# Patient Record
Sex: FEMALE | Race: WHITE | NOT HISPANIC OR LATINO
[De-identification: names, ages, dates, MRNs, and addresses within clinical notes are randomized per-mention and may not be internally consistent; named-entity substitution may affect disease eponyms.]

---

## 2017-01-17 ENCOUNTER — APPOINTMENT (OUTPATIENT)
Dept: RADIOLOGY | Facility: HOSPITAL | Age: 72
End: 2017-01-17

## 2018-05-29 ENCOUNTER — INPATIENT (INPATIENT)
Facility: HOSPITAL | Age: 73
LOS: 1 days | Discharge: HOME | DRG: 641 | End: 2018-05-31
Attending: INTERNAL MEDICINE | Admitting: INTERNAL MEDICINE
Payer: MEDICARE

## 2018-05-29 DIAGNOSIS — R55 SYNCOPE AND COLLAPSE: ICD-10-CM

## 2018-05-29 PROCEDURE — 71045 X-RAY EXAM CHEST 1 VIEW: CPT | Mod: 26

## 2018-05-29 PROCEDURE — 70450 CT HEAD/BRAIN W/O DYE: CPT | Mod: 26

## 2018-05-29 PROCEDURE — 99223 1ST HOSP IP/OBS HIGH 75: CPT

## 2018-05-29 PROCEDURE — 93010 ELECTROCARDIOGRAM REPORT: CPT

## 2018-05-30 PROCEDURE — 71275 CT ANGIOGRAPHY CHEST: CPT | Mod: 26

## 2018-05-30 PROCEDURE — 93970 EXTREMITY STUDY: CPT | Mod: 26

## 2018-05-30 PROCEDURE — 93306 TTE W/DOPPLER COMPLETE: CPT | Mod: 26

## 2018-05-30 PROCEDURE — 99233 SBSQ HOSP IP/OBS HIGH 50: CPT

## 2018-05-31 PROCEDURE — 76705 ECHO EXAM OF ABDOMEN: CPT | Mod: 26,RT

## 2018-05-31 PROCEDURE — 80048 BASIC METABOLIC PNL TOTAL CA: CPT

## 2018-05-31 PROCEDURE — 93306 TTE W/DOPPLER COMPLETE: CPT

## 2018-05-31 PROCEDURE — 84484 ASSAY OF TROPONIN QUANT: CPT

## 2018-05-31 PROCEDURE — 82248 BILIRUBIN DIRECT: CPT

## 2018-05-31 PROCEDURE — 83735 ASSAY OF MAGNESIUM: CPT

## 2018-05-31 PROCEDURE — 83036 HEMOGLOBIN GLYCOSYLATED A1C: CPT

## 2018-05-31 PROCEDURE — 82553 CREATINE MB FRACTION: CPT

## 2018-05-31 PROCEDURE — 85610 PROTHROMBIN TIME: CPT

## 2018-05-31 PROCEDURE — 99232 SBSQ HOSP IP/OBS MODERATE 35: CPT

## 2018-05-31 PROCEDURE — 99285 EMERGENCY DEPT VISIT HI MDM: CPT | Mod: 25

## 2018-05-31 PROCEDURE — 85027 COMPLETE CBC AUTOMATED: CPT

## 2018-05-31 PROCEDURE — 85730 THROMBOPLASTIN TIME PARTIAL: CPT

## 2018-05-31 PROCEDURE — 71275 CT ANGIOGRAPHY CHEST: CPT

## 2018-05-31 PROCEDURE — 70450 CT HEAD/BRAIN W/O DYE: CPT

## 2018-05-31 PROCEDURE — 83880 ASSAY OF NATRIURETIC PEPTIDE: CPT

## 2018-05-31 PROCEDURE — 97162 PT EVAL MOD COMPLEX 30 MIN: CPT

## 2018-05-31 PROCEDURE — 80053 COMPREHEN METABOLIC PANEL: CPT

## 2018-05-31 PROCEDURE — 83921 ORGANIC ACID SINGLE QUANT: CPT

## 2018-05-31 PROCEDURE — 99239 HOSP IP/OBS DSCHRG MGMT >30: CPT

## 2018-05-31 PROCEDURE — 82550 ASSAY OF CK (CPK): CPT

## 2018-05-31 PROCEDURE — 80076 HEPATIC FUNCTION PANEL: CPT

## 2018-05-31 PROCEDURE — 93970 EXTREMITY STUDY: CPT

## 2018-05-31 PROCEDURE — 36415 COLL VENOUS BLD VENIPUNCTURE: CPT

## 2018-05-31 PROCEDURE — 71045 X-RAY EXAM CHEST 1 VIEW: CPT

## 2018-05-31 PROCEDURE — 82607 VITAMIN B-12: CPT

## 2018-05-31 PROCEDURE — 94640 AIRWAY INHALATION TREATMENT: CPT

## 2018-05-31 PROCEDURE — 93005 ELECTROCARDIOGRAM TRACING: CPT

## 2018-05-31 PROCEDURE — 76705 ECHO EXAM OF ABDOMEN: CPT

## 2018-07-25 ENCOUNTER — EMERGENCY (EMERGENCY)
Facility: HOSPITAL | Age: 73
LOS: 1 days | Discharge: HOME | End: 2018-07-25
Attending: EMERGENCY MEDICINE | Admitting: EMERGENCY MEDICINE
Payer: MEDICARE

## 2018-07-25 VITALS
DIASTOLIC BLOOD PRESSURE: 72 MMHG | RESPIRATION RATE: 18 BRPM | HEART RATE: 90 BPM | HEIGHT: 64 IN | WEIGHT: 149.91 LBS | OXYGEN SATURATION: 95 % | SYSTOLIC BLOOD PRESSURE: 127 MMHG | TEMPERATURE: 98 F

## 2018-07-25 DIAGNOSIS — R07.81 PLEURODYNIA: ICD-10-CM

## 2018-07-25 PROCEDURE — 99284 EMERGENCY DEPT VISIT MOD MDM: CPT

## 2018-07-26 VITALS
DIASTOLIC BLOOD PRESSURE: 93 MMHG | RESPIRATION RATE: 16 BRPM | HEART RATE: 95 BPM | TEMPERATURE: 99 F | OXYGEN SATURATION: 95 % | SYSTOLIC BLOOD PRESSURE: 181 MMHG

## 2018-07-26 PROCEDURE — 71250 CT THORAX DX C-: CPT | Mod: 26

## 2018-07-26 PROCEDURE — 99284 EMERGENCY DEPT VISIT MOD MDM: CPT

## 2018-07-26 PROCEDURE — 70450 CT HEAD/BRAIN W/O DYE: CPT

## 2018-07-26 PROCEDURE — 71250 CT THORAX DX C-: CPT

## 2018-07-26 PROCEDURE — 70450 CT HEAD/BRAIN W/O DYE: CPT | Mod: 26

## 2018-07-26 RX ORDER — IBUPROFEN 200 MG
1 TABLET ORAL
Qty: 20 | Refills: 0 | OUTPATIENT
Start: 2018-07-26 | End: 2018-07-30

## 2018-07-26 RX ORDER — IBUPROFEN 200 MG
1 TABLET ORAL
Qty: 8 | Refills: 0 | OUTPATIENT
Start: 2018-07-26 | End: 2018-07-27

## 2018-07-26 NOTE — ED PROVIDER NOTE - OBJECTIVE STATEMENT
Pertinent PMH/PSH/FHx/SHx and Review of Systems contained within:  74 y/o F with H/o asthma, DM and HTN , and alcohol abuse BIB ems c/o left upper rib pain s/p fall after drinking. C/o mod dull constant pain fro past few hours.

## 2018-07-26 NOTE — ED ADULT NURSE REASSESSMENT NOTE - NS ED NURSE REASSESS COMMENT FT1
Pt is sleeping comfortably. No resp. distress noted at this time. Pending sobriety
 Mr Ramírez Rodríguez called [phone], Mr Rodríguez will have the door open for the patient and the patient will be transferred home via Taxi.
Breakfast tray provided. Paper scrubs provided. VSS, RR=unlab. Patient awaiting Taxi transportation home.
Pt is A&Ox3 and is ambulating with a steady gait. No resp. distress noted at this time. Pt states that she is feeling better. Pt is waiting for  to wake up so he will be able to open the door for her. Several attempts made to contact  at , no answer. Message was left.

## 2018-07-27 ENCOUNTER — APPOINTMENT (OUTPATIENT)
Dept: CT IMAGING | Facility: HOSPITAL | Age: 73
End: 2018-07-27
Payer: MEDICARE

## 2018-07-27 ENCOUNTER — OUTPATIENT (OUTPATIENT)
Dept: OUTPATIENT SERVICES | Facility: HOSPITAL | Age: 73
LOS: 1 days | End: 2018-07-27
Payer: MEDICARE

## 2018-07-27 DIAGNOSIS — Z00.8 ENCOUNTER FOR OTHER GENERAL EXAMINATION: ICD-10-CM

## 2018-07-27 PROBLEM — I10 ESSENTIAL (PRIMARY) HYPERTENSION: Chronic | Status: ACTIVE | Noted: 2018-07-25

## 2018-07-27 PROBLEM — E11.9 TYPE 2 DIABETES MELLITUS WITHOUT COMPLICATIONS: Chronic | Status: ACTIVE | Noted: 2018-07-25

## 2018-07-27 PROBLEM — J45.909 UNSPECIFIED ASTHMA, UNCOMPLICATED: Chronic | Status: ACTIVE | Noted: 2018-07-25

## 2018-07-27 PROCEDURE — 71250 CT THORAX DX C-: CPT | Mod: 26

## 2018-07-27 PROCEDURE — 71250 CT THORAX DX C-: CPT

## 2018-09-29 ENCOUNTER — INPATIENT (INPATIENT)
Facility: HOSPITAL | Age: 73
LOS: 8 days | Discharge: NURSING, REHAB OR LONG-TERM CARE | DRG: 483 | End: 2018-10-08
Attending: STUDENT IN AN ORGANIZED HEALTH CARE EDUCATION/TRAINING PROGRAM | Admitting: HOSPITALIST
Payer: MEDICARE

## 2018-09-29 VITALS
DIASTOLIC BLOOD PRESSURE: 89 MMHG | WEIGHT: 149.91 LBS | HEIGHT: 64 IN | SYSTOLIC BLOOD PRESSURE: 170 MMHG | HEART RATE: 95 BPM | OXYGEN SATURATION: 95 % | RESPIRATION RATE: 18 BRPM

## 2018-09-29 DIAGNOSIS — E11.9 TYPE 2 DIABETES MELLITUS WITHOUT COMPLICATIONS: ICD-10-CM

## 2018-09-29 DIAGNOSIS — S42.301A UNSPECIFIED FRACTURE OF SHAFT OF HUMERUS, RIGHT ARM, INITIAL ENCOUNTER FOR CLOSED FRACTURE: ICD-10-CM

## 2018-09-29 DIAGNOSIS — Z95.1 PRESENCE OF AORTOCORONARY BYPASS GRAFT: Chronic | ICD-10-CM

## 2018-09-29 DIAGNOSIS — I10 ESSENTIAL (PRIMARY) HYPERTENSION: ICD-10-CM

## 2018-09-29 DIAGNOSIS — R55 SYNCOPE AND COLLAPSE: ICD-10-CM

## 2018-09-29 DIAGNOSIS — F10.10 ALCOHOL ABUSE, UNCOMPLICATED: ICD-10-CM

## 2018-09-29 LAB
ALBUMIN SERPL ELPH-MCNC: 3.2 G/DL — LOW (ref 3.3–5)
ALP SERPL-CCNC: 67 U/L — SIGNIFICANT CHANGE UP (ref 40–120)
ALT FLD-CCNC: 24 U/L DA — SIGNIFICANT CHANGE UP (ref 10–45)
ANION GAP SERPL CALC-SCNC: 13 MMOL/L — SIGNIFICANT CHANGE UP (ref 5–17)
APTT BLD: 33.5 SEC — SIGNIFICANT CHANGE UP (ref 27.5–37.4)
AST SERPL-CCNC: 21 U/L — SIGNIFICANT CHANGE UP (ref 10–40)
BASOPHILS # BLD AUTO: 0.1 K/UL — SIGNIFICANT CHANGE UP (ref 0–0.2)
BASOPHILS NFR BLD AUTO: 0.9 % — SIGNIFICANT CHANGE UP (ref 0–2)
BILIRUB SERPL-MCNC: 0.2 MG/DL — SIGNIFICANT CHANGE UP (ref 0.2–1.2)
BUN SERPL-MCNC: 14 MG/DL — SIGNIFICANT CHANGE UP (ref 7–23)
CALCIUM SERPL-MCNC: 8.7 MG/DL — SIGNIFICANT CHANGE UP (ref 8.4–10.5)
CHLORIDE SERPL-SCNC: 101 MMOL/L — SIGNIFICANT CHANGE UP (ref 96–108)
CK SERPL-CCNC: 125 U/L — SIGNIFICANT CHANGE UP (ref 25–170)
CO2 SERPL-SCNC: 24 MMOL/L — SIGNIFICANT CHANGE UP (ref 22–31)
CREAT SERPL-MCNC: 0.78 MG/DL — SIGNIFICANT CHANGE UP (ref 0.5–1.3)
EOSINOPHIL # BLD AUTO: 0.2 K/UL — SIGNIFICANT CHANGE UP (ref 0–0.5)
EOSINOPHIL NFR BLD AUTO: 1.6 % — SIGNIFICANT CHANGE UP (ref 0–6)
ETHANOL SERPL-MCNC: 186 MG/DL — HIGH (ref 0–3)
GLUCOSE SERPL-MCNC: 131 MG/DL — HIGH (ref 70–99)
HCT VFR BLD CALC: 42.5 % — SIGNIFICANT CHANGE UP (ref 34.5–45)
HGB BLD-MCNC: 14.3 G/DL — SIGNIFICANT CHANGE UP (ref 11.5–15.5)
INR BLD: 0.98 RATIO — SIGNIFICANT CHANGE UP (ref 0.88–1.16)
LYMPHOCYTES # BLD AUTO: 1.5 K/UL — SIGNIFICANT CHANGE UP (ref 1–3.3)
LYMPHOCYTES # BLD AUTO: 15 % — SIGNIFICANT CHANGE UP (ref 13–44)
MCHC RBC-ENTMCNC: 33.6 GM/DL — SIGNIFICANT CHANGE UP (ref 32–36)
MCHC RBC-ENTMCNC: 34.4 PG — HIGH (ref 27–34)
MCV RBC AUTO: 102.5 FL — HIGH (ref 80–100)
MONOCYTES # BLD AUTO: 0.5 K/UL — SIGNIFICANT CHANGE UP (ref 0–0.9)
MONOCYTES NFR BLD AUTO: 5.4 % — SIGNIFICANT CHANGE UP (ref 2–14)
NEUTROPHILS # BLD AUTO: 7.9 K/UL — HIGH (ref 1.8–7.4)
NEUTROPHILS NFR BLD AUTO: 77.2 % — HIGH (ref 43–77)
PLATELET # BLD AUTO: 175 K/UL — SIGNIFICANT CHANGE UP (ref 150–400)
POTASSIUM SERPL-MCNC: 4.4 MMOL/L — SIGNIFICANT CHANGE UP (ref 3.5–5.3)
POTASSIUM SERPL-SCNC: 4.4 MMOL/L — SIGNIFICANT CHANGE UP (ref 3.5–5.3)
PROT SERPL-MCNC: 7 G/DL — SIGNIFICANT CHANGE UP (ref 6–8.3)
PROTHROM AB SERPL-ACNC: 10.9 SEC — SIGNIFICANT CHANGE UP (ref 9.8–12.7)
RBC # BLD: 4.14 M/UL — SIGNIFICANT CHANGE UP (ref 3.8–5.2)
RBC # FLD: 12.6 % — SIGNIFICANT CHANGE UP (ref 10.3–14.5)
SODIUM SERPL-SCNC: 138 MMOL/L — SIGNIFICANT CHANGE UP (ref 135–145)
TROPONIN I SERPL-MCNC: <.017 NG/ML — LOW (ref 0.02–0.06)
WBC # BLD: 10.2 K/UL — SIGNIFICANT CHANGE UP (ref 3.8–10.5)
WBC # FLD AUTO: 10.2 K/UL — SIGNIFICANT CHANGE UP (ref 3.8–10.5)

## 2018-09-29 PROCEDURE — 71045 X-RAY EXAM CHEST 1 VIEW: CPT | Mod: 26

## 2018-09-29 PROCEDURE — 99223 1ST HOSP IP/OBS HIGH 75: CPT | Mod: 57

## 2018-09-29 PROCEDURE — 70450 CT HEAD/BRAIN W/O DYE: CPT | Mod: 26

## 2018-09-29 PROCEDURE — 93010 ELECTROCARDIOGRAM REPORT: CPT

## 2018-09-29 PROCEDURE — 73030 X-RAY EXAM OF SHOULDER: CPT | Mod: 26,RT

## 2018-09-29 PROCEDURE — 36620 INSERTION CATHETER ARTERY: CPT

## 2018-09-29 PROCEDURE — 76376 3D RENDER W/INTRP POSTPROCES: CPT | Mod: 26

## 2018-09-29 PROCEDURE — 12041 INTMD RPR N-HF/GENIT 2.5CM/<: CPT | Mod: 59

## 2018-09-29 PROCEDURE — 73060 X-RAY EXAM OF HUMERUS: CPT | Mod: 26,RT

## 2018-09-29 PROCEDURE — 99223 1ST HOSP IP/OBS HIGH 75: CPT | Mod: AI

## 2018-09-29 PROCEDURE — 73120 X-RAY EXAM OF HAND: CPT | Mod: 26,LT

## 2018-09-29 PROCEDURE — 99285 EMERGENCY DEPT VISIT HI MDM: CPT | Mod: 25

## 2018-09-29 PROCEDURE — 73200 CT UPPER EXTREMITY W/O DYE: CPT | Mod: 26,RT

## 2018-09-29 RX ORDER — SODIUM CHLORIDE 9 MG/ML
1000 INJECTION, SOLUTION INTRAVENOUS
Qty: 0 | Refills: 0 | Status: DISCONTINUED | OUTPATIENT
Start: 2018-09-29 | End: 2018-10-01

## 2018-09-29 RX ORDER — TETANUS TOXOID, REDUCED DIPHTHERIA TOXOID AND ACELLULAR PERTUSSIS VACCINE, ADSORBED 5; 2.5; 8; 8; 2.5 [IU]/.5ML; [IU]/.5ML; UG/.5ML; UG/.5ML; UG/.5ML
0.5 SUSPENSION INTRAMUSCULAR ONCE
Qty: 0 | Refills: 0 | Status: COMPLETED | OUTPATIENT
Start: 2018-09-29 | End: 2018-09-29

## 2018-09-29 RX ORDER — SODIUM CHLORIDE 9 MG/ML
1000 INJECTION INTRAMUSCULAR; INTRAVENOUS; SUBCUTANEOUS ONCE
Qty: 0 | Refills: 0 | Status: COMPLETED | OUTPATIENT
Start: 2018-09-29 | End: 2018-09-29

## 2018-09-29 RX ORDER — INSULIN LISPRO 100/ML
VIAL (ML) SUBCUTANEOUS
Qty: 0 | Refills: 0 | Status: DISCONTINUED | OUTPATIENT
Start: 2018-09-29 | End: 2018-10-01

## 2018-09-29 RX ORDER — OXYCODONE AND ACETAMINOPHEN 5; 325 MG/1; MG/1
1 TABLET ORAL EVERY 4 HOURS
Qty: 0 | Refills: 0 | Status: DISCONTINUED | OUTPATIENT
Start: 2018-09-29 | End: 2018-10-01

## 2018-09-29 RX ORDER — DEXTROSE 50 % IN WATER 50 %
25 SYRINGE (ML) INTRAVENOUS ONCE
Qty: 0 | Refills: 0 | Status: DISCONTINUED | OUTPATIENT
Start: 2018-09-29 | End: 2018-10-01

## 2018-09-29 RX ORDER — MORPHINE SULFATE 50 MG/1
4 CAPSULE, EXTENDED RELEASE ORAL ONCE
Qty: 0 | Refills: 0 | Status: DISCONTINUED | OUTPATIENT
Start: 2018-09-29 | End: 2018-09-29

## 2018-09-29 RX ORDER — HEPARIN SODIUM 5000 [USP'U]/ML
5000 INJECTION INTRAVENOUS; SUBCUTANEOUS EVERY 12 HOURS
Qty: 0 | Refills: 0 | Status: DISCONTINUED | OUTPATIENT
Start: 2018-09-29 | End: 2018-10-01

## 2018-09-29 RX ORDER — DEXTROSE 50 % IN WATER 50 %
12.5 SYRINGE (ML) INTRAVENOUS ONCE
Qty: 0 | Refills: 0 | Status: DISCONTINUED | OUTPATIENT
Start: 2018-09-29 | End: 2018-10-01

## 2018-09-29 RX ORDER — DIPHENHYDRAMINE HCL 50 MG
25 CAPSULE ORAL ONCE
Qty: 0 | Refills: 0 | Status: COMPLETED | OUTPATIENT
Start: 2018-09-29 | End: 2018-09-29

## 2018-09-29 RX ORDER — THIAMINE MONONITRATE (VIT B1) 100 MG
100 TABLET ORAL DAILY
Qty: 0 | Refills: 0 | Status: DISCONTINUED | OUTPATIENT
Start: 2018-09-29 | End: 2018-10-01

## 2018-09-29 RX ORDER — GLUCAGON INJECTION, SOLUTION 0.5 MG/.1ML
1 INJECTION, SOLUTION SUBCUTANEOUS ONCE
Qty: 0 | Refills: 0 | Status: DISCONTINUED | OUTPATIENT
Start: 2018-09-29 | End: 2018-10-01

## 2018-09-29 RX ORDER — FOLIC ACID 0.8 MG
1 TABLET ORAL DAILY
Qty: 0 | Refills: 0 | Status: DISCONTINUED | OUTPATIENT
Start: 2018-09-29 | End: 2018-10-01

## 2018-09-29 RX ORDER — DEXTROSE 50 % IN WATER 50 %
15 SYRINGE (ML) INTRAVENOUS ONCE
Qty: 0 | Refills: 0 | Status: DISCONTINUED | OUTPATIENT
Start: 2018-09-29 | End: 2018-10-01

## 2018-09-29 RX ADMIN — Medication 100 MILLIGRAM(S): at 23:01

## 2018-09-29 RX ADMIN — OXYCODONE AND ACETAMINOPHEN 1 TABLET(S): 5; 325 TABLET ORAL at 23:20

## 2018-09-29 RX ADMIN — Medication 25 MILLIGRAM(S): at 16:29

## 2018-09-29 RX ADMIN — MORPHINE SULFATE 4 MILLIGRAM(S): 50 CAPSULE, EXTENDED RELEASE ORAL at 15:59

## 2018-09-29 RX ADMIN — SODIUM CHLORIDE 1000 MILLILITER(S): 9 INJECTION INTRAMUSCULAR; INTRAVENOUS; SUBCUTANEOUS at 16:26

## 2018-09-29 RX ADMIN — OXYCODONE AND ACETAMINOPHEN 1 TABLET(S): 5; 325 TABLET ORAL at 22:23

## 2018-09-29 RX ADMIN — TETANUS TOXOID, REDUCED DIPHTHERIA TOXOID AND ACELLULAR PERTUSSIS VACCINE, ADSORBED 0.5 MILLILITER(S): 5; 2.5; 8; 8; 2.5 SUSPENSION INTRAMUSCULAR at 16:00

## 2018-09-29 NOTE — ED PROVIDER NOTE - CONSULTING PHYSICIAN
SW spoke with case management Myles regarding  who lives at home in poor hygienic conditions Ortho Dr. Dicpingatis
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
## 2018-09-29 NOTE — H&P ADULT - NSHPREVIEWOFSYSTEMS_GEN_ALL_CORE
REVIEW OF SYSTEMS:  CONSTITUTIONAL: No fever, weight loss, or fatigue  EYES: No eye pain, visual disturbances, or discharge  ENMT:  No difficulty hearing, tinnitus, vertigo; No sinus or throat pain  NECK: No pain or stiffness  BREASTS: No pain, masses, or nipple discharge  RESPIRATORY: No cough, wheezing, chills or hemoptysis; No shortness of breath  CARDIOVASCULAR: No chest pain, palpitations, dizziness, or leg swelling  GASTROINTESTINAL: No abdominal or epigastric pain. No nausea, vomiting, or hematemesis; No diarrhea or constipation. No melena or hematochezia.  GENITOURINARY: No dysuria, frequency, hematuria, or incontinence  NEUROLOGICAL: No headaches, memory loss, loss of strength, numbness, or tremors  SKIN: No itching, burning, rashes, or lesions   LYMPH NODES: No enlarged glands  ENDOCRINE: No heat or cold intolerance; No hair loss  MUSCULOSKELETAL: No joint pain or swelling; No muscle, back, or extremity pain  PSYCHIATRIC: No depression, anxiety, mood swings, or difficulty sleeping  HEME/LYMPH: No easy bruising, or bleeding gums  ALLERY AND IMMUNOLOGIC: No hives or eczema    ALL ROS REVIEWED AND NORMAL EXCEPT AS STATED ABOVE

## 2018-09-29 NOTE — ED PROVIDER NOTE - CARE PLAN
Principal Discharge DX:	Syncope, unspecified syncope type  Secondary Diagnosis:	Closed fracture of shaft of right humerus, unspecified fracture morphology, initial encounter Principal Discharge DX:	Syncope, unspecified syncope type  Secondary Diagnosis:	Closed fracture of shaft of right humerus, unspecified fracture morphology, initial encounter  Secondary Diagnosis:	Injury of head, initial encounter

## 2018-09-29 NOTE — ED ADULT NURSE NOTE - OBJECTIVE STATEMENT
73 year old female presents with left shoulder pain s/p fall in the bathroom. Pt stated that she was getting up from the toilet when she fell. Admitted to drinking wine today. Skin tear noted to right forearm. 73 year old female presents with left shoulder pain s/p fall in the bathroom. Pt stated that she was getting up from the toilet when she fell. Admitted to drinking wine today. Pt drinks everyday, no history of withdrawal. Skin tear noted to right forearm, laceration to the back of the head and abrasion to the right 4th finger. 73 year old female presents with left shoulder pain s/p fall in the bathroom. Pt stated that she was getting up from the toilet when she fell. Admitted to drinking wine today. Pt drinks everyday, no history of withdrawal. Skin tear noted to right forearm, laceration to the right 4th finger.

## 2018-09-29 NOTE — ED PROVIDER NOTE - MUSCULOSKELETAL MINIMAL EXAM
no C spine ttp, +right shoulder diffuse ttp, +right mid shaft humerus ttp, no right snuffbox ttp, no right wrist or elbow ttp. L wrist no ttp, no L elbow or shoulder ttp

## 2018-09-29 NOTE — ED PROCEDURE NOTE - NS ED PROCEDURE ASSISTED BY
The procedure was performed independently
The procedure was performed independently
Assistance was available

## 2018-09-29 NOTE — H&P ADULT - NSHPPHYSICALEXAM_GEN_ALL_CORE
T(C): 37.1 (09-29-18 @ 16:45), Max: 37.1 (09-29-18 @ 16:45)  HR: 95 (09-29-18 @ 14:57) (95 - 95)  BP: 170/89 (09-29-18 @ 14:57) (170/89 - 170/89)  RR: 18 (09-29-18 @ 14:57) (18 - 18)  SpO2: 95% (09-29-18 @ 14:57) (95% - 95%)  Wt(kg): --Vital Signs Last 24 Hrs  T(C): 37.1 (29 Sep 2018 16:45), Max: 37.1 (29 Sep 2018 16:45)  T(F): 98.7 (29 Sep 2018 16:45), Max: 98.7 (29 Sep 2018 16:45)  HR: 95 (29 Sep 2018 14:57) (95 - 95)  BP: 170/89 (29 Sep 2018 14:57) (170/89 - 170/89)  BP(mean): --  RR: 18 (29 Sep 2018 14:57) (18 - 18)  SpO2: 95% (29 Sep 2018 14:57) (95% - 95%)    PHYSICAL EXAM:  GENERAL: NAD, well-groomed, well-developed  HEAD:  Atraumatic, Normocephalic  EYES: EOMI, PERRLA, conjunctiva and sclera clear  ENMT: No tonsillar erythema, exudates, or enlargement; Moist mucous membranes, Good dentition, No lesions  NECK: Supple, No JVD, Normal thyroid  NERVOUS SYSTEM:  Alert & Oriented X3, Good concentration; Motor Strength 5/5 B/L upper and lower extremities; DTRs 2+ intact and symmetric  CHEST/LUNG: Clear to percussion bilaterally; No rales, rhonchi, wheezing, or rubs  HEART: Regular rate and rhythm; No murmurs, rubs, or gallops  ABDOMEN: Soft, Nontender, Nondistended; Bowel sounds present  EXTREMITIES:  2+ Peripheral Pulses, right arm in sling, tenderness to palpation, left ring finger laceration with sutures  LYMPH: No lymphadenopathy noted  SKIN: No rashes or lesions

## 2018-09-29 NOTE — ED PROVIDER NOTE - ATTENDING CONTRIBUTION TO CARE
Dr. Vigil: I performed a face to face bedside interview with patient regarding history of present illness, review of symptoms and past medical history. I completed an independent physical exam.  I have discussed patient's plan of care with PA.   I agree with note as stated above, having amended the EMR as needed to reflect my findings.   This includes HISTORY OF PRESENT ILLNESS, HIV, PAST MEDICAL/SURGICAL/FAMILY/SOCIAL HISTORY, ALLERGIES AND HOME MEDICATIONS, REVIEW OF SYSTEMS, PHYSICAL EXAM, and any PROGRESS NOTES during the time I functioned as the attending physician for this patient.    Dr. Vigil: This H&P has been written by myself in its entirety

## 2018-09-29 NOTE — ED PROVIDER NOTE - OBJECTIVE STATEMENT
72y/o f p/w left hand pain and right shoulder pain s/p fall at home in bathroom, unknown mechanism. Pt unable to recall details of fall, EMS reported top of toilet broken.  Hx of alcohol use, pt poor historian. Dr. Vigil: 73M h/o etoh abuse, drinks daily, no h/o WD but has never stopped drinking, h/o DM, HTN, asthma, had 2 glasses of wine today and fell in the bathroom. Unsure what made her fall, does not remember tripping. Hit the back of her head on the toilet and it cracked. +LOC. Unknown down time. +pain to right humerus and bleeding from left 4th finger. BIBEMS.  Per EMS pt and  live together, house was in disarray, they have a tub at home in which they have been defecating.

## 2018-09-29 NOTE — ED PROVIDER NOTE - SECONDARY DIAGNOSIS.
Closed fracture of shaft of right humerus, unspecified fracture morphology, initial encounter Injury of head, initial encounter

## 2018-09-29 NOTE — H&P ADULT - NSHPLABSRESULTS_GEN_ALL_CORE
LABS:                        14.3   10.2  )-----------( 175      ( 29 Sep 2018 16:32 )             42.5     09-29    138  |  101  |  14  ----------------------------<  131<H>  4.4   |  24  |  0.78    Ca    8.7      29 Sep 2018 16:32    TPro  7.0  /  Alb  3.2<L>  /  TBili  0.2  /  DBili  x   /  AST  21  /  ALT  24  /  AlkPhos  67  09-29    PT/INR - ( 29 Sep 2018 16:32 )   PT: 10.9 sec;   INR: 0.98 ratio       PTT - ( 29 Sep 2018 16:32 )  PTT:33.5 sec     CAPILLARY BLOOD GLUCOSE    RADIOLOGY & ADDITIONAL TESTS:    Consultant(s) Notes Reviewed:  [x ] YES  [ ] NO  Care Discussed with Consultants/Other Providers [ x] YES  [ ] NO  Imaging Personally Reviewed:  [ ] YES  [ ] NO

## 2018-09-29 NOTE — ED PROVIDER NOTE - MEDICAL DECISION MAKING DETAILS
pain ctrl, tdap, xray right shoulder/right humerus and left hand. CT head. EKG, labs for unwitnessed fall and syncope.

## 2018-09-29 NOTE — ED PROCEDURE NOTE - CPROC ED POST PROC CARE GUIDE1
Verbal/written post procedure instructions were given to patient/caregiver.
Instructed patient/caregiver to follow-up with primary care physician./Instructed patient/caregiver regarding signs and symptoms of infection./Verbal/written post procedure instructions were given to patient/caregiver.
Instructed patient/caregiver regarding signs and symptoms of infection./Verbal/written post procedure instructions were given to patient/caregiver./Instructed patient/caregiver to follow-up with primary care physician.

## 2018-09-29 NOTE — ED PROCEDURE NOTE - CPROC ED ARTER LINE DETAIL1
Positive blood return was obtained via the catheter./Using sterile technique, the correct location was identified, and a needle was inserted into the artery (specify in FT).

## 2018-09-29 NOTE — ED ADULT NURSE NOTE - NSIMPLEMENTINTERV_GEN_ALL_ED
Implemented All Fall Risk Interventions:  [place] to call system. Call bell, personal items and telephone within reach. Instruct patient to call for assistance. Room bathroom lighting operational. Non-slip footwear when patient is off stretcher. Physically safe environment: no spills, clutter or unnecessary equipment. Stretcher in lowest position, wheels locked, appropriate side rails in place. Provide visual cue, wrist band, yellow gown, etc. Monitor gait and stability. Monitor for mental status changes and reorient to person, place, and time. Review medications for side effects contributing to fall risk. Reinforce activity limits and safety measures with patient and family.

## 2018-09-29 NOTE — H&P ADULT - ASSESSMENT
73W PMH alcoholism?, HTN, HLD, CAd s/p CABG presents for fall off toilet, now with right humeral neck fracture.

## 2018-09-30 ENCOUNTER — TRANSCRIPTION ENCOUNTER (OUTPATIENT)
Age: 73
End: 2018-09-30

## 2018-09-30 DIAGNOSIS — Z29.9 ENCOUNTER FOR PROPHYLACTIC MEASURES, UNSPECIFIED: ICD-10-CM

## 2018-09-30 DIAGNOSIS — D53.9 NUTRITIONAL ANEMIA, UNSPECIFIED: ICD-10-CM

## 2018-09-30 LAB
ANION GAP SERPL CALC-SCNC: 7 MMOL/L — SIGNIFICANT CHANGE UP (ref 5–17)
BASOPHILS # BLD AUTO: 0.1 K/UL — SIGNIFICANT CHANGE UP (ref 0–0.2)
BASOPHILS NFR BLD AUTO: 1.1 % — SIGNIFICANT CHANGE UP (ref 0–2)
BUN SERPL-MCNC: 16 MG/DL — SIGNIFICANT CHANGE UP (ref 7–23)
CALCIUM SERPL-MCNC: 8.3 MG/DL — LOW (ref 8.4–10.5)
CHLORIDE SERPL-SCNC: 103 MMOL/L — SIGNIFICANT CHANGE UP (ref 96–108)
CO2 SERPL-SCNC: 26 MMOL/L — SIGNIFICANT CHANGE UP (ref 22–31)
CREAT SERPL-MCNC: 0.78 MG/DL — SIGNIFICANT CHANGE UP (ref 0.5–1.3)
EOSINOPHIL # BLD AUTO: 0.1 K/UL — SIGNIFICANT CHANGE UP (ref 0–0.5)
EOSINOPHIL NFR BLD AUTO: 1.1 % — SIGNIFICANT CHANGE UP (ref 0–6)
GLUCOSE SERPL-MCNC: 99 MG/DL — SIGNIFICANT CHANGE UP (ref 70–99)
HBA1C BLD-MCNC: 5.7 % — HIGH (ref 4–5.6)
HCT VFR BLD CALC: 36.2 % — SIGNIFICANT CHANGE UP (ref 34.5–45)
HGB BLD-MCNC: 11.9 G/DL — SIGNIFICANT CHANGE UP (ref 11.5–15.5)
LYMPHOCYTES # BLD AUTO: 2.1 K/UL — SIGNIFICANT CHANGE UP (ref 1–3.3)
LYMPHOCYTES # BLD AUTO: 20.9 % — SIGNIFICANT CHANGE UP (ref 13–44)
MAGNESIUM SERPL-MCNC: 1.2 MG/DL — LOW (ref 1.6–2.6)
MCHC RBC-ENTMCNC: 32.7 GM/DL — SIGNIFICANT CHANGE UP (ref 32–36)
MCHC RBC-ENTMCNC: 33.9 PG — SIGNIFICANT CHANGE UP (ref 27–34)
MCV RBC AUTO: 103.5 FL — HIGH (ref 80–100)
MONOCYTES # BLD AUTO: 1 K/UL — HIGH (ref 0–0.9)
MONOCYTES NFR BLD AUTO: 9.8 % — SIGNIFICANT CHANGE UP (ref 2–14)
NEUTROPHILS # BLD AUTO: 6.7 K/UL — SIGNIFICANT CHANGE UP (ref 1.8–7.4)
NEUTROPHILS NFR BLD AUTO: 67.1 % — SIGNIFICANT CHANGE UP (ref 43–77)
PLATELET # BLD AUTO: 153 K/UL — SIGNIFICANT CHANGE UP (ref 150–400)
POTASSIUM SERPL-MCNC: 5 MMOL/L — SIGNIFICANT CHANGE UP (ref 3.5–5.3)
POTASSIUM SERPL-SCNC: 5 MMOL/L — SIGNIFICANT CHANGE UP (ref 3.5–5.3)
RBC # BLD: 3.5 M/UL — LOW (ref 3.8–5.2)
RBC # FLD: 12.9 % — SIGNIFICANT CHANGE UP (ref 10.3–14.5)
SODIUM SERPL-SCNC: 136 MMOL/L — SIGNIFICANT CHANGE UP (ref 135–145)
VIT B12 SERPL-MCNC: 178 PG/ML — LOW (ref 232–1245)
WBC # BLD: 10 K/UL — SIGNIFICANT CHANGE UP (ref 3.8–10.5)
WBC # FLD AUTO: 10 K/UL — SIGNIFICANT CHANGE UP (ref 3.8–10.5)

## 2018-09-30 PROCEDURE — 99233 SBSQ HOSP IP/OBS HIGH 50: CPT

## 2018-09-30 PROCEDURE — 93010 ELECTROCARDIOGRAM REPORT: CPT

## 2018-09-30 PROCEDURE — 93306 TTE W/DOPPLER COMPLETE: CPT | Mod: 26

## 2018-09-30 RX ORDER — MAGNESIUM SULFATE 500 MG/ML
2 VIAL (ML) INJECTION ONCE
Qty: 0 | Refills: 0 | Status: DISCONTINUED | OUTPATIENT
Start: 2018-09-30 | End: 2018-09-30

## 2018-09-30 RX ORDER — LOSARTAN POTASSIUM 100 MG/1
50 TABLET, FILM COATED ORAL DAILY
Qty: 0 | Refills: 0 | Status: DISCONTINUED | OUTPATIENT
Start: 2018-09-30 | End: 2018-10-01

## 2018-09-30 RX ORDER — MAGNESIUM SULFATE 500 MG/ML
1 VIAL (ML) INJECTION
Qty: 0 | Refills: 0 | Status: COMPLETED | OUTPATIENT
Start: 2018-09-30 | End: 2018-09-30

## 2018-09-30 RX ORDER — METOPROLOL TARTRATE 50 MG
100 TABLET ORAL DAILY
Qty: 0 | Refills: 0 | Status: DISCONTINUED | OUTPATIENT
Start: 2018-09-30 | End: 2018-10-01

## 2018-09-30 RX ADMIN — Medication 100 GRAM(S): at 11:00

## 2018-09-30 RX ADMIN — OXYCODONE AND ACETAMINOPHEN 1 TABLET(S): 5; 325 TABLET ORAL at 09:59

## 2018-09-30 RX ADMIN — OXYCODONE AND ACETAMINOPHEN 1 TABLET(S): 5; 325 TABLET ORAL at 05:43

## 2018-09-30 RX ADMIN — OXYCODONE AND ACETAMINOPHEN 1 TABLET(S): 5; 325 TABLET ORAL at 14:15

## 2018-09-30 RX ADMIN — Medication 100 GRAM(S): at 09:49

## 2018-09-30 RX ADMIN — OXYCODONE AND ACETAMINOPHEN 1 TABLET(S): 5; 325 TABLET ORAL at 18:30

## 2018-09-30 RX ADMIN — Medication 50 MILLIGRAM(S): at 14:15

## 2018-09-30 RX ADMIN — Medication 100 MILLIGRAM(S): at 05:44

## 2018-09-30 RX ADMIN — OXYCODONE AND ACETAMINOPHEN 1 TABLET(S): 5; 325 TABLET ORAL at 21:39

## 2018-09-30 RX ADMIN — OXYCODONE AND ACETAMINOPHEN 1 TABLET(S): 5; 325 TABLET ORAL at 18:00

## 2018-09-30 RX ADMIN — OXYCODONE AND ACETAMINOPHEN 1 TABLET(S): 5; 325 TABLET ORAL at 14:45

## 2018-09-30 RX ADMIN — LOSARTAN POTASSIUM 50 MILLIGRAM(S): 100 TABLET, FILM COATED ORAL at 05:43

## 2018-09-30 RX ADMIN — OXYCODONE AND ACETAMINOPHEN 1 TABLET(S): 5; 325 TABLET ORAL at 23:01

## 2018-09-30 RX ADMIN — Medication 1 TABLET(S): at 11:45

## 2018-09-30 RX ADMIN — Medication 50 MILLIGRAM(S): at 05:43

## 2018-09-30 RX ADMIN — Medication 100 MILLIGRAM(S): at 11:46

## 2018-09-30 RX ADMIN — Medication 1 MILLIGRAM(S): at 11:02
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
## 2018-09-30 NOTE — PROGRESS NOTE ADULT - SUBJECTIVE AND OBJECTIVE BOX
Patient is a 73y old  Female who presents with a chief complaint of fall off toilet (30 Sep 2018 05:13)      Patient seen and examined at bedside.    ALLERGIES:  dust (Unknown)  latex (Unknown)  Originally Entered as [Unknown] reaction to [CATHAIR] (Unknown)  Originally Entered as [Unknown] reaction to [MILDEW] (Unknown)  Zosyn (Unknown)    MEDICATIONS:  chlordiazePOXIDE 50 milliGRAM(s) Oral three times a day  dextrose 40% Gel 15 Gram(s) Oral once PRN  dextrose 5%. 1000 milliLiter(s) IV Continuous <Continuous>  dextrose 50% Injectable 12.5 Gram(s) IV Push once  dextrose 50% Injectable 25 Gram(s) IV Push once  dextrose 50% Injectable 25 Gram(s) IV Push once  folic acid 1 milliGRAM(s) Oral daily  glucagon  Injectable 1 milliGRAM(s) IntraMuscular once PRN  heparin  Injectable 5000 Unit(s) SubCutaneous every 12 hours  insulin lispro (HumaLOG) corrective regimen sliding scale   SubCutaneous three times a day before meals  LORazepam   Injectable 1 milliGRAM(s) IntraMuscular every 1 hour PRN  losartan 50 milliGRAM(s) Oral daily  magnesium sulfate  IVPB 1 Gram(s) IV Intermittent every 1 hour  metoprolol succinate  milliGRAM(s) Oral daily  multivitamin 1 Tablet(s) Oral daily  oxyCODONE    5 mG/acetaminophen 325 mG 1 Tablet(s) Oral every 4 hours PRN  thiamine 100 milliGRAM(s) Oral daily    Vital Signs Last 24 Hrs  T(F): 98 (30 Sep 2018 09:21), Max: 98.8 (29 Sep 2018 22:12)  HR: 63 (30 Sep 2018 09:21) (63 - 95)  BP: 121/65 (30 Sep 2018 09:21) (121/65 - 170/89)  RR: 15 (30 Sep 2018 09:21) (14 - 18)  SpO2: 96% (30 Sep 2018 09:21) (95% - 99%)  I&O's Summary      PHYSICAL EXAM:  General: NAD, A/O x 3  ENT: MMM  Neck: Supple, No JVD  Lungs: Clear to auscultation bilaterally  Cardio: RRR, S1/S2, No murmurs  Abdomen: Soft, Nontender, Nondistended; Bowel sounds present  Extremities: No cyanosis, No edema right arm in sling     LABS:                        11.9   10.0  )-----------( 153      ( 30 Sep 2018 06:00 )             36.2     09-30    136  |  103  |  16  ----------------------------<  99  5.0   |  26  |  0.78    Ca    8.3      30 Sep 2018 06:00  Mg     1.2     09-30    TPro  7.0  /  Alb  3.2  /  TBili  0.2  /  DBili  x   /  AST  21  /  ALT  24  /  AlkPhos  67  09-29    eGFR if Non African American: 75 mL/min/1.73M2 (09-30-18 @ 06:00)  eGFR if : 87 mL/min/1.73M2 (09-30-18 @ 06:00)    PT/INR - ( 29 Sep 2018 16:32 )   PT: 10.9 sec;   INR: 0.98 ratio         PTT - ( 29 Sep 2018 16:32 )  PTT:33.5 sec    CARDIAC MARKERS ( 29 Sep 2018 16:32 )  <.017 ng/mL / x     / 125 U/L / x     / x                  CAPILLARY BLOOD GLUCOSE      POCT Blood Glucose.: 113 mg/dL (30 Sep 2018 07:33)  POCT Blood Glucose.: 184 mg/dL (29 Sep 2018 22:01)            RADIOLOGY & ADDITIONAL TESTS:    Care Discussed with Consultants/Other Providers:

## 2018-09-30 NOTE — PROGRESS NOTE ADULT - PROBLEM SELECTOR PLAN 1
patient will require surgical repair of humeral fracture she will also require cardio clearance for surgery. Raissa MOODY and Dr Coronel patient will require surgical repair of humeral fracture she will also require cardio clearance for surgery. Raissa MOODY and Dr Coronel aware  RCI patient is moderate risk 6.6% patient is medically optimized pending cardio clearance

## 2018-09-30 NOTE — PROGRESS NOTE ADULT - SUBJECTIVE AND OBJECTIVE BOX
Patient evaluated, chart reviewed, labs checked.  Pt with known alcoholism, CAD, DM, CABG, presents with a right are fracture after a fall at home.  Librium protocol started.   Would postpone this case until Tomorrow to monitor alcohol withdrawl, Greater regional anesthesia access and greater ICU coverage access.  Chago Dougherty

## 2018-09-30 NOTE — PROGRESS NOTE ADULT - ASSESSMENT
72 yo f pmh cad s/p cabg, essential htn (off meds), dyslipidemia, etoh abuse pw fall with right humeral fracture

## 2018-09-30 NOTE — PROGRESS NOTE ADULT - PROBLEM SELECTOR PLAN 2
alcohol level high  will start librium taper tomorrow  ativan PRN  patient requires surgical repair of humeral fracture she should be monitored in ICU s/p procedure may require precedex or intubation for airway protection s/p  surgery. Monitor in ICU sp surgery Dr Sagastume aware alcohol level high on admission current CIWA 2  on librium protocol   ativan PRN  patient requires surgical repair of humeral fracture she should be monitored in ICU s/p procedure may require precedex or intubation for airway protection s/p  surgery. Monitor in ICU sp surgery Dr Sagastume aware alcohol level high on admission current CIWA 2  on librium protocol   ativan PRN  patient requires surgical repair of humeral fracture she should be monitored in ICU s/p procedure may require precedex or intubation for airway protection s/p  surgery. Monitor in ICU sp surgery Dr Sagastume aware  cw thiamine folic acid and mtv
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
## 2018-09-30 NOTE — PROGRESS NOTE ADULT - PROBLEM SELECTOR PLAN 4
hold antihypertensive as pharmacist states she hasn't picked up meds in a long time  bp stable cw monitoring

## 2018-09-30 NOTE — PROGRESS NOTE ADULT - SUBJECTIVE AND OBJECTIVE BOX
Patient is a 73y old  Female who presents with a chief complaint of fall off toilet (29 Sep 2018 18:05)      BRIEF HOSPITAL COURSE:     73F admitted after "fall" attempting to get off toilet. Later discovered she was intoxicated with a high EtOH level. Admitted for fall and EtOH withdrawl    Events last 24 hours:   -called by patient's RN as patient is on anti-hypertensives at home and they were not ordered by admitting team. Her Bp elevated this am.    PAST MEDICAL & SURGICAL HISTORY:  Alcohol abuse  HTN (hypertension)  Asthma  DM (diabetes mellitus)  S/P CABG x 4      Review of Systems:  CONSTITUTIONAL: No fever, chills, or fatigue  EYES: No eye pain, visual disturbances, or discharge  ENMT:  No difficulty hearing, tinnitus, vertigo; No sinus or throat pain  NECK: No pain or stiffness  RESPIRATORY: No cough, wheezing, chills or hemoptysis; No shortness of breath  CARDIOVASCULAR: No chest pain, palpitations, dizziness, or leg swelling  GASTROINTESTINAL: No abdominal or epigastric pain. No nausea, vomiting, or hematemesis; No diarrhea or constipation. No melena or hematochezia.  GENITOURINARY: No dysuria, frequency, hematuria, or incontinence  NEUROLOGICAL: No headaches, memory loss, loss of strength, numbness, or tremors  SKIN: No itching, burning, rashes, or lesions   MUSCULOSKELETAL: No joint pain or swelling; No muscle, back, or extremity pain  PSYCHIATRIC: No depression, anxiety, mood swings, or difficulty sleeping      Medications:    losartan 50 milliGRAM(s) Oral daily      chlordiazePOXIDE 50 milliGRAM(s) Oral three times a day  LORazepam   Injectable 1 milliGRAM(s) IntraMuscular every 1 hour PRN  oxyCODONE    5 mG/acetaminophen 325 mG 1 Tablet(s) Oral every 4 hours PRN      heparin  Injectable 5000 Unit(s) SubCutaneous every 12 hours        dextrose 40% Gel 15 Gram(s) Oral once PRN  dextrose 50% Injectable 12.5 Gram(s) IV Push once  dextrose 50% Injectable 25 Gram(s) IV Push once  dextrose 50% Injectable 25 Gram(s) IV Push once  glucagon  Injectable 1 milliGRAM(s) IntraMuscular once PRN  insulin lispro (HumaLOG) corrective regimen sliding scale   SubCutaneous three times a day before meals    dextrose 5%. 1000 milliLiter(s) IV Continuous <Continuous>  folic acid 1 milliGRAM(s) Oral daily  multivitamin 1 Tablet(s) Oral daily  thiamine 100 milliGRAM(s) Oral daily                ICU Vital Signs Last 24 Hrs  T(C): 37.1 (30 Sep 2018 05:04), Max: 37.1 (29 Sep 2018 16:45)  T(F): 98.8 (30 Sep 2018 05:04), Max: 98.8 (29 Sep 2018 22:12)  HR: 78 (30 Sep 2018 05:04) (72 - 95)  BP: 169/70 (30 Sep 2018 05:04) (133/75 - 170/89)  BP(mean): --  ABP: --  ABP(mean): --  RR: 15 (30 Sep 2018 05:04) (14 - 18)  SpO2: 99% (30 Sep 2018 05:04) (95% - 99%)          I&O's Detail        LABS:                        14.3   10.2  )-----------( 175      ( 29 Sep 2018 16:32 )             42.5     09-29    138  |  101  |  14  ----------------------------<  131<H>  4.4   |  24  |  0.78    Ca    8.7      29 Sep 2018 16:32    TPro  7.0  /  Alb  3.2<L>  /  TBili  0.2  /  DBili  x   /  AST  21  /  ALT  24  /  AlkPhos  67  09-29      CARDIAC MARKERS ( 29 Sep 2018 16:32 )  <.017 ng/mL / x     / 125 U/L / x     / x          CAPILLARY BLOOD GLUCOSE      POCT Blood Glucose.: 184 mg/dL (29 Sep 2018 22:01)    PT/INR - ( 29 Sep 2018 16:32 )   PT: 10.9 sec;   INR: 0.98 ratio         PTT - ( 29 Sep 2018 16:32 )  PTT:33.5 sec    CULTURES:      Physical Examination:    General: No acute distress.  Alert, oriented, interactive, nonfocal    HEENT: Pupils equal, reactive to light.  Symmetric.    PULM: Clear to auscultation bilaterally, no significant sputum production    CVS: Regular rate and rhythm, no murmurs, rubs, or gallops    ABD: Soft, nondistended, nontender, normoactive bowel sounds, no masses    EXT: No edema, nontender    SKIN: Warm and well perfused, no rashes noted.

## 2018-09-30 NOTE — PROGRESS NOTE ADULT - ASSESSMENT
73D w/ HTN, admitted with EtoH abuse/withdrawl, hypertensive this AM, no meds ordered by primary team

## 2018-09-30 NOTE — CONSULT NOTE ADULT - ATTENDING COMMENTS
preop cardiovascular risk assessment  would weight risk vs benefit of surgery in the setting of alcohol intoxication. clearly acute alcohol intoxication carries cardiopulmonary risk in the perioperative period . will require monitoring for signs and symptoms of alcohol withdrawal and airway management. that not withstanding, there are no strict contraindication to surgery such as recent infarction overt heart failure or unstable angina, and therefore this patient may undergo planned urgent orthopedic surgery at an ASA class III risk for general anesthesia with an increased risk of perioperative mortality and morbidity.

## 2018-10-01 ENCOUNTER — RESULT REVIEW (OUTPATIENT)
Age: 73
End: 2018-10-01

## 2018-10-01 LAB
ANION GAP SERPL CALC-SCNC: 7 MMOL/L — SIGNIFICANT CHANGE UP (ref 5–17)
BLD GP AB SCN SERPL QL: SIGNIFICANT CHANGE UP
BUN SERPL-MCNC: 23 MG/DL — SIGNIFICANT CHANGE UP (ref 7–23)
CALCIUM SERPL-MCNC: 8.6 MG/DL — SIGNIFICANT CHANGE UP (ref 8.4–10.5)
CHLORIDE SERPL-SCNC: 100 MMOL/L — SIGNIFICANT CHANGE UP (ref 96–108)
CO2 SERPL-SCNC: 28 MMOL/L — SIGNIFICANT CHANGE UP (ref 22–31)
CREAT SERPL-MCNC: 0.97 MG/DL — SIGNIFICANT CHANGE UP (ref 0.5–1.3)
GLUCOSE SERPL-MCNC: 151 MG/DL — HIGH (ref 70–99)
HCT VFR BLD CALC: 35 % — SIGNIFICANT CHANGE UP (ref 34.5–45)
HGB BLD-MCNC: 11.3 G/DL — LOW (ref 11.5–15.5)
MCHC RBC-ENTMCNC: 32.2 GM/DL — SIGNIFICANT CHANGE UP (ref 32–36)
MCHC RBC-ENTMCNC: 33.6 PG — SIGNIFICANT CHANGE UP (ref 27–34)
MCV RBC AUTO: 104.1 FL — HIGH (ref 80–100)
PLATELET # BLD AUTO: 147 K/UL — LOW (ref 150–400)
POTASSIUM SERPL-MCNC: 4.2 MMOL/L — SIGNIFICANT CHANGE UP (ref 3.5–5.3)
POTASSIUM SERPL-SCNC: 4.2 MMOL/L — SIGNIFICANT CHANGE UP (ref 3.5–5.3)
RBC # BLD: 3.37 M/UL — LOW (ref 3.8–5.2)
RBC # FLD: 13.1 % — SIGNIFICANT CHANGE UP (ref 10.3–14.5)
SODIUM SERPL-SCNC: 135 MMOL/L — SIGNIFICANT CHANGE UP (ref 135–145)
WBC # BLD: 11.1 K/UL — HIGH (ref 3.8–10.5)
WBC # FLD AUTO: 11.1 K/UL — HIGH (ref 3.8–10.5)

## 2018-10-01 PROCEDURE — 99232 SBSQ HOSP IP/OBS MODERATE 35: CPT

## 2018-10-01 PROCEDURE — 23616 OPTX PRX HMRL FX FIX RPR RPL: CPT | Mod: AS,RT

## 2018-10-01 PROCEDURE — 23616 OPTX PRX HMRL FX FIX RPR RPL: CPT | Mod: RT

## 2018-10-01 PROCEDURE — 88305 TISSUE EXAM BY PATHOLOGIST: CPT | Mod: 26

## 2018-10-01 PROCEDURE — 88311 DECALCIFY TISSUE: CPT | Mod: 26

## 2018-10-01 PROCEDURE — 99233 SBSQ HOSP IP/OBS HIGH 50: CPT

## 2018-10-01 PROCEDURE — 73030 X-RAY EXAM OF SHOULDER: CPT | Mod: 26,RT

## 2018-10-01 RX ORDER — ONDANSETRON 8 MG/1
4 TABLET, FILM COATED ORAL EVERY 6 HOURS
Qty: 0 | Refills: 0 | Status: DISCONTINUED | OUTPATIENT
Start: 2018-10-01 | End: 2018-10-08

## 2018-10-01 RX ORDER — ATORVASTATIN CALCIUM 80 MG/1
20 TABLET, FILM COATED ORAL AT BEDTIME
Qty: 0 | Refills: 0 | Status: DISCONTINUED | OUTPATIENT
Start: 2018-10-01 | End: 2018-10-08

## 2018-10-01 RX ORDER — DEXTROSE 50 % IN WATER 50 %
25 SYRINGE (ML) INTRAVENOUS ONCE
Qty: 0 | Refills: 0 | Status: DISCONTINUED | OUTPATIENT
Start: 2018-10-01 | End: 2018-10-02

## 2018-10-01 RX ORDER — DOCUSATE SODIUM 100 MG
100 CAPSULE ORAL THREE TIMES A DAY
Qty: 0 | Refills: 0 | Status: DISCONTINUED | OUTPATIENT
Start: 2018-10-01 | End: 2018-10-06

## 2018-10-01 RX ORDER — METFORMIN HYDROCHLORIDE 850 MG/1
500 TABLET ORAL
Qty: 0 | Refills: 0 | Status: DISCONTINUED | OUTPATIENT
Start: 2018-10-02 | End: 2018-10-02

## 2018-10-01 RX ORDER — ACETAMINOPHEN 500 MG
1000 TABLET ORAL ONCE
Qty: 0 | Refills: 0 | Status: COMPLETED | OUTPATIENT
Start: 2018-10-02 | End: 2018-10-02

## 2018-10-01 RX ORDER — ENOXAPARIN SODIUM 100 MG/ML
40 INJECTION SUBCUTANEOUS EVERY 24 HOURS
Qty: 0 | Refills: 0 | Status: DISCONTINUED | OUTPATIENT
Start: 2018-10-02 | End: 2018-10-08

## 2018-10-01 RX ORDER — SODIUM CHLORIDE 9 MG/ML
1000 INJECTION, SOLUTION INTRAVENOUS
Qty: 0 | Refills: 0 | Status: DISCONTINUED | OUTPATIENT
Start: 2018-10-01 | End: 2018-10-02

## 2018-10-01 RX ORDER — METOPROLOL TARTRATE 50 MG
100 TABLET ORAL DAILY
Qty: 0 | Refills: 0 | Status: DISCONTINUED | OUTPATIENT
Start: 2018-10-01 | End: 2018-10-08

## 2018-10-01 RX ORDER — HYDROMORPHONE HYDROCHLORIDE 2 MG/ML
0.5 INJECTION INTRAMUSCULAR; INTRAVENOUS; SUBCUTANEOUS
Qty: 0 | Refills: 0 | Status: DISCONTINUED | OUTPATIENT
Start: 2018-10-01 | End: 2018-10-01

## 2018-10-01 RX ORDER — SODIUM CHLORIDE 9 MG/ML
1000 INJECTION, SOLUTION INTRAVENOUS
Qty: 0 | Refills: 0 | Status: DISCONTINUED | OUTPATIENT
Start: 2018-10-01 | End: 2018-10-01

## 2018-10-01 RX ORDER — PANTOPRAZOLE SODIUM 20 MG/1
40 TABLET, DELAYED RELEASE ORAL
Qty: 0 | Refills: 0 | Status: DISCONTINUED | OUTPATIENT
Start: 2018-10-01 | End: 2018-10-06

## 2018-10-01 RX ORDER — ACETAMINOPHEN 500 MG
975 TABLET ORAL EVERY 8 HOURS
Qty: 0 | Refills: 0 | Status: DISCONTINUED | OUTPATIENT
Start: 2018-10-02 | End: 2018-10-06

## 2018-10-01 RX ORDER — LOSARTAN POTASSIUM 100 MG/1
50 TABLET, FILM COATED ORAL DAILY
Qty: 0 | Refills: 0 | Status: DISCONTINUED | OUTPATIENT
Start: 2018-10-03 | End: 2018-10-08

## 2018-10-01 RX ORDER — CHLORHEXIDINE GLUCONATE 213 G/1000ML
1 SOLUTION TOPICAL ONCE
Qty: 0 | Refills: 0 | Status: COMPLETED | OUTPATIENT
Start: 2018-10-01 | End: 2018-10-01

## 2018-10-01 RX ORDER — CEFAZOLIN SODIUM 1 G
2000 VIAL (EA) INJECTION EVERY 8 HOURS
Qty: 0 | Refills: 0 | Status: COMPLETED | OUTPATIENT
Start: 2018-10-01 | End: 2018-10-02

## 2018-10-01 RX ORDER — ONDANSETRON 8 MG/1
4 TABLET, FILM COATED ORAL ONCE
Qty: 0 | Refills: 0 | Status: DISCONTINUED | OUTPATIENT
Start: 2018-10-01 | End: 2018-10-01

## 2018-10-01 RX ORDER — ACETAMINOPHEN 500 MG
1000 TABLET ORAL ONCE
Qty: 0 | Refills: 0 | Status: COMPLETED | OUTPATIENT
Start: 2018-10-01 | End: 2018-10-01

## 2018-10-01 RX ORDER — SODIUM CHLORIDE 9 MG/ML
1000 INJECTION, SOLUTION INTRAVENOUS
Qty: 0 | Refills: 0 | Status: DISCONTINUED | OUTPATIENT
Start: 2018-10-01 | End: 2018-10-03

## 2018-10-01 RX ORDER — DEXTROSE 50 % IN WATER 50 %
12.5 SYRINGE (ML) INTRAVENOUS ONCE
Qty: 0 | Refills: 0 | Status: DISCONTINUED | OUTPATIENT
Start: 2018-10-01 | End: 2018-10-02

## 2018-10-01 RX ORDER — INSULIN LISPRO 100/ML
VIAL (ML) SUBCUTANEOUS
Qty: 0 | Refills: 0 | Status: DISCONTINUED | OUTPATIENT
Start: 2018-10-01 | End: 2018-10-02

## 2018-10-01 RX ORDER — SIMVASTATIN 20 MG/1
40 TABLET, FILM COATED ORAL AT BEDTIME
Qty: 0 | Refills: 0 | Status: DISCONTINUED | OUTPATIENT
Start: 2018-10-01 | End: 2018-10-01

## 2018-10-01 RX ORDER — MAGNESIUM HYDROXIDE 400 MG/1
30 TABLET, CHEWABLE ORAL DAILY
Qty: 0 | Refills: 0 | Status: DISCONTINUED | OUTPATIENT
Start: 2018-10-01 | End: 2018-10-06

## 2018-10-01 RX ORDER — CELECOXIB 200 MG/1
200 CAPSULE ORAL
Qty: 0 | Refills: 0 | Status: DISCONTINUED | OUTPATIENT
Start: 2018-10-02 | End: 2018-10-04

## 2018-10-01 RX ORDER — HYDROMORPHONE HYDROCHLORIDE 2 MG/ML
0.5 INJECTION INTRAMUSCULAR; INTRAVENOUS; SUBCUTANEOUS
Qty: 0 | Refills: 0 | Status: DISCONTINUED | OUTPATIENT
Start: 2018-10-01 | End: 2018-10-08

## 2018-10-01 RX ORDER — OXYCODONE HYDROCHLORIDE 5 MG/1
10 TABLET ORAL EVERY 4 HOURS
Qty: 0 | Refills: 0 | Status: DISCONTINUED | OUTPATIENT
Start: 2018-10-01 | End: 2018-10-06

## 2018-10-01 RX ORDER — DEXTROSE 50 % IN WATER 50 %
15 SYRINGE (ML) INTRAVENOUS ONCE
Qty: 0 | Refills: 0 | Status: DISCONTINUED | OUTPATIENT
Start: 2018-10-01 | End: 2018-10-02

## 2018-10-01 RX ORDER — GLUCAGON INJECTION, SOLUTION 0.5 MG/.1ML
1 INJECTION, SOLUTION SUBCUTANEOUS ONCE
Qty: 0 | Refills: 0 | Status: DISCONTINUED | OUTPATIENT
Start: 2018-10-01 | End: 2018-10-08

## 2018-10-01 RX ORDER — OXYCODONE HYDROCHLORIDE 5 MG/1
5 TABLET ORAL EVERY 4 HOURS
Qty: 0 | Refills: 0 | Status: DISCONTINUED | OUTPATIENT
Start: 2018-10-01 | End: 2018-10-07

## 2018-10-01 RX ADMIN — OXYCODONE AND ACETAMINOPHEN 1 TABLET(S): 5; 325 TABLET ORAL at 04:56

## 2018-10-01 RX ADMIN — Medication 1000 MILLIGRAM(S): at 22:45

## 2018-10-01 RX ADMIN — Medication 100 MILLIGRAM(S): at 05:42

## 2018-10-01 RX ADMIN — LOSARTAN POTASSIUM 50 MILLIGRAM(S): 100 TABLET, FILM COATED ORAL at 05:42

## 2018-10-01 RX ADMIN — Medication 100 MILLIGRAM(S): at 22:33

## 2018-10-01 RX ADMIN — OXYCODONE AND ACETAMINOPHEN 1 TABLET(S): 5; 325 TABLET ORAL at 03:58

## 2018-10-01 RX ADMIN — ATORVASTATIN CALCIUM 20 MILLIGRAM(S): 80 TABLET, FILM COATED ORAL at 22:32

## 2018-10-01 RX ADMIN — OXYCODONE AND ACETAMINOPHEN 1 TABLET(S): 5; 325 TABLET ORAL at 09:13

## 2018-10-01 RX ADMIN — CHLORHEXIDINE GLUCONATE 1 APPLICATION(S): 213 SOLUTION TOPICAL at 06:02

## 2018-10-01 RX ADMIN — Medication 100 MILLIGRAM(S): at 22:30

## 2018-10-01 RX ADMIN — Medication 400 MILLIGRAM(S): at 22:30

## 2018-10-01 NOTE — PROGRESS NOTE ADULT - SUBJECTIVE AND OBJECTIVE BOX
Patient is a 73y old  Female who presents with a chief complaint of fall off toilet (01 Oct 2018 11:57)      Patient seen and examined at bedside.    ALLERGIES:  dust (Unknown)  latex (Unknown)  Originally Entered as [Unknown] reaction to [CATHAIR] (Unknown)  Originally Entered as [Unknown] reaction to [MILDEW] (Unknown)  Zosyn (Unknown)    MEDICATIONS  (STANDING):    MEDICATIONS  (PRN):    Vital Signs Last 24 Hrs  T(F): 97.6 (01 Oct 2018 13:29), Max: 98.2 (30 Sep 2018 20:27)  HR: 73 (01 Oct 2018 13:29) (67 - 88)  BP: 114/61 (01 Oct 2018 13:29) (102/52 - 147/61)  RR: 14 (01 Oct 2018 13:29) (14 - 15)  SpO2: 94% (01 Oct 2018 13:29) (93% - 94%)  I&O's Summary    30 Sep 2018 07:01  -  01 Oct 2018 07:00  --------------------------------------------------------  IN: 0 mL / OUT: 0 mL / NET: 0 mL      BMI (kg/m2): 21.2 (10-01-18 @ 13:59)  PHYSICAL EXAM:  General: NAD, A/O x 3  ENT: MMM  Neck: Supple, No JVD  Lungs: Clear to auscultation bilaterally  Cardio: RRR, S1/S2, No murmurs  Abdomen: Soft, Nontender, Nondistended; Bowel sounds present  Extremities: No calf tenderness, No pitting edema    LABS:                        11.3   11.1  )-----------( 147      ( 01 Oct 2018 05:55 )             35.0       10-01    135  |  100  |  23  ----------------------------<  151  4.2   |  28  |  0.97    Ca    8.6      01 Oct 2018 05:55  Mg     1.2     09-30    TPro  7.0  /  Alb  3.2  /  TBili  0.2  /  DBili  x   /  AST  21  /  ALT  24  /  AlkPhos  67  09-29     eGFR if Non African American: 58 mL/min/1.73M2 (10-01-18 @ 05:55)  eGFR if African American: 67 mL/min/1.73M2 (10-01-18 @ 05:55)    PT/INR - ( 29 Sep 2018 16:32 )   PT: 10.9 sec;   INR: 0.98 ratio         PTT - ( 29 Sep 2018 16:32 )  PTT:33.5 sec     CARDIAC MARKERS ( 29 Sep 2018 16:32 )  <.017 ng/mL / x     / 125 U/L / x     / x        CAPILLARY BLOOD GLUCOSE    POCT Blood Glucose.: 161 mg/dL (30 Sep 2018 20:43)    09-30 [id number] 5.7    RADIOLOGY & ADDITIONAL TESTS:    Care Discussed with Consultants/Other Providers:

## 2018-10-01 NOTE — BRIEF OPERATIVE NOTE - PROCEDURE
<<-----Click on this checkbox to enter Procedure Arthroplasty of right shoulder using humeral head prosthesis  10/01/2018    Active  JUSTEN

## 2018-10-01 NOTE — BRIEF OPERATIVE NOTE - POST-OP DX
Other closed displaced fracture of proximal end of right humerus, initial encounter  10/01/2018    Active  Brandon Morales

## 2018-10-01 NOTE — PROGRESS NOTE ADULT - SUBJECTIVE AND OBJECTIVE BOX
Follow up for fall  SUBJ: patient offers no complaints save for pain in her right arm. No cp no sob  PMH  Alcohol abuse  HTN (hypertension)  Asthma  DM (diabetes mellitus)      MEDICATIONS  (STANDING):  chlordiazePOXIDE 25 milliGRAM(s) Oral three times a day  dextrose 5%. 1000 milliLiter(s) (50 mL/Hr) IV Continuous <Continuous>  dextrose 50% Injectable 12.5 Gram(s) IV Push once  dextrose 50% Injectable 25 Gram(s) IV Push once  dextrose 50% Injectable 25 Gram(s) IV Push once  folic acid 1 milliGRAM(s) Oral daily  heparin  Injectable 5000 Unit(s) SubCutaneous every 12 hours  losartan 50 milliGRAM(s) Oral daily  metoprolol succinate  milliGRAM(s) Oral daily  multivitamin 1 Tablet(s) Oral daily  thiamine 100 milliGRAM(s) Oral daily    MEDICATIONS  (PRN):  dextrose 40% Gel 15 Gram(s) Oral once PRN Blood Glucose LESS THAN 70 milliGRAM(s)/deciliter  glucagon  Injectable 1 milliGRAM(s) IntraMuscular once PRN Glucose LESS THAN 70 milligrams/deciliter  LORazepam   Injectable 1 milliGRAM(s) IntraMuscular every 1 hour PRN CIWA-Ar score 8 or greater  oxyCODONE    5 mG/acetaminophen 325 mG 1 Tablet(s) Oral every 4 hours PRN Moderate Pain (4 - 6)        PHYSICAL EXAM:  Vital Signs Last 24 Hrs  T(C): 36.7 (01 Oct 2018 08:24), Max: 36.8 (30 Sep 2018 20:27)  T(F): 98.1 (01 Oct 2018 08:24), Max: 98.2 (30 Sep 2018 20:27)  HR: 78 (01 Oct 2018 08:24) (67 - 88)  BP: 147/61 (01 Oct 2018 08:24) (102/52 - 147/61)  BP(mean): --  RR: 15 (01 Oct 2018 08:24) (14 - 15)  SpO2: 93% (01 Oct 2018 08:24) (93% - 99%)    GENERAL: NAD, well-groomed, well-developed  HEAD:  Atraumatic, Normocephalic  EYES: EOMI, PERRLA, conjunctiva and sclera clear  ENT: Moist mucous membranes,  NECK: Supple, No JVD, no bruits  CHEST/LUNG: Clear to percussion bilaterally; No rales, rhonchi, wheezing, or rubs  HEART: Regular rate and rhythm; No murmurs, rubs, or gallops PMI non displaced.  ABDOMEN: Soft, Nontender, Nondistended; Bowel sounds present  EXTREMITIES:  2+ Peripheral Pulses, No clubbing, cyanosis, or edema  SKIN: No rashes or lesions  NERVOUS SYSTEM:  unable to cooperate      TELEMETRY: rsr with apc's    ECG:    LABS:                        11.3   11.1  )-----------( 147      ( 01 Oct 2018 05:55 )             35.0     10-01    135  |  100  |  23  ----------------------------<  151<H>  4.2   |  28  |  0.97    Ca    8.6      01 Oct 2018 05:55  Mg     1.2     09-30    TPro  7.0  /  Alb  3.2<L>  /  TBili  0.2  /  DBili  x   /  AST  21  /  ALT  24  /  AlkPhos  67  09-29    CARDIAC MARKERS ( 29 Sep 2018 16:32 )  <.017 ng/mL / x     / 125 U/L / x     / x          PT/INR - ( 29 Sep 2018 16:32 )   PT: 10.9 sec;   INR: 0.98 ratio         PTT - ( 29 Sep 2018 16:32 )  PTT:33.5 sec    I&O's Summary    30 Sep 2018 07:01  -  01 Oct 2018 07:00  --------------------------------------------------------  IN: 0 mL / OUT: 0 mL / NET: 0 mL      BNP    RADIOLOGY & ADDITIONAL STUDIES:    ECHO:

## 2018-10-01 NOTE — PROGRESS NOTE ADULT - ASSESSMENT
Impression    Patient with hx of cad s/p fall from toilet.    hemodynamically stable    no evidence of etoh withdrawal at this time

## 2018-10-01 NOTE — PROGRESS NOTE ADULT - ASSESSMENT
72 yo f pmh cad s/p cabg, essential htn (off meds), dyslipidemia, etoh abuse pw fall with right humeral fracture.

## 2018-10-02 DIAGNOSIS — D50.0 IRON DEFICIENCY ANEMIA SECONDARY TO BLOOD LOSS (CHRONIC): ICD-10-CM

## 2018-10-02 LAB
ANION GAP SERPL CALC-SCNC: 5 MMOL/L — SIGNIFICANT CHANGE UP (ref 5–17)
BASOPHILS # BLD AUTO: 0 K/UL — SIGNIFICANT CHANGE UP (ref 0–0.2)
BASOPHILS NFR BLD AUTO: 0.2 % — SIGNIFICANT CHANGE UP (ref 0–2)
BUN SERPL-MCNC: 26 MG/DL — HIGH (ref 7–23)
CALCIUM SERPL-MCNC: 7.7 MG/DL — LOW (ref 8.4–10.5)
CHLORIDE SERPL-SCNC: 101 MMOL/L — SIGNIFICANT CHANGE UP (ref 96–108)
CO2 SERPL-SCNC: 29 MMOL/L — SIGNIFICANT CHANGE UP (ref 22–31)
CREAT SERPL-MCNC: 1.33 MG/DL — HIGH (ref 0.5–1.3)
EOSINOPHIL # BLD AUTO: 0 K/UL — SIGNIFICANT CHANGE UP (ref 0–0.5)
EOSINOPHIL NFR BLD AUTO: 0 % — SIGNIFICANT CHANGE UP (ref 0–6)
GLUCOSE SERPL-MCNC: 207 MG/DL — HIGH (ref 70–99)
HCT VFR BLD CALC: 27.1 % — LOW (ref 34.5–45)
HGB BLD-MCNC: 8.9 G/DL — LOW (ref 11.5–15.5)
LYMPHOCYTES # BLD AUTO: 0.9 K/UL — LOW (ref 1–3.3)
LYMPHOCYTES # BLD AUTO: 9.4 % — LOW (ref 13–44)
MCHC RBC-ENTMCNC: 32.8 GM/DL — SIGNIFICANT CHANGE UP (ref 32–36)
MCHC RBC-ENTMCNC: 34.4 PG — HIGH (ref 27–34)
MCV RBC AUTO: 104.9 FL — HIGH (ref 80–100)
MONOCYTES # BLD AUTO: 0.5 K/UL — SIGNIFICANT CHANGE UP (ref 0–0.9)
MONOCYTES NFR BLD AUTO: 5.2 % — SIGNIFICANT CHANGE UP (ref 2–14)
NEUTROPHILS # BLD AUTO: 8.2 K/UL — HIGH (ref 1.8–7.4)
NEUTROPHILS NFR BLD AUTO: 85.2 % — HIGH (ref 43–77)
PLATELET # BLD AUTO: 118 K/UL — LOW (ref 150–400)
POTASSIUM SERPL-MCNC: 4.9 MMOL/L — SIGNIFICANT CHANGE UP (ref 3.5–5.3)
POTASSIUM SERPL-SCNC: 4.9 MMOL/L — SIGNIFICANT CHANGE UP (ref 3.5–5.3)
RBC # BLD: 2.58 M/UL — LOW (ref 3.8–5.2)
RBC # FLD: 13 % — SIGNIFICANT CHANGE UP (ref 10.3–14.5)
SODIUM SERPL-SCNC: 135 MMOL/L — SIGNIFICANT CHANGE UP (ref 135–145)
WBC # BLD: 9.6 K/UL — SIGNIFICANT CHANGE UP (ref 3.8–10.5)
WBC # FLD AUTO: 9.6 K/UL — SIGNIFICANT CHANGE UP (ref 3.8–10.5)

## 2018-10-02 PROCEDURE — 99233 SBSQ HOSP IP/OBS HIGH 50: CPT

## 2018-10-02 PROCEDURE — 99232 SBSQ HOSP IP/OBS MODERATE 35: CPT

## 2018-10-02 RX ADMIN — SODIUM CHLORIDE 100 MILLILITER(S): 9 INJECTION, SOLUTION INTRAVENOUS at 05:12

## 2018-10-02 RX ADMIN — Medication 975 MILLIGRAM(S): at 21:04

## 2018-10-02 RX ADMIN — CELECOXIB 200 MILLIGRAM(S): 200 CAPSULE ORAL at 18:44

## 2018-10-02 RX ADMIN — Medication 100 MILLIGRAM(S): at 05:13

## 2018-10-02 RX ADMIN — ATORVASTATIN CALCIUM 20 MILLIGRAM(S): 80 TABLET, FILM COATED ORAL at 21:04

## 2018-10-02 RX ADMIN — Medication 400 MILLIGRAM(S): at 15:31

## 2018-10-02 RX ADMIN — Medication 400 MILLIGRAM(S): at 05:13

## 2018-10-02 RX ADMIN — ENOXAPARIN SODIUM 40 MILLIGRAM(S): 100 INJECTION SUBCUTANEOUS at 12:23

## 2018-10-02 RX ADMIN — Medication 100 MILLIGRAM(S): at 21:04

## 2018-10-02 RX ADMIN — PANTOPRAZOLE SODIUM 40 MILLIGRAM(S): 20 TABLET, DELAYED RELEASE ORAL at 05:13

## 2018-10-02 RX ADMIN — OXYCODONE HYDROCHLORIDE 10 MILLIGRAM(S): 5 TABLET ORAL at 04:20

## 2018-10-02 RX ADMIN — OXYCODONE HYDROCHLORIDE 10 MILLIGRAM(S): 5 TABLET ORAL at 03:18

## 2018-10-02 RX ADMIN — Medication 1000 MILLIGRAM(S): at 16:01

## 2018-10-02 RX ADMIN — Medication 100 MILLIGRAM(S): at 05:16

## 2018-10-02 RX ADMIN — OXYCODONE HYDROCHLORIDE 10 MILLIGRAM(S): 5 TABLET ORAL at 08:18

## 2018-10-02 RX ADMIN — Medication 1000 MILLIGRAM(S): at 05:30

## 2018-10-02 RX ADMIN — CELECOXIB 200 MILLIGRAM(S): 200 CAPSULE ORAL at 18:14

## 2018-10-02 RX ADMIN — OXYCODONE HYDROCHLORIDE 10 MILLIGRAM(S): 5 TABLET ORAL at 08:48

## 2018-10-02 RX ADMIN — METFORMIN HYDROCHLORIDE 500 MILLIGRAM(S): 850 TABLET ORAL at 05:13

## 2018-10-02 RX ADMIN — OXYCODONE HYDROCHLORIDE 10 MILLIGRAM(S): 5 TABLET ORAL at 12:55

## 2018-10-02 RX ADMIN — OXYCODONE HYDROCHLORIDE 10 MILLIGRAM(S): 5 TABLET ORAL at 20:45

## 2018-10-02 RX ADMIN — OXYCODONE HYDROCHLORIDE 10 MILLIGRAM(S): 5 TABLET ORAL at 12:25

## 2018-10-02 RX ADMIN — Medication 975 MILLIGRAM(S): at 22:00

## 2018-10-02 RX ADMIN — OXYCODONE HYDROCHLORIDE 10 MILLIGRAM(S): 5 TABLET ORAL at 19:54

## 2018-10-02 NOTE — PHYSICAL THERAPY INITIAL EVALUATION ADULT - RANGE OF MOTION EXAMINATION, REHAB EVAL
right UE not assessed/bilateral lower extremity ROM was WFL (within functional limits)/Left UE ROM was WFL (within functional limits)

## 2018-10-02 NOTE — PROGRESS NOTE ADULT - SUBJECTIVE AND OBJECTIVE BOX
Surgery PA Note:  POD#1  S/p Right shoulder hemiarthroplasy Surgery PA Note:  POD#1  S/p Right shoulder hemiarthroplasy     Patient lying in bed this AM appears comfortable except when she moves.  Surgical pain managed with prn pain medication.    Vital Signs Last 24 Hrs  T(C): 36.3 (02 Oct 2018 05:00), Max: 36.8 (01 Oct 2018 21:26)  T(F): 97.4 (02 Oct 2018 05:00), Max: 98.3 (01 Oct 2018 21:26)  HR: 65 (02 Oct 2018 05:00) (65 - 85)  BP: 98/53 (02 Oct 2018 05:00) (98/53 - 160/56)  BP(mean): --  RR: 17 (02 Oct 2018 05:00) (14 - 17)  SpO2: 98% (02 Oct 2018 05:00) (94% - 100%)    PE:    Patient is alert and oriented   Lungs:   CTA   Right Shoulder dressing intact (Aquaqcel dressing)   Small amount of sanguanous drainage on dressing   upper arm tense and ecchymotic   fingers warm good ROM   + radial pulse   Cor:  S1S2  Abd:  soft + bs [place] out no voiding yet   Ext:  calves soft without pain                          8.9    9.6   )-----------( 118      ( 02 Oct 2018 05:50 )             27.1   10-02    135  |  101  |  26<H>  ----------------------------<  207<H>  4.9   |  29  |  1.33<H>    Ca    7.7<L>      02 Oct 2018 05:50

## 2018-10-02 NOTE — PHYSICAL THERAPY INITIAL EVALUATION ADULT - GAIT DEVIATIONS NOTED, PT EVAL
decreased step length/decreased stride length/decreased weight-shifting ability/decreased velocity of limb motion

## 2018-10-02 NOTE — PROGRESS NOTE ADULT - SUBJECTIVE AND OBJECTIVE BOX
Follow up for  ashd  SUBJ:    comfortable sitting in chair post op    PMH  Alcohol abuse  HTN (hypertension)  Asthma  DM (diabetes mellitus)      MEDICATIONS  (STANDING):  acetaminophen   Tablet .. 975 milliGRAM(s) Oral every 8 hours  acetaminophen  IVPB .. 1000 milliGRAM(s) IV Intermittent once  atorvastatin 20 milliGRAM(s) Oral at bedtime  celecoxib 200 milliGRAM(s) Oral two times a day  docusate sodium 100 milliGRAM(s) Oral three times a day  enoxaparin Injectable 40 milliGRAM(s) SubCutaneous every 24 hours  lactated ringers. 1000 milliLiter(s) (100 mL/Hr) IV Continuous <Continuous>  metoprolol succinate  milliGRAM(s) Oral daily  pantoprazole    Tablet 40 milliGRAM(s) Oral before breakfast    MEDICATIONS  (PRN):  glucagon  Injectable 1 milliGRAM(s) IntraMuscular once PRN Glucose LESS THAN 70 milligrams/deciliter  HYDROmorphone  Injectable 0.5 milliGRAM(s) IV Push every 3 hours PRN Severe Pain (7 - 10)  magnesium hydroxide Suspension 30 milliLiter(s) Oral daily PRN Constipation  ondansetron Injectable 4 milliGRAM(s) IV Push every 6 hours PRN Nausea and/or Vomiting  oxyCODONE    IR 10 milliGRAM(s) Oral every 4 hours PRN Moderate Pain (4 - 6)  oxyCODONE    IR 5 milliGRAM(s) Oral every 4 hours PRN Mild Pain (1 - 3)        PHYSICAL EXAM:  Vital Signs Last 24 Hrs  T(C): 36.3 (02 Oct 2018 08:40), Max: 36.8 (01 Oct 2018 21:26)  T(F): 97.4 (02 Oct 2018 08:40), Max: 98.3 (01 Oct 2018 21:26)  HR: 98 (02 Oct 2018 08:40) (65 - 98)  BP: 103/63 (02 Oct 2018 10:38) (90/64 - 160/56)  BP(mean): --  RR: 15 (02 Oct 2018 08:40) (14 - 17)  SpO2: 96% (02 Oct 2018 10:38) (96% - 100%)    GENERAL: NAD, no current symptoms reported sitting in chair  HEAD:  Atraumatic, Normocephalic  EYES: EOMI, PERRLA, conjunctiva and sclera clear  ENT: Moist mucous membranes,  NECK: Supple, No JVD, no bruits  CHEST/LUNG: Clear to percussion bilaterally; No rales, rhonchi, wheezing, or rubs  HEART: Regular rate and rhythm; No murmurs, rubs, or gallops PMI non displaced.  ABDOMEN: Soft, Nontender, Nondistended; Bowel sounds present  EXTREMITIES:  2+ Peripheral Pulses, No clubbing, cyanosis, or edema  SKIN: No rashes or lesions  NERVOUS SYSTEM:  Cranial Nerves II-XII intact      TELEMETRY:  rsr    ECG:    < from: 12 Lead ECG (09.30.18 @ 13:06) >  Ventricular Rate 64 BPM    Atrial Rate 64 BPM    P-R Interval 204 ms    QRS Duration 136 ms    Q-T Interval 528 ms    QTC Calculation(Bezet) 544 ms    P Axis -17 degrees    R Axis -34 degrees    T Axis 72 degrees    Diagnosis Line Normal sinus rhythm  Left axis deviation  Right bundle branch block  Possible Lateral infarct (cited on or before 29-MAY-2018)  Inferior infarct (cited on or before 29-MAY-2018)    When compared with ECG of 29-SEP-2018 15:08,  premature atrial complexes are no longer present  Questionable change in initial forces of Lateral leads  T wave inversion no longer evident in Inferior leads  Confirmed by CAYETANO SCHNEIDER MD ([id number]) on 10/1/2018 9:12:25 AM    < end of copied text >    ECHO:    LABS:                        8.9    9.6   )-----------( 118      ( 02 Oct 2018 05:50 )             27.1     10-02    135  |  101  |  26<H>  ----------------------------<  207<H>  4.9   |  29  |  1.33<H>    Ca    7.7<L>      02 Oct 2018 05:50      I&O's Summary    01 Oct 2018 07:01  -  02 Oct 2018 07:00  --------------------------------------------------------  IN: 1425 mL / OUT: 350 mL / NET: 1075 mL      BNP    RADIOLOGY & ADDITIONAL STUDIES:    < from: Xray Chest 1 View- PORTABLE-Urgent (09.29.18 @ 15:47) >  Chest x-ray:  Sternotomy wires, calcified mediastinal lymph node and surgical clips   again noted. Sutures overlie the right upper lung zone. Probable   calcified granuloma right upper lobe. Nonspecific mild prominence right   hilum without significant change which may be related to pulmonary   vasculature.    No new consolidation or pleural effusion.    7 mm right middle lobe nodule described on prior CT not well seen   radiographically. Refer to CT report    IMPRESSION:  Findings as described above         CELINA MILLS M.D., ATTENDING RADIOLOGIST  This document has been electronically signed. Sep 29 2018  3:50PM        < end of copied text >

## 2018-10-02 NOTE — PROGRESS NOTE ADULT - ASSESSMENT
74 yo f pmh cad s/p cabg, essential htn (off meds), dyslipidemia, etoh abuse pw fall with right humeral fracture s/p repair with Dr. LOPEZ - POD #1

## 2018-10-02 NOTE — PHYSICAL THERAPY INITIAL EVALUATION ADULT - ADDITIONAL COMMENTS
pt lives with  in private home with 2 INOCENCIO with unilateral handrail (right side). pt reports that she owns a cane and rolling walker but does not use either.

## 2018-10-02 NOTE — PROGRESS NOTE ADULT - ASSESSMENT
IMP:  surgically stable           anemia of acute blood loss          ETOH abuse hx           CAD /DM /HTN/asthma    Plan: PT/OT NWB right arm No ROM right arm at this time           check Labs in am           Lovenox for dvt prophylaxis          begin D/c planning

## 2018-10-02 NOTE — PROGRESS NOTE ADULT - SUBJECTIVE AND OBJECTIVE BOX
Patient is a 73y old  Female who presents with a chief complaint of fall off toilet (01 Oct 2018 16:44)    Feels okay.  Pain in shoulder but otherwise no complaints.     Patient seen and examined at bedside.    ALLERGIES:  dust (Unknown)  latex (Unknown)  Originally Entered as [Unknown] reaction to [CATHAIR] (Unknown)  Originally Entered as [Unknown] reaction to [MILDEW] (Unknown)  Zosyn (Unknown)    MEDICATIONS  (STANDING):  acetaminophen   Tablet .. 1000 milliGRAM(s) Oral every 8 hours  acetaminophen  IVPB .. 1000 milliGRAM(s) IV Intermittent once  atorvastatin 20 milliGRAM(s) Oral at bedtime  celecoxib 200 milliGRAM(s) Oral two times a day  dextrose 5%. 1000 milliLiter(s) (50 mL/Hr) IV Continuous <Continuous>  dextrose 50% Injectable 12.5 Gram(s) IV Push once  dextrose 50% Injectable 25 Gram(s) IV Push once  dextrose 50% Injectable 25 Gram(s) IV Push once  docusate sodium 100 milliGRAM(s) Oral three times a day  enoxaparin Injectable 40 milliGRAM(s) SubCutaneous every 24 hours  insulin lispro (HumaLOG) corrective regimen sliding scale   SubCutaneous three times a day before meals  lactated ringers. 1000 milliLiter(s) (100 mL/Hr) IV Continuous <Continuous>  metFORMIN 500 milliGRAM(s) Oral two times a day  metoprolol succinate  milliGRAM(s) Oral daily  pantoprazole    Tablet 40 milliGRAM(s) Oral before breakfast    MEDICATIONS  (PRN):  dextrose 40% Gel 15 Gram(s) Oral once PRN Blood Glucose LESS THAN 70 milliGRAM(s)/deciliter  glucagon  Injectable 1 milliGRAM(s) IntraMuscular once PRN Glucose LESS THAN 70 milligrams/deciliter  HYDROmorphone  Injectable 0.5 milliGRAM(s) IV Push every 3 hours PRN Severe Pain (7 - 10)  magnesium hydroxide Suspension 30 milliLiter(s) Oral daily PRN Constipation  ondansetron Injectable 4 milliGRAM(s) IV Push every 6 hours PRN Nausea and/or Vomiting  oxyCODONE    IR 10 milliGRAM(s) Oral every 4 hours PRN Moderate Pain (4 - 6)  oxyCODONE    IR 5 milliGRAM(s) Oral every 4 hours PRN Mild Pain (1 - 3)    Vital Signs Last 24 Hrs  T(F): 97.4 (02 Oct 2018 05:00), Max: 98.3 (01 Oct 2018 21:26)  HR: 65 (02 Oct 2018 05:00) (65 - 85)  BP: 98/53 (02 Oct 2018 05:00) (98/53 - 160/56)  RR: 17 (02 Oct 2018 05:00) (14 - 17)  SpO2: 98% (02 Oct 2018 05:00) (93% - 100%)  I&O's Summary    01 Oct 2018 07:01  -  02 Oct 2018 07:00  --------------------------------------------------------  IN: 975 mL / OUT: 350 mL / NET: 625 mL    BMI (kg/m2): 31.5 (10-01-18 @ 21:26)  PHYSICAL EXAM:  General: NAD, A/O x 3  ENT: MMM  Neck: Supple, No JVD  Lungs: Clear to auscultation bilaterally  Cardio: RRR, S1/S2, No murmurs  Abdomen: Soft, Nontender, Nondistended; Bowel sounds present  Extremities: No calf tenderness, No pitting edema    LABS:                     8.9    9.6   )-----------( 118      ( 02 Oct 2018 05:50 )             27.1       10-01    135  |  100  |  23  ----------------------------<  151  4.2   |  28  |  0.97    Ca    8.6      01 Oct 2018 05:55  Mg     1.2     09-30    TPro  7.0  /  Alb  3.2  /  TBili  0.2  /  DBili  x   /  AST  21  /  ALT  24  /  AlkPhos  67  09-29     eGFR if Non African American: 58 mL/min/1.73M2 (10-01-18 @ 05:55)  eGFR if African American: 67 mL/min/1.73M2 (10-01-18 @ 05:55)    PT/INR - ( 29 Sep 2018 16:32 )   PT: 10.9 sec;   INR: 0.98 ratio       PTT - ( 29 Sep 2018 16:32 )  PTT:33.5 sec     CARDIAC MARKERS ( 29 Sep 2018 16:32 )  <.017 ng/mL / x     / 125 U/L / x     / x        CAPILLARY BLOOD GLUCOSE    POCT Blood Glucose.: 165 mg/dL (01 Oct 2018 22:53)  POCT Blood Glucose.: 183 mg/dL (01 Oct 2018 20:05)    09-30 [id number] 5.7          RADIOLOGY & ADDITIONAL TESTS:    Care Discussed with Consultants/Other Providers:

## 2018-10-03 PROCEDURE — 99233 SBSQ HOSP IP/OBS HIGH 50: CPT

## 2018-10-03 PROCEDURE — 93010 ELECTROCARDIOGRAM REPORT: CPT

## 2018-10-03 RX ADMIN — OXYCODONE HYDROCHLORIDE 10 MILLIGRAM(S): 5 TABLET ORAL at 17:03

## 2018-10-03 RX ADMIN — SODIUM CHLORIDE 100 MILLILITER(S): 9 INJECTION, SOLUTION INTRAVENOUS at 06:07

## 2018-10-03 RX ADMIN — Medication 975 MILLIGRAM(S): at 22:05

## 2018-10-03 RX ADMIN — CELECOXIB 200 MILLIGRAM(S): 200 CAPSULE ORAL at 06:42

## 2018-10-03 RX ADMIN — Medication 975 MILLIGRAM(S): at 13:11

## 2018-10-03 RX ADMIN — PANTOPRAZOLE SODIUM 40 MILLIGRAM(S): 20 TABLET, DELAYED RELEASE ORAL at 06:05

## 2018-10-03 RX ADMIN — OXYCODONE HYDROCHLORIDE 10 MILLIGRAM(S): 5 TABLET ORAL at 20:52

## 2018-10-03 RX ADMIN — CELECOXIB 200 MILLIGRAM(S): 200 CAPSULE ORAL at 06:05

## 2018-10-03 RX ADMIN — ENOXAPARIN SODIUM 40 MILLIGRAM(S): 100 INJECTION SUBCUTANEOUS at 12:12

## 2018-10-03 RX ADMIN — Medication 975 MILLIGRAM(S): at 21:07

## 2018-10-03 RX ADMIN — Medication 975 MILLIGRAM(S): at 13:41

## 2018-10-03 RX ADMIN — Medication 975 MILLIGRAM(S): at 06:42

## 2018-10-03 RX ADMIN — Medication 100 MILLIGRAM(S): at 06:05

## 2018-10-03 RX ADMIN — SODIUM CHLORIDE 100 MILLILITER(S): 9 INJECTION, SOLUTION INTRAVENOUS at 10:44

## 2018-10-03 RX ADMIN — OXYCODONE HYDROCHLORIDE 10 MILLIGRAM(S): 5 TABLET ORAL at 17:33

## 2018-10-03 RX ADMIN — ATORVASTATIN CALCIUM 20 MILLIGRAM(S): 80 TABLET, FILM COATED ORAL at 21:07

## 2018-10-03 RX ADMIN — Medication 100 MILLIGRAM(S): at 21:07

## 2018-10-03 RX ADMIN — OXYCODONE HYDROCHLORIDE 10 MILLIGRAM(S): 5 TABLET ORAL at 21:05

## 2018-10-03 RX ADMIN — CELECOXIB 200 MILLIGRAM(S): 200 CAPSULE ORAL at 18:47

## 2018-10-03 RX ADMIN — OXYCODONE HYDROCHLORIDE 10 MILLIGRAM(S): 5 TABLET ORAL at 08:48

## 2018-10-03 RX ADMIN — LOSARTAN POTASSIUM 50 MILLIGRAM(S): 100 TABLET, FILM COATED ORAL at 06:05

## 2018-10-03 RX ADMIN — Medication 100 MILLIGRAM(S): at 15:39

## 2018-10-03 RX ADMIN — CELECOXIB 200 MILLIGRAM(S): 200 CAPSULE ORAL at 18:17

## 2018-10-03 RX ADMIN — Medication 975 MILLIGRAM(S): at 06:05

## 2018-10-03 RX ADMIN — OXYCODONE HYDROCHLORIDE 10 MILLIGRAM(S): 5 TABLET ORAL at 09:18

## 2018-10-03 NOTE — PROGRESS NOTE ADULT - ASSESSMENT
74 yo f pmh cad s/p cabg, essential htn (off meds), dyslipidemia, etoh abuse pw fall with right humeral fracture sp right venu arthroplasty pod# 2  + vtach runs cw bb cw cardiac monitoring ischemia workup once medically stable

## 2018-10-03 NOTE — PROGRESS NOTE ADULT - ASSESSMENT
surgically stable   anemia of acute blood loss   run of VT   CAD /s/p CABG   hx of ETOH     PLan:  check labs             continue PT              Will begin upper extremity ROM when ok with Dr Jimenez             As per Medicine/Cardio

## 2018-10-03 NOTE — PROGRESS NOTE ADULT - SUBJECTIVE AND OBJECTIVE BOX
Patient is a 73y old  Female who presents with a chief complaint of fall off toilet (03 Oct 2018 09:41)      Patient seen and examined at bedside.    ALLERGIES:  dust (Unknown)  latex (Unknown)  Originally Entered as [Unknown] reaction to [CATHAIR] (Unknown)  Originally Entered as [Unknown] reaction to [MILDEW] (Unknown)  Zosyn (Unknown)    MEDICATIONS:  atorvastatin 20 milliGRAM(s) Oral at bedtime    Vital Signs Last 24 Hrs  T(F): 97.1 (03 Oct 2018 15:46), Max: 97.8 (02 Oct 2018 20:34)  HR: 60 (03 Oct 2018 15:46) (60 - 71)  BP: 110/49 (03 Oct 2018 15:46) (100/49 - 121/58)  RR: 16 (03 Oct 2018 15:46) (14 - 16)  SpO2: 93% (03 Oct 2018 15:46) (91% - 98%)  I&O's Summary    02 Oct 2018 07:01  -  03 Oct 2018 07:00  --------------------------------------------------------  IN: 2250 mL / OUT: 500 mL / NET: 1750 mL    03 Oct 2018 07:01  -  03 Oct 2018 16:17  --------------------------------------------------------  IN: 600 mL / OUT: 1 mL / NET: 599 mL        PHYSICAL EXAM:  General: NAD, A/O x 2  ENT: MMM  Neck: Supple, No JVD  Lungs: Clear to auscultation bilaterally  Cardio: RRR, S1/S2, No murmurs  Abdomen: Soft, Nontender, Nondistended; Bowel sounds present  Extremities: No cyanosis, No edema right arm in sling    LABS:                        8.9    9.6   )-----------( 118      ( 02 Oct 2018 05:50 )             27.1     10-03    135  |  102  |  28  ----------------------------<  160  4.6   |  28  |  1.20    Ca    8.0      03 Oct 2018 06:35      eGFR if Non African American: 45 mL/min/1.73M2 (10-03-18 @ 06:35)  eGFR if African American: 52 mL/min/1.73M2 (10-03-18 @ 06:35)                    CAPILLARY BLOOD GLUCOSE        09-30 [id number] 5.7          RADIOLOGY & ADDITIONAL TESTS:    Care Discussed with Consultants/Other Providers:

## 2018-10-03 NOTE — PROGRESS NOTE ADULT - SUBJECTIVE AND OBJECTIVE BOX
Surgery PA Note:  POD #2  S/P Right shoulder venu-arthroplasty    Patient is lying in bed this am s/p EKG.  Patient had 14 beats of VT this morning.   She denies chest pain or SOB.  Patient was   sitting up this morning eating breakfast during the event.   Patient still c/o 6/10 surgical pain relieved with narcotics.    Vital Signs Last 24 Hrs  T(C): 36.4 (03 Oct 2018 08:38), Max: 36.8 (02 Oct 2018 16:00)  T(F): 97.6 (03 Oct 2018 08:38), Max: 98.2 (02 Oct 2018 16:00)  HR: 66 (03 Oct 2018 08:38) (66 - 71)  BP: 105/49 (03 Oct 2018 08:38) (94/58 - 121/58)  BP(mean): --  RR: 14 (03 Oct 2018 08:38) (14 - 16)  SpO2: 94% (03 Oct 2018 08:38) (94% - 98%)    PE:  Patient is alert and oriented answers questions appropriately  Lung:  CTA   Cor:  S1S2   Abdomen:   soft non tender + BS -BM                      tolerating diet and voiding adequately   Right Shoulder Dressing removed Incision C/D/I nylon sutures in place   Upper arm tense and ecchymotic +2 radial pulse   fingers warm good ROM  + 5/5 strength  Ext:  calves soft non tender                 Labs pending

## 2018-10-03 NOTE — PROGRESS NOTE ADULT - SUBJECTIVE AND OBJECTIVE BOX
SUBJ:  Patient is a 73y old  Female who presents with a chief complaint of fall off toilet (03 Oct 2018 08:53)    complains of pain right arm     PAST MEDICAL & SURGICAL HISTORY:  Alcohol abuse  HTN (hypertension)  Asthma  DM (diabetes mellitus)  S/P CABG x 4      MEDICATIONS  (STANDING):  acetaminophen   Tablet .. 975 milliGRAM(s) Oral every 8 hours  atorvastatin 20 milliGRAM(s) Oral at bedtime  celecoxib 200 milliGRAM(s) Oral two times a day  docusate sodium 100 milliGRAM(s) Oral three times a day  enoxaparin Injectable 40 milliGRAM(s) SubCutaneous every 24 hours  lactated ringers. 1000 milliLiter(s) (100 mL/Hr) IV Continuous <Continuous>  losartan 50 milliGRAM(s) Oral daily  metoprolol succinate  milliGRAM(s) Oral daily  pantoprazole    Tablet 40 milliGRAM(s) Oral before breakfast    MEDICATIONS  (PRN):  bisacodyl Suppository 10 milliGRAM(s) Rectal daily PRN If no bowel movement by POD # 2  glucagon  Injectable 1 milliGRAM(s) IntraMuscular once PRN Glucose LESS THAN 70 milligrams/deciliter  HYDROmorphone  Injectable 0.5 milliGRAM(s) IV Push every 3 hours PRN Severe Pain (7 - 10)  magnesium hydroxide Suspension 30 milliLiter(s) Oral daily PRN Constipation  ondansetron Injectable 4 milliGRAM(s) IV Push every 6 hours PRN Nausea and/or Vomiting  oxyCODONE    IR 10 milliGRAM(s) Oral every 4 hours PRN Moderate Pain (4 - 6)  oxyCODONE    IR 5 milliGRAM(s) Oral every 4 hours PRN Mild Pain (1 - 3)          Vital Signs Last 24 Hrs  T(C): 36.4 (03 Oct 2018 08:38), Max: 36.8 (02 Oct 2018 16:00)  T(F): 97.6 (03 Oct 2018 08:38), Max: 98.2 (02 Oct 2018 16:00)  HR: 66 (03 Oct 2018 08:38) (66 - 71)  BP: 105/49 (03 Oct 2018 08:38) (94/58 - 121/58)  BP(mean): --  RR: 14 (03 Oct 2018 08:38) (14 - 16)  SpO2: 94% (03 Oct 2018 08:38) (94% - 98%)    REVIEW OF SYSTEMS:  CONSTITUTIONAL: feels weak  RESPIRATORY: No cough, wheezing, chills or hemoptysis; No shortness of breath  CARDIOVASCULAR: No chest pain or chest pressure.  No shortness of breath or dyspnea on exertion.  No palpitations, dizziness, light headedness, syncope or near syncope.  No edema, no orthopnea.         PHYSICAL EXAM  Constitutional:  WDWN. No acute distress  HEENT: normocephalic, atraumatic.  PERRLA. EOMI  Neck : No JVD. no carotid bruits  Lungs:  clear to auscultation bilaterally. no rhonchi. no wheezing  Heart:  S1 and S2. No S3, S4. II/VI systolic murmur.  Abdomen:  soft, non tender.  Extremities: No clubbing, cyanoisis or edema  Nuerologic:  A+O x 3. No focal deficits  Skin:  no rashes    TELEMETRY SR, one run of wide complex tachycardia 14 beats... appears to be consistent with non sustained VT    ECG < from: 12 Lead ECG (09.30.18 @ 13:06) >  Normal sinus rhythm  Left axis deviation  Right bundle branch block  Possible Lateral infarct (cited on or before 29-MAY-2018)  Inferior infarct (cited on or before 29-MAY-2018)    When compared with ECG of 29-SEP-2018 15:08,  premature atrial complexes are no longer present  Questionable change in initial forces of Lateral leads  T wave inversion no longer evident in Inferior leads    < end of copied text >      TTE < from: TTE Echo Complete w/Doppler (09.30.18 @ 09:13) >  1. Mildly to moderately decreased segmental left ventricular systolic   function.   2. Basal inferolateral segment, basal inferior segment, and mid   inferolateral segment are abnormal as described above.   3. Spectral Doppler shows impaired relaxation pattern of left   ventricular myocardial filling (Grade I diastolic dysfunction).   4. There is severe concentric left ventricular hypertrophy.   5. Mild mitralannular calcification.   6. Thickening of the anterior and posterior mitral valve leaflets.   7. Trace tricuspid regurgitation.   8. Sclerotic aortic valve with normal opening.   9. When compared with an echo 8/9/11 there is no significant interval   change.  10. Discussed with lionel weiner and gregorio guardado.  11. LA volume Index is 37.8 ml/m² ml/m2.  12. The aortic valve mean gradient is 2.2 mmHg consistent with normally   opening aortic valve.    < end of copied text >      LABS:                        8.9    9.6   )-----------( 118      ( 02 Oct 2018 05:50 )             27.1     10-02    135  |  101  |  26<H>  ----------------------------<  207<H>  4.9   |  29  |  1.33<H>    Ca    7.7<L>      02 Oct 2018 05:50              acetaminophen   Tablet .. 975 milliGRAM(s) Oral every 8 hours  atorvastatin 20 milliGRAM(s) Oral at bedtime  bisacodyl Suppository 10 milliGRAM(s) Rectal daily PRN  celecoxib 200 milliGRAM(s) Oral two times a day  docusate sodium 100 milliGRAM(s) Oral three times a day  enoxaparin Injectable 40 milliGRAM(s) SubCutaneous every 24 hours  glucagon  Injectable 1 milliGRAM(s) IntraMuscular once PRN  HYDROmorphone  Injectable 0.5 milliGRAM(s) IV Push every 3 hours PRN  lactated ringers. 1000 milliLiter(s) IV Continuous <Continuous>  losartan 50 milliGRAM(s) Oral daily  magnesium hydroxide Suspension 30 milliLiter(s) Oral daily PRN  metoprolol succinate  milliGRAM(s) Oral daily  ondansetron Injectable 4 milliGRAM(s) IV Push every 6 hours PRN  oxyCODONE    IR 10 milliGRAM(s) Oral every 4 hours PRN  oxyCODONE    IR 5 milliGRAM(s) Oral every 4 hours PRN  pantoprazole    Tablet 40 milliGRAM(s) Oral before breakfast    I&O's Summary    02 Oct 2018 07:01  -  03 Oct 2018 07:00  --------------------------------------------------------  IN: 2250 mL / OUT: 500 mL / NET: 1750 mL

## 2018-10-03 NOTE — PROGRESS NOTE ADULT - ASSESSMENT
73 year old woman s/p fall, alcohol intoxication. s/p shoulder venu arthroplasty.  History of CAD, s/p remote MI, s/p remote CABG. Mild-moderate LV dysfunction.  s/p wide complex tachycardia earlier today... consistent with non sustained VT.  She is hemodynamically stable.    Plan  1. Continue high dose beta blocker.  2. Continue ARB.  3. Diuresis as needed, no current evidence of fluid overload.  4. Follow electrolytes.  5. Watch for DTs and pain control   6. Continue telemetry monitoring  7. May need to consider ischemia evaluation post stabilization of medical and surgical issues.

## 2018-10-04 DIAGNOSIS — R06.02 SHORTNESS OF BREATH: ICD-10-CM

## 2018-10-04 DIAGNOSIS — I50.33 ACUTE ON CHRONIC DIASTOLIC (CONGESTIVE) HEART FAILURE: ICD-10-CM

## 2018-10-04 LAB
ANION GAP SERPL CALC-SCNC: 8 MMOL/L — SIGNIFICANT CHANGE UP (ref 5–17)
BASOPHILS # BLD AUTO: 0.1 K/UL — SIGNIFICANT CHANGE UP (ref 0–0.2)
BASOPHILS NFR BLD AUTO: 0.9 % — SIGNIFICANT CHANGE UP (ref 0–2)
BUN SERPL-MCNC: 30 MG/DL — HIGH (ref 7–23)
CALCIUM SERPL-MCNC: 9.3 MG/DL — SIGNIFICANT CHANGE UP (ref 8.4–10.5)
CHLORIDE SERPL-SCNC: 103 MMOL/L — SIGNIFICANT CHANGE UP (ref 96–108)
CO2 SERPL-SCNC: 28 MMOL/L — SIGNIFICANT CHANGE UP (ref 22–31)
CREAT SERPL-MCNC: 1.14 MG/DL — SIGNIFICANT CHANGE UP (ref 0.5–1.3)
DRUG SCREEN, SERUM: SIGNIFICANT CHANGE UP
EOSINOPHIL # BLD AUTO: 0.3 K/UL — SIGNIFICANT CHANGE UP (ref 0–0.5)
EOSINOPHIL NFR BLD AUTO: 2.5 % — SIGNIFICANT CHANGE UP (ref 0–6)
GLUCOSE SERPL-MCNC: 117 MG/DL — HIGH (ref 70–99)
HCT VFR BLD CALC: 28.3 % — LOW (ref 34.5–45)
HGB BLD-MCNC: 8.7 G/DL — LOW (ref 11.5–15.5)
LYMPHOCYTES # BLD AUTO: 1.7 K/UL — SIGNIFICANT CHANGE UP (ref 1–3.3)
LYMPHOCYTES # BLD AUTO: 13.9 % — SIGNIFICANT CHANGE UP (ref 13–44)
MCHC RBC-ENTMCNC: 30.9 GM/DL — LOW (ref 32–36)
MCHC RBC-ENTMCNC: 32.5 PG — SIGNIFICANT CHANGE UP (ref 27–34)
MCV RBC AUTO: 105.3 FL — HIGH (ref 80–100)
METHYLMALONATE SERPL-SCNC: 306 NMOL/L — SIGNIFICANT CHANGE UP (ref 0–378)
MONOCYTES # BLD AUTO: 0.7 K/UL — SIGNIFICANT CHANGE UP (ref 0–0.9)
MONOCYTES NFR BLD AUTO: 5.3 % — SIGNIFICANT CHANGE UP (ref 1–9)
NEUTROPHILS # BLD AUTO: 9.5 K/UL — HIGH (ref 1.8–7.4)
NEUTROPHILS NFR BLD AUTO: 77.4 % — HIGH (ref 43–77)
PLATELET # BLD AUTO: 185 K/UL — SIGNIFICANT CHANGE UP (ref 150–400)
POTASSIUM SERPL-MCNC: 4.6 MMOL/L — SIGNIFICANT CHANGE UP (ref 3.5–5.3)
POTASSIUM SERPL-SCNC: 4.6 MMOL/L — SIGNIFICANT CHANGE UP (ref 3.5–5.3)
RBC # BLD: 2.69 M/UL — LOW (ref 3.8–5.2)
RBC # FLD: 13.4 % — SIGNIFICANT CHANGE UP (ref 10.3–14.5)
SODIUM SERPL-SCNC: 139 MMOL/L — SIGNIFICANT CHANGE UP (ref 135–145)
WBC # BLD: 12.3 K/UL — HIGH (ref 3.8–10.5)
WBC # FLD AUTO: 12.3 K/UL — HIGH (ref 3.8–10.5)

## 2018-10-04 PROCEDURE — 71045 X-RAY EXAM CHEST 1 VIEW: CPT | Mod: 26

## 2018-10-04 PROCEDURE — 99232 SBSQ HOSP IP/OBS MODERATE 35: CPT

## 2018-10-04 RX ORDER — FUROSEMIDE 40 MG
20 TABLET ORAL DAILY
Qty: 0 | Refills: 0 | Status: DISCONTINUED | OUTPATIENT
Start: 2018-10-04 | End: 2018-10-04

## 2018-10-04 RX ORDER — IPRATROPIUM/ALBUTEROL SULFATE 18-103MCG
3 AEROSOL WITH ADAPTER (GRAM) INHALATION ONCE
Qty: 0 | Refills: 0 | Status: COMPLETED | OUTPATIENT
Start: 2018-10-04 | End: 2018-10-04

## 2018-10-04 RX ORDER — FUROSEMIDE 40 MG
40 TABLET ORAL ONCE
Qty: 0 | Refills: 0 | Status: COMPLETED | OUTPATIENT
Start: 2018-10-04 | End: 2018-10-04

## 2018-10-04 RX ADMIN — HYDROMORPHONE HYDROCHLORIDE 0.5 MILLIGRAM(S): 2 INJECTION INTRAMUSCULAR; INTRAVENOUS; SUBCUTANEOUS at 01:15

## 2018-10-04 RX ADMIN — Medication 975 MILLIGRAM(S): at 22:00

## 2018-10-04 RX ADMIN — OXYCODONE HYDROCHLORIDE 10 MILLIGRAM(S): 5 TABLET ORAL at 20:47

## 2018-10-04 RX ADMIN — Medication 100 MILLIGRAM(S): at 05:38

## 2018-10-04 RX ADMIN — Medication 975 MILLIGRAM(S): at 06:40

## 2018-10-04 RX ADMIN — OXYCODONE HYDROCHLORIDE 10 MILLIGRAM(S): 5 TABLET ORAL at 19:33

## 2018-10-04 RX ADMIN — Medication 40 MILLIGRAM(S): at 18:14

## 2018-10-04 RX ADMIN — CELECOXIB 200 MILLIGRAM(S): 200 CAPSULE ORAL at 05:38

## 2018-10-04 RX ADMIN — Medication 100 MILLIGRAM(S): at 21:07

## 2018-10-04 RX ADMIN — Medication 3 MILLILITER(S): at 09:10

## 2018-10-04 RX ADMIN — Medication 975 MILLIGRAM(S): at 21:05

## 2018-10-04 RX ADMIN — PANTOPRAZOLE SODIUM 40 MILLIGRAM(S): 20 TABLET, DELAYED RELEASE ORAL at 06:27

## 2018-10-04 RX ADMIN — HYDROMORPHONE HYDROCHLORIDE 0.5 MILLIGRAM(S): 2 INJECTION INTRAMUSCULAR; INTRAVENOUS; SUBCUTANEOUS at 00:58

## 2018-10-04 RX ADMIN — Medication 100 MILLIGRAM(S): at 13:18

## 2018-10-04 RX ADMIN — OXYCODONE HYDROCHLORIDE 10 MILLIGRAM(S): 5 TABLET ORAL at 08:52

## 2018-10-04 RX ADMIN — LOSARTAN POTASSIUM 50 MILLIGRAM(S): 100 TABLET, FILM COATED ORAL at 05:39

## 2018-10-04 RX ADMIN — Medication 975 MILLIGRAM(S): at 13:17

## 2018-10-04 RX ADMIN — Medication 975 MILLIGRAM(S): at 13:47

## 2018-10-04 RX ADMIN — OXYCODONE HYDROCHLORIDE 10 MILLIGRAM(S): 5 TABLET ORAL at 09:22

## 2018-10-04 RX ADMIN — Medication 975 MILLIGRAM(S): at 05:38

## 2018-10-04 RX ADMIN — CELECOXIB 200 MILLIGRAM(S): 200 CAPSULE ORAL at 06:40

## 2018-10-04 RX ADMIN — ENOXAPARIN SODIUM 40 MILLIGRAM(S): 100 INJECTION SUBCUTANEOUS at 11:16

## 2018-10-04 RX ADMIN — Medication 100 MILLIGRAM(S): at 05:39

## 2018-10-04 RX ADMIN — ATORVASTATIN CALCIUM 20 MILLIGRAM(S): 80 TABLET, FILM COATED ORAL at 21:07

## 2018-10-04 NOTE — PROGRESS NOTE ADULT - PROBLEM SELECTOR PROBLEM 2
Alcohol abuse Closed fracture of shaft of right humerus, unspecified fracture morphology, initial encounter Acute on chronic diastolic (congestive) heart failure

## 2018-10-04 NOTE — PROGRESS NOTE ADULT - PROBLEM SELECTOR PLAN 2
continue thiamine folic acid and mtv s/p right venu arthroplasty pod #2 ct pain management awaiting JOSE once medically stable IV lasix 40 mg x 1  cardiology following  will reassess in AM  may need repeat chest x-ray or BNP

## 2018-10-04 NOTE — PROGRESS NOTE ADULT - PROBLEM SELECTOR PROBLEM 7
Prophylactic measure Macrocytic anemia Type 2 diabetes mellitus without complication, without long-term current use of insulin

## 2018-10-04 NOTE — PROGRESS NOTE ADULT - SUBJECTIVE AND OBJECTIVE BOX
Patient is a 73y old  Female who presents with a chief complaint of fall off toilet (03 Oct 2018 16:17)      Patient seen and examined at bedside.  She reported feeling "foggy" and a little short of breath.  Denied any chest pain, denies having any cough.  Reports surgical site having pain with movement.    ALLERGIES:  dust (Unknown)  latex (Unknown)  Originally Entered as [Unknown] reaction to [CATHAIR] (Unknown)  Originally Entered as [Unknown] reaction to [MILDEW] (Unknown)  Zosyn (Unknown)    MEDICATIONS:  ALBUTerol/ipratropium for Nebulization. 3 milliLiter(s) Nebulizer once  atorvastatin 20 milliGRAM(s) Oral at bedtime  furosemide    Tablet 20 milliGRAM(s) Oral daily    Vital Signs Last 24 Hrs  T(F): 98.7 (04 Oct 2018 07:26), Max: 98.7 (04 Oct 2018 07:26)  HR: 70 (04 Oct 2018 10:39) (60 - 78)  BP: 122/49 (04 Oct 2018 10:39) (100/49 - 139/56)  RR: 14 (04 Oct 2018 07:26) (14 - 16)  SpO2: 97% (04 Oct 2018 10:39) (83% - 97%)  I&O's Summary    03 Oct 2018 07:01  -  04 Oct 2018 07:00  --------------------------------------------------------  IN: 1900 mL / OUT: 1 mL / NET: 1899 mL    04 Oct 2018 07:01  -  04 Oct 2018 10:42  --------------------------------------------------------  IN: 120 mL / OUT: 0 mL / NET: 120 mL        PHYSICAL EXAM:  General: NAD, A/O x 3, use of abd muscles with breathing  ENT: MMM  Neck: Supple, No JVD  Lungs: Bilateral lower crackles  Cardio: RRR, S1/S2, systolic murmur  Abdomen: Soft, Nontender, Nondistended; Bowel sounds present  Extremities: No cyanosis, No edema    LABS:                        8.7    12.3  )-----------( 185      ( 04 Oct 2018 06:12 )             28.3     10-04    139  |  103  |  30  ----------------------------<  117  4.6   |  28  |  1.14    Ca    9.3      04 Oct 2018 06:12      eGFR if Non African American: 48 mL/min/1.73M2 (10-04-18 @ 06:12)  eGFR if African American: 55 mL/min/1.73M2 (10-04-18 @ 06:12)    < from: Xray Chest 1 View-PORTABLE IMMEDIATE (10.04.18 @ 10:06) >  FINDINGS:     The patient isstatus post median sternotomy and CABG. Surgical sutures   are seen within the right upper lung. There is been interval placement of   a right shoulder hemiarthroplasty. There is increasing opacity in the   right lower lung with a small right pleuraleffusion. There is no   pneumothorax. The cardiac silhouette is enlarged as on prior exam.    IMPRESSION:     Small right pleural effusion and increasing opacity in the right lower   lung, likely atelectasis.    < end of copied text >    CAPILLARY BLOOD GLUCOSE        09-30 [id number] 5.7    RADIOLOGY & ADDITIONAL TESTS:    Care Discussed with Consultants/Other Providers: Patient is a 73y old  Female who presents with a chief complaint of fall off toilet (03 Oct 2018 16:17)      Patient seen and examined at bedside.  She reported feeling "foggy" and a little short of breath.  Denied any chest pain, denies having any cough.  Reports surgical site having pain with movement.    ALLERGIES:  dust (Unknown)  latex (Unknown)  Originally Entered as [Unknown] reaction to [CATHAIR] (Unknown)  Originally Entered as [Unknown] reaction to [MILDEW] (Unknown)  Zosyn (Unknown)    MEDICATIONS:  ALBUTerol/ipratropium for Nebulization. 3 milliLiter(s) Nebulizer once  atorvastatin 20 milliGRAM(s) Oral at bedtime  furosemide    Tablet 20 milliGRAM(s) Oral daily    Vital Signs Last 24 Hrs  T(F): 98.7 (04 Oct 2018 07:26), Max: 98.7 (04 Oct 2018 07:26)  HR: 70 (04 Oct 2018 10:39) (60 - 78)  BP: 122/49 (04 Oct 2018 10:39) (100/49 - 139/56)  RR: 14 (04 Oct 2018 07:26) (14 - 16)  SpO2: 97% (04 Oct 2018 10:39) (83% - 97%)  I&O's Summary    03 Oct 2018 07:01  -  04 Oct 2018 07:00  --------------------------------------------------------  IN: 1900 mL / OUT: 1 mL / NET: 1899 mL    04 Oct 2018 07:01  -  04 Oct 2018 10:42  --------------------------------------------------------  IN: 120 mL / OUT: 0 mL / NET: 120 mL        PHYSICAL EXAM:  General: NAD, A/O x 3, use of abd muscles with breathing  ENT: MMM  Neck: Supple, No JVD  Lungs: Bilateral lower crackles  Cardio: RRR, S1/S2, systolic murmur  Abdomen: Soft, Nontender, Nondistended; Bowel sounds present  Extremities: No cyanosis, No edema    LABS:                        8.7    12.3  )-----------( 185      ( 04 Oct 2018 06:12 )             28.3     10-04    139  |  103  |  30  ----------------------------<  117  4.6   |  28  |  1.14    Ca    9.3      04 Oct 2018 06:12      eGFR if Non African American: 48 mL/min/1.73M2 (10-04-18 @ 06:12)  eGFR if African American: 55 mL/min/1.73M2 (10-04-18 @ 06:12)  CAPILLARY BLOOD GLUCOSE        09-30 [id number] 5.7    RADIOLOGY & ADDITIONAL TESTS:  < from: Xray Chest 1 View-PORTABLE IMMEDIATE (10.04.18 @ 10:06) >  FINDINGS:     The patient isstatus post median sternotomy and CABG. Surgical sutures   are seen within the right upper lung. There is been interval placement of   a right shoulder hemiarthroplasty. There is increasing opacity in the   right lower lung with a small right pleuraleffusion. There is no   pneumothorax. The cardiac silhouette is enlarged as on prior exam.    IMPRESSION:     Small right pleural effusion and increasing opacity in the right lower   lung, likely atelectasis.    < end of copied text >    Care Discussed with Consultants/Other Providers: Ms. Elizabeth (PA) - Surgery Patient is a 73y old  Female who presents with a chief complaint of fall off toilet (03 Oct 2018 16:17)      Patient seen and examined at bedside.  She reported feeling "foggy" and a little short of breath.  Denied any chest pain, denies having any cough.  Reports surgical site having pain with movement.      ALLERGIES:  dust (Unknown)  latex (Unknown)  Originally Entered as [Unknown] reaction to [CATHAIR] (Unknown)  Originally Entered as [Unknown] reaction to [MILDEW] (Unknown)  Zosyn (Unknown)    MEDICATIONS:  ALBUTerol/ipratropium for Nebulization. 3 milliLiter(s) Nebulizer once  atorvastatin 20 milliGRAM(s) Oral at bedtime  furosemide    Tablet 20 milliGRAM(s) Oral daily    Vital Signs Last 24 Hrs  T(F): 98.7 (04 Oct 2018 07:26), Max: 98.7 (04 Oct 2018 07:26)  HR: 70 (04 Oct 2018 10:39) (60 - 78)  BP: 122/49 (04 Oct 2018 10:39) (100/49 - 139/56)  RR: 14 (04 Oct 2018 07:26) (14 - 16)  SpO2: 97% (04 Oct 2018 10:39) (83% - 97%)  At the bedside patient was saturating 88% and went up to 95 on 2 L  I&O's Summary    03 Oct 2018 07:01  -  04 Oct 2018 07:00  --------------------------------------------------------  IN: 1900 mL / OUT: 1 mL / NET: 1899 mL    04 Oct 2018 07:01  -  04 Oct 2018 10:42  --------------------------------------------------------  IN: 120 mL / OUT: 0 mL / NET: 120 mL        PHYSICAL EXAM:  General: NAD, A/O x 3, use of abd muscles with breathing  ENT: MMM  Neck: Supple, No JVD  Lungs: Bilateral lower crackles  Cardio: RRR, S1/S2, systolic murmur  Abdomen: Soft, Nontender, Nondistended; Bowel sounds present  Extremities: No cyanosis, No edema    LABS:                        8.7    12.3  )-----------( 185      ( 04 Oct 2018 06:12 )             28.3     10-04    139  |  103  |  30  ----------------------------<  117  4.6   |  28  |  1.14    Ca    9.3      04 Oct 2018 06:12      eGFR if Non African American: 48 mL/min/1.73M2 (10-04-18 @ 06:12)  eGFR if African American: 55 mL/min/1.73M2 (10-04-18 @ 06:12)  CAPILLARY BLOOD GLUCOSE        09-30 [id number] 5.7    RADIOLOGY & ADDITIONAL TESTS:  < from: Xray Chest 1 View-PORTABLE IMMEDIATE (10.04.18 @ 10:06) >  FINDINGS:     The patient isstatus post median sternotomy and CABG. Surgical sutures   are seen within the right upper lung. There is been interval placement of   a right shoulder hemiarthroplasty. There is increasing opacity in the   right lower lung with a small right pleuraleffusion. There is no   pneumothorax. The cardiac silhouette is enlarged as on prior exam.    IMPRESSION:     Small right pleural effusion and increasing opacity in the right lower   lung, likely atelectasis.    Care Discussed with Consultants/Other Providers: Ms. Elizabeth (PA) - Surgery

## 2018-10-04 NOTE — PROGRESS NOTE ADULT - PROBLEM SELECTOR PROBLEM 1
Closed fracture of shaft of right humerus, unspecified fracture morphology, initial encounter Shortness of breath at rest

## 2018-10-04 NOTE — PROGRESS NOTE ADULT - PROBLEM SELECTOR PLAN 4
hold antihypertensive as pharmacist states she hasn't picked up meds in a long time  bp stable cw monitoring Pt with no arrythmia over the last 24 hours,  found to have diastolic and systolic failure on echo 9/30, Cardiology consulted 10/3 continue thiamine folic acid and mtv

## 2018-10-04 NOTE — PROGRESS NOTE ADULT - PROBLEM SELECTOR PROBLEM 5
Type 2 diabetes mellitus without complication, without long-term current use of insulin Hypertension, unspecified type Syncope, unspecified syncope type

## 2018-10-04 NOTE — PROGRESS NOTE ADULT - PROBLEM SELECTOR PROBLEM 6
Macrocytic anemia Type 2 diabetes mellitus without complication, without long-term current use of insulin Hypertension, unspecified type

## 2018-10-04 NOTE — PROGRESS NOTE ADULT - PROBLEM SELECTOR PLAN 5
hba1c in process fs stable cw fs monitoring continue losartan Pt with no arrythmia over the last 24 hours,  found to have diastolic and systolic failure on echo 9/30, Cardiology consulted 10/3

## 2018-10-04 NOTE — PROGRESS NOTE ADULT - PROBLEM SELECTOR PLAN 3
Pt with no arrythmia over the last 24 hours,  found to have diastolic and systolic failure continue thiamine folic acid and mtv likely secondary to above (acute on chronic diastolic heart failure)

## 2018-10-04 NOTE — PROGRESS NOTE ADULT - SUBJECTIVE AND OBJECTIVE BOX
Note: Postop Rehab Protocol instructions  0-4 weeks postop:  External rotation (passive) to 45 degrees  passive forward flexion to 135 degrees  passive abduction to 90 degrees  passive abduction and external rotation to 45 degrees  passive abduction and internal rotation to 30 degrees  no active internal rotation for first 6 weeks postop  no active extension for first 6 weeks postop    first 2 weeks postop:  wear shoulder immobilizer day and night  off for gentle exercises only    postop weeks 2-4:  wear shoulder immobilizer during daytime only    Exercises:  postop weeks 0-2:   strength, pendulums  elbow/wrist/hand ROM    postop weeks 2-4:  begin cuff, deltoid, isometrics  limit external rotation to 45 degrees (passive)  supine, upright passive ROM  modalities

## 2018-10-04 NOTE — PROGRESS NOTE ADULT - PROBLEM SELECTOR PLAN 1
s/p right venu arthroplasty pod #2 ct pain management awaiting JOSE once medically stable O2 therapy was started, incentive spirometer reinforced, diurectic started, will monitor for infection

## 2018-10-04 NOTE — PROGRESS NOTE ADULT - ASSESSMENT
Ortho stable   Pleural effusions/ decreased BS /SOB   Hx of CAD/cabg / etoh hx /DM      Plan:  OOB            check CBC /renal in am            No ROM right arm until ok with Dr. JOHN Terry for DVT prophylaxis            Medical Management as per hospitalist

## 2018-10-04 NOTE — PROGRESS NOTE ADULT - ASSESSMENT
73 yr old s/p fall, alcoholism, s/p shoulder venu arthroplasty.  Has a history of essential htn, dyslipidemia,CAD, s/p remote MI, s/p CABG.  Mid-moderate LV dysfunction.  Pt has developed shortness of breath and has new pleural effusion with an increased white count. 73 yr old s/p fall, alcoholism, s/p shoulder venu arthroplasty.  Has a history of essential htn, dyslipidemia,CAD, s/p remote MI, s/p CABG.  Mid-moderate LV dysfunction.  Pt has developed shortness of breath and has new atelectasis/pleural effusion with an increased white count

## 2018-10-04 NOTE — PROGRESS NOTE ADULT - SUBJECTIVE AND OBJECTIVE BOX
Surgery P.A Note:  POD#3    S/P Right Shoulder Hemiarthroplasty     Patient sitting up in bed c/o that she is not feeling well today.  She is having trouble breathing and feels very tired.  Right Shoulder incisional /surgical discomfort improving.    Vital Signs Last 24 Hrs  T(C): 37.1 (04 Oct 2018 07:26), Max: 37.1 (04 Oct 2018 07:26)  T(F): 98.7 (04 Oct 2018 07:26), Max: 98.7 (04 Oct 2018 07:26)  HR: 70 (04 Oct 2018 10:39) (60 - 78)  BP: 122/49 (04 Oct 2018 10:39) (100/49 - 139/56)  BP(mean): --  RR: 14 (04 Oct 2018 07:26) (14 - 16)  SpO2: 97% (04 Oct 2018 10:39) (83% - 97%)    PE:  Patient is alert and oriented but sounds congested.  Lungs:  decreased BS at bases Crackles/wheezes throughout  Cor S1S2   Abd:  soft, non tender +bs -bm            voiding adequately  Right shoulder dressing C/D/I changed yesterday nylon sutures in place   right hand warm fingers still swollen good rom + strength   + radial pulse right arm in immobilizer  Ext:  denies calf pain without pedal edema     10-04    139  |  103  |  30<H>  ----------------------------<  117<H>  4.6   |  28  |  1.14    Ca    9.3      04 Oct 2018 06:12                          8.7    12.3  )-----------( 185      ( 04 Oct 2018 06:12 )             28.3

## 2018-10-04 NOTE — PROGRESS NOTE ADULT - SUBJECTIVE AND OBJECTIVE BOX
Follow up for cad and an abnormal ekg    SUBJ:  comfortable no chest pains but just doesn't feel quite right.     PMH  Alcohol abuse  HTN (hypertension)  Asthma  DM (diabetes mellitus)      MEDICATIONS  (STANDING):  acetaminophen   Tablet .. 975 milliGRAM(s) Oral every 8 hours  ALBUTerol/ipratropium for Nebulization. 3 milliLiter(s) Nebulizer once  atorvastatin 20 milliGRAM(s) Oral at bedtime  celecoxib 200 milliGRAM(s) Oral two times a day  docusate sodium 100 milliGRAM(s) Oral three times a day  enoxaparin Injectable 40 milliGRAM(s) SubCutaneous every 24 hours  furosemide    Tablet 20 milliGRAM(s) Oral daily  losartan 50 milliGRAM(s) Oral daily  metoprolol succinate  milliGRAM(s) Oral daily  pantoprazole    Tablet 40 milliGRAM(s) Oral before breakfast    MEDICATIONS  (PRN):  bisacodyl Suppository 10 milliGRAM(s) Rectal daily PRN If no bowel movement by POD # 2  glucagon  Injectable 1 milliGRAM(s) IntraMuscular once PRN Glucose LESS THAN 70 milligrams/deciliter  HYDROmorphone  Injectable 0.5 milliGRAM(s) IV Push every 3 hours PRN Severe Pain (7 - 10)  magnesium hydroxide Suspension 30 milliLiter(s) Oral daily PRN Constipation  ondansetron Injectable 4 milliGRAM(s) IV Push every 6 hours PRN Nausea and/or Vomiting  oxyCODONE    IR 10 milliGRAM(s) Oral every 4 hours PRN Moderate Pain (4 - 6)  oxyCODONE    IR 5 milliGRAM(s) Oral every 4 hours PRN Mild Pain (1 - 3)        PHYSICAL EXAM:  Vital Signs Last 24 Hrs  T(C): 37.1 (04 Oct 2018 07:26), Max: 37.1 (04 Oct 2018 07:26)  T(F): 98.7 (04 Oct 2018 07:26), Max: 98.7 (04 Oct 2018 07:26)  HR: 70 (04 Oct 2018 10:39) (60 - 78)  BP: 122/49 (04 Oct 2018 10:39) (100/49 - 139/56)  BP(mean): --  RR: 14 (04 Oct 2018 07:26) (14 - 16)  SpO2: 97% (04 Oct 2018 10:39) (83% - 97%)    GENERAL: NAD, well-groomed, well-developed appears stated age  HEAD:  Atraumatic, Normocephalic  EYES: EOMI, PERRLA, conjunctiva and sclera clear  ENT: Moist mucous membranes,  NECK: Supple, No JVD, no bruits  CHEST/LUNG: Clear to percussion bilaterally; No rales, rhonchi, wheezing, or rubs  HEART: Regular rate and rhythm; No murmurs, rubs, or gallops PMI non displaced.  ABDOMEN: Soft, Nontender, Nondistended; Bowel sounds present  EXTREMITIES:  2+ Peripheral Pulses, No clubbing, cyanosis, or edema  SKIN: No rashes or lesions  NERVOUS SYSTEM:  Cranial Nerves II-XII intact      TELEMETRY:    rsr    ECG:    < from: 12 Lead ECG (10.03.18 @ 08:29) >  entricular Rate 63 BPM    Atrial Rate 63 BPM    P-R Interval 176 ms    QRS Duration 142 ms    Q-T Interval 502 ms    QTC Calculation(Bezet) 513 ms    P Axis -78 degrees    R Axis -29 degrees    T Axis 29 degrees    Diagnosis Line Unusual P axis, possible ectopic atrial rhythm  Right bundle branch block  Counterclockwise rotation  Low voltage QRS  Inferior infarct (cited on or before 29-MAY-2018)  Abnormal ECG  When compared with ECG of 30-SEP-2018 13:06,  Ectopic atrial rhythm has replaced Sinus rhythm  Nonspecific T wave abnormality now evident in Inferior leads    Confirmed by RIKKI BENITEZ MD ([id number]) on 10/4/2018 8:29:43 AM    < end of copied text >    ECHO:    < from: TTE Echo Complete w/Doppler (09.30.18 @ 09:13) >  Summary:   1. Mildly to moderately decreased segmental left ventricular systolic   function.   2. Basal inferolateral segment, basal inferior segment, and mid   inferolateral segment are abnormal as described above.   3. Spectral Doppler shows impaired relaxation pattern of left   ventricular myocardial filling (Grade I diastolic dysfunction).   4. There is severe concentric left ventricular hypertrophy.   5. Mild mitralannular calcification.   6. Thickening of the anterior and posterior mitral valve leaflets.   7. Trace tricuspid regurgitation.   8. Sclerotic aortic valve with normal opening.   9. When compared with an echo 8/9/11 there is no significant interval   change.  10. Discussed with lionel weiner and gregorio guardado.  11. LA volume Index is 37.8 ml/m² ml/m2.  12. The aortic valve mean gradient is 2.2 mmHg consistent with normally   opening aortic valve.    [id number] Rikki Benitez MD, [facility] , Electronically signed on 9/30/2018 at   12:27:48 PM       *** Final ***      RIKKI BENITEZ   This document has been electronically signed. Sep 30 2018  9:13AM        < end of copied text >      LABS:                        8.7    12.3  )-----------( 185      ( 04 Oct 2018 06:12 )             28.3     10-04    139  |  103  |  30<H>  ----------------------------<  117<H>  4.6   |  28  |  1.14    Ca    9.3      04 Oct 2018 06:12        I&O's Summary    03 Oct 2018 07:01  -  04 Oct 2018 07:00  --------------------------------------------------------  IN: 1900 mL / OUT: 1 mL / NET: 1899 mL    04 Oct 2018 07:01  -  04 Oct 2018 11:52  --------------------------------------------------------  IN: 120 mL / OUT: 0 mL / NET: 120 mL      BNPSerum Pro-Brain Natriuretic Peptide: 47575 pg/mL (10-04 @ 06:12)      RADIOLOGY & ADDITIONAL STUDIES:    ECHO:

## 2018-10-05 LAB
HCT VFR BLD CALC: 29.1 % — LOW (ref 34.5–45)
HGB BLD-MCNC: 9.1 G/DL — LOW (ref 11.5–15.5)
MCHC RBC-ENTMCNC: 31.2 GM/DL — LOW (ref 32–36)
MCHC RBC-ENTMCNC: 33.3 PG — SIGNIFICANT CHANGE UP (ref 27–34)
MCV RBC AUTO: 106.7 FL — HIGH (ref 80–100)
PLATELET # BLD AUTO: 189 K/UL — SIGNIFICANT CHANGE UP (ref 150–400)
RBC # BLD: 2.73 M/UL — LOW (ref 3.8–5.2)
RBC # FLD: 13.5 % — SIGNIFICANT CHANGE UP (ref 10.3–14.5)
WBC # BLD: 16.6 K/UL — HIGH (ref 3.8–10.5)
WBC # FLD AUTO: 16.6 K/UL — HIGH (ref 3.8–10.5)

## 2018-10-05 PROCEDURE — 99232 SBSQ HOSP IP/OBS MODERATE 35: CPT

## 2018-10-05 RX ORDER — BUDESONIDE AND FORMOTEROL FUMARATE DIHYDRATE 160; 4.5 UG/1; UG/1
2 AEROSOL RESPIRATORY (INHALATION)
Qty: 0 | Refills: 0 | Status: DISCONTINUED | OUTPATIENT
Start: 2018-10-05 | End: 2018-10-08

## 2018-10-05 RX ORDER — FUROSEMIDE 40 MG
60 TABLET ORAL
Qty: 0 | Refills: 0 | Status: DISCONTINUED | OUTPATIENT
Start: 2018-10-05 | End: 2018-10-08

## 2018-10-05 RX ADMIN — HYDROMORPHONE HYDROCHLORIDE 0.5 MILLIGRAM(S): 2 INJECTION INTRAMUSCULAR; INTRAVENOUS; SUBCUTANEOUS at 14:02

## 2018-10-05 RX ADMIN — Medication 975 MILLIGRAM(S): at 14:02

## 2018-10-05 RX ADMIN — Medication 100 MILLIGRAM(S): at 05:16

## 2018-10-05 RX ADMIN — OXYCODONE HYDROCHLORIDE 10 MILLIGRAM(S): 5 TABLET ORAL at 03:33

## 2018-10-05 RX ADMIN — Medication 60 MILLIGRAM(S): at 17:07

## 2018-10-05 RX ADMIN — Medication 975 MILLIGRAM(S): at 22:44

## 2018-10-05 RX ADMIN — PANTOPRAZOLE SODIUM 40 MILLIGRAM(S): 20 TABLET, DELAYED RELEASE ORAL at 06:14

## 2018-10-05 RX ADMIN — Medication 975 MILLIGRAM(S): at 13:32

## 2018-10-05 RX ADMIN — Medication 975 MILLIGRAM(S): at 06:15

## 2018-10-05 RX ADMIN — Medication 100 MILLIGRAM(S): at 13:32

## 2018-10-05 RX ADMIN — LOSARTAN POTASSIUM 50 MILLIGRAM(S): 100 TABLET, FILM COATED ORAL at 05:16

## 2018-10-05 RX ADMIN — Medication 975 MILLIGRAM(S): at 05:17

## 2018-10-05 RX ADMIN — Medication 100 MILLIGRAM(S): at 22:44

## 2018-10-05 RX ADMIN — ENOXAPARIN SODIUM 40 MILLIGRAM(S): 100 INJECTION SUBCUTANEOUS at 11:17

## 2018-10-05 RX ADMIN — Medication 975 MILLIGRAM(S): at 23:44

## 2018-10-05 RX ADMIN — HYDROMORPHONE HYDROCHLORIDE 0.5 MILLIGRAM(S): 2 INJECTION INTRAMUSCULAR; INTRAVENOUS; SUBCUTANEOUS at 13:32

## 2018-10-05 RX ADMIN — ATORVASTATIN CALCIUM 20 MILLIGRAM(S): 80 TABLET, FILM COATED ORAL at 22:43

## 2018-10-05 RX ADMIN — OXYCODONE HYDROCHLORIDE 10 MILLIGRAM(S): 5 TABLET ORAL at 04:30

## 2018-10-05 NOTE — PROGRESS NOTE ADULT - ASSESSMENT
sugically stable   CHF   hx of CABG, Diabetes    PLan:  OOB             Lovenox for DVT prophylaxis             continue sling ( begin gentle exercises tomorrow )            See Protocol as per Dr. LOPEZ on Oct 4             as per medicine/Cardio

## 2018-10-05 NOTE — PROGRESS NOTE ADULT - ASSESSMENT
Patient with CAD, diastolic CHF, some worsening. Arrhythmia as noted.  Suggest diuretic and b blocker.  Continue telemetry.

## 2018-10-05 NOTE — PROGRESS NOTE ADULT - SUBJECTIVE AND OBJECTIVE BOX
Patient is a 73y old  Female who presents with a chief complaint of fall off toilet (05 Oct 2018 09:59)      Patient seen and examined at bedside.  Patient reports having continued shortness of breath, denies any chest pain.  Denies pain at the surgical site.  Reports having increased urination and reports understanding that the urination is secondary to Lasix     ALLERGIES:  dust (Unknown)  latex (Unknown)  Originally Entered as [Unknown] reaction to [CATHAIR] (Unknown)  Originally Entered as [Unknown] reaction to [MILDEW] (Unknown)  Zosyn (Unknown)    MEDICATIONS:  atorvastatin 20 milliGRAM(s) Oral at bedtime  furosemide   Injectable 60 milliGRAM(s) IV Push two times a day    Vital Signs Last 24 Hrs  T(F): 97.5 (05 Oct 2018 07:58), Max: 97.7 (05 Oct 2018 06:01)  HR: 68 (05 Oct 2018 07:58) (61 - 68)  BP: 105/72 (05 Oct 2018 07:58) (105/72 - 148/46)  RR: 14 (05 Oct 2018 07:58) (14 - 16)  SpO2: 95% (05 Oct 2018 07:58) (92% - 97%)  Gabbi AGRAWAL reports that patient was 93-95% when O2 was removed for 2 minutes  I&O's Summary    04 Oct 2018 07:01  -  05 Oct 2018 07:00  --------------------------------------------------------  IN: 660 mL / OUT: 370 mL / NET: 290 mL    05 Oct 2018 07:01  -  05 Oct 2018 10:40  --------------------------------------------------------  IN: 250 mL / OUT: 0 mL / NET: 250 mL    Patient weight is not reliably recorded.    PHYSICAL EXAM:  General: NAD, A/O x 3  ENT: MMM  Lungs: Bilateral crackles R>L  Cardio: RRR, S1/S2  Abdomen: Soft, Nontender, Nondistended; Bowel sounds present  Extremities: No cyanosis, No edema    LABS:                        9.1    16.6  )-----------( 189      ( 05 Oct 2018 06:35 )             29.1     10-04    139  |  103  |  30  ----------------------------<  117  4.6   |  28  |  1.14    Ca    9.3      04 Oct 2018 06:12      eGFR if Non African American: 48 mL/min/1.73M2 (10-04-18 @ 06:12)  eGFR if African American: 55 mL/min/1.73M2 (10-04-18 @ 06:12)  CAPILLARY BLOOD GLUCOSE        09-30 [id number] 5.7          CHF Type:  [   ]  Acute    [  ]Chronic    [  ]Acute on Chronic                       [   ] Systolic  [ x ] Diastolic  [  ] Combined    ANGIOTENSIN CONVERTING ENZYME INHIBITORS (ACE) OR ANGIOTENSIN II RECEPTOR BLOCKER (ARB) OR ANGIOTENSIN RECEPTOR - NEPRILYSIN INHIBITOR (ARNI) FOR EF < 40 % and symptoms  [   ] ACE or ARB  ordered  [   ] ACE or ARB contraindicated due to :   [   ] ARNI ordered   [   ] ARNI contraindicated or not ordered due to [  ] ACE use prior 36 hours  [   ] hyperkalemia  [   ] hypotension                     [   ] renal dysfunction (creatinine greater than 2.5 mg/ dl in men or greater than 2.0 in women) [   ] other  [   ] ACE  ARB or ARNI discretionary due to primary diastolic CHF    LONG ACTING BETA BLOCKER  for EF less than 40 % and symptoms  [   ] Beta blocker ordered  [   ] Beta blocker contraindicated or not ordered due to :  [   ] Beta blocker discretionary due to primary diastolic CHF    ALDOSTERONE ANTAGONIST for EF less than or equal to 35% and New York Heart Association Class II-IV or post MI EF less than 40%  [   ] Aldosterone Antagonist ordered  [   ] Aldosterone Antagonist not ordered or contraindicated due to [   ] renal dysfunction (creatinine greater than 2.5 mg / dL in men or greater than 2.0 mg/dL i women ) or creatinine clearance less than 30 ml / min or potassium greater than 5 mmol/L    [   ] discretionary    EF:  ECHO:         Myocardial perfusion Image (MPI):         Mutigated Acquistition Image (MUGA):         Cardiac catheterization:    DIETARY/ NUTRITION CONSULT   [   ] 2 gram low sodium diet    DISCHARGE APPOINTMENT DATE AND TIME:  [   ] Primary Care physician :   [   ]  Primary cardiologist:  (cardiology appointment recommended for newly diagnosed CHF or re-admitted patients or systolic CHF) RADIOLOGY & ADDITIONAL TESTS:    Care Discussed with Consultants/Other Providers: Dr. Asher

## 2018-10-05 NOTE — PROGRESS NOTE ADULT - SUBJECTIVE AND OBJECTIVE BOX
Follow up for   CHF, CAD.    SUBJ:  More sob today, no chest pain, cough, orthopnea.    PMH  Alcohol abuse  HTN (hypertension)  Asthma  DM (diabetes mellitus)      MEDICATIONS  (STANDING):  acetaminophen   Tablet .. 975 milliGRAM(s) Oral every 8 hours  atorvastatin 20 milliGRAM(s) Oral at bedtime  docusate sodium 100 milliGRAM(s) Oral three times a day  enoxaparin Injectable 40 milliGRAM(s) SubCutaneous every 24 hours  losartan 50 milliGRAM(s) Oral daily  metoprolol succinate  milliGRAM(s) Oral daily  pantoprazole    Tablet 40 milliGRAM(s) Oral before breakfast    MEDICATIONS  (PRN):  bisacodyl Suppository 10 milliGRAM(s) Rectal daily PRN If no bowel movement by POD # 2  glucagon  Injectable 1 milliGRAM(s) IntraMuscular once PRN Glucose LESS THAN 70 milligrams/deciliter  HYDROmorphone  Injectable 0.5 milliGRAM(s) IV Push every 3 hours PRN Severe Pain (7 - 10)  magnesium hydroxide Suspension 30 milliLiter(s) Oral daily PRN Constipation  ondansetron Injectable 4 milliGRAM(s) IV Push every 6 hours PRN Nausea and/or Vomiting  oxyCODONE    IR 10 milliGRAM(s) Oral every 4 hours PRN Moderate Pain (4 - 6)  oxyCODONE    IR 5 milliGRAM(s) Oral every 4 hours PRN Mild Pain (1 - 3)        PHYSICAL EXAM:  Vital Signs Last 24 Hrs  T(C): 36.4 (05 Oct 2018 07:58), Max: 36.5 (05 Oct 2018 06:01)  T(F): 97.5 (05 Oct 2018 07:58), Max: 97.7 (05 Oct 2018 06:01)  HR: 68 (05 Oct 2018 07:58) (61 - 70)  BP: 105/72 (05 Oct 2018 07:58) (105/72 - 148/46)  BP(mean): --  RR: 14 (05 Oct 2018 07:58) (14 - 16)  SpO2: 95% (05 Oct 2018 07:58) (92% - 97%)    GENERAL: NAD, well-groomed, well-developed  HEAD:  Atraumatic, Normocephalic  EYES: EOMI, PERRLA, conjunctiva and sclera clear  ENT: Moist mucous membranes,  NECK: Supple, No JVD, no bruits  CHEST/LUNG: Diminished BS bases. No rales, rhonchi, wheezing, or rubs  HEART: Regular rate and rhythm; No murmurs, rubs, or gallops PMI non displaced.  ABDOMEN: Soft, Nontender, Nondistended; Bowel sounds present  EXTREMITIES: , No clubbing, cyanosis, or edema  SKIN: No rashes or lesions  NERVOUS SYSTEM:  Alert & Oriented X3      TELEMETRY: sinus nsvt    LABS:                        9.1    16.6  )-----------( 189      ( 05 Oct 2018 06:35 )             29.1     10-04    139  |  103  |  30<H>  ----------------------------<  117<H>  4.6   |  28  |  1.14    Ca    9.3      04 Oct 2018 06:12          I&O's Summary    04 Oct 2018 07:01  -  05 Oct 2018 07:00  --------------------------------------------------------  IN: 660 mL / OUT: 370 mL / NET: 290 mL    05 Oct 2018 07:01  -  05 Oct 2018 10:00  --------------------------------------------------------  IN: 250 mL / OUT: 0 mL / NET: 250 mL      BNP    RADIOLOGY & ADDITIONAL STUDIES:    ECHO:

## 2018-10-05 NOTE — PROGRESS NOTE ADULT - PROBLEM SELECTOR PLAN 3
likely secondary to above (acute on chronic diastolic heart failure)  CBC ordered for the morning  will continue to evaluate

## 2018-10-05 NOTE — PROGRESS NOTE ADULT - ASSESSMENT
73 yr old s/p fall, alcoholism, s/p shoulder venu arthroplasty.  Has a history of essential htn, dyslipidemia,CAD, s/p remote MI, s/p CABG.  Mid-moderate LV dysfunction.  White count continues to increase but patient is responding to diuresis.

## 2018-10-05 NOTE — PROGRESS NOTE ADULT - PROBLEM SELECTOR PLAN 2
IV lasix 60 mg x 2, continued on beta blocker per card recs  cardiology following  encourage nursing to reweigh patient when possible

## 2018-10-05 NOTE — PROGRESS NOTE ADULT - SUBJECTIVE AND OBJECTIVE BOX
Surgery PA Note:  POD#4  S/P Right Shoulder Hemiarthroplasty    Patient sitting in chair today looks tired and fatigued today but states that she is feeling better than   yesterday.      Vital Signs Last 24 Hrs  T(C): 36.7 (05 Oct 2018 11:45), Max: 36.7 (05 Oct 2018 11:45)  T(F): 98 (05 Oct 2018 11:45), Max: 98 (05 Oct 2018 11:45)  HR: 71 (05 Oct 2018 11:45) (61 - 71)  BP: 106/50 (05 Oct 2018 11:45) (105/72 - 148/46)  BP(mean): --  RR: 14 (05 Oct 2018 11:45) (14 - 16)  SpO2: 91% (05 Oct 2018 11:45) (91% - 97%)    PE:  Patient is alert but lethargic today states better than yesterday   Lungs:  BS decreased at bases   Cor:  S1S2  Abd: Surgery PA Note:  POD#4  S/P Right Shoulder Hemiarthroplasty    Patient sitting in chair today looks tired and fatigued today but states that she is feeling better than   yesterday.      Vital Signs Last 24 Hrs  T(C): 36.7 (05 Oct 2018 11:45), Max: 36.7 (05 Oct 2018 11:45)  T(F): 98 (05 Oct 2018 11:45), Max: 98 (05 Oct 2018 11:45)  HR: 71 (05 Oct 2018 11:45) (61 - 71)  BP: 106/50 (05 Oct 2018 11:45) (105/72 - 148/46)  BP(mean): --  RR: 14 (05 Oct 2018 11:45) (14 - 16)  SpO2: 91% (05 Oct 2018 11:45) (91% - 97%)    PE:  Patient is alert but lethargic today states better than yesterday   Lungs:  BS decreased at bases   Cor:  S1S2  Abd:  soft, +bs + bm  tolerating po intake            voiding   Right Arm in sling / right shoulder dressing C/D/I upper arm tense and tender to touch   Still has incisional and surgical pain 6/10 but is improving.   fingers warm less edematous good ROM + radial pulse   Ext :  without calf discomfort    10-04    139  |  103  |  30<H>  ----------------------------<  117<H>  4.6   |  28  |  1.14    Ca    9.3      04 Oct 2018 06:12                          9.1    16.6  )-----------( 189      ( 05 Oct 2018 06:35 )             29.1

## 2018-10-06 DIAGNOSIS — R10.84 GENERALIZED ABDOMINAL PAIN: ICD-10-CM

## 2018-10-06 LAB
ANION GAP SERPL CALC-SCNC: 5 MMOL/L — SIGNIFICANT CHANGE UP (ref 5–17)
ANISOCYTOSIS BLD QL: SLIGHT — SIGNIFICANT CHANGE UP
BASOPHILS # BLD AUTO: 0.1 K/UL — SIGNIFICANT CHANGE UP (ref 0–0.2)
BASOPHILS NFR BLD AUTO: 0.9 % — SIGNIFICANT CHANGE UP (ref 0–2)
BUN SERPL-MCNC: 27 MG/DL — HIGH (ref 7–23)
CALCIUM SERPL-MCNC: 8.6 MG/DL — SIGNIFICANT CHANGE UP (ref 8.4–10.5)
CHLORIDE SERPL-SCNC: 105 MMOL/L — SIGNIFICANT CHANGE UP (ref 96–108)
CO2 SERPL-SCNC: 27 MMOL/L — SIGNIFICANT CHANGE UP (ref 22–31)
CREAT SERPL-MCNC: 0.91 MG/DL — SIGNIFICANT CHANGE UP (ref 0.5–1.3)
EOSINOPHIL # BLD AUTO: 0.2 K/UL — SIGNIFICANT CHANGE UP (ref 0–0.5)
EOSINOPHIL NFR BLD AUTO: 1.4 % — SIGNIFICANT CHANGE UP (ref 0–6)
GLUCOSE SERPL-MCNC: 112 MG/DL — HIGH (ref 70–99)
HCT VFR BLD CALC: 27 % — LOW (ref 34.5–45)
HGB BLD-MCNC: 8.6 G/DL — LOW (ref 11.5–15.5)
LYMPHOCYTES # BLD AUTO: 1.5 K/UL — SIGNIFICANT CHANGE UP (ref 1–3.3)
LYMPHOCYTES # BLD AUTO: 11.3 % — LOW (ref 13–44)
MACROCYTES BLD QL: SLIGHT — SIGNIFICANT CHANGE UP
MAGNESIUM SERPL-MCNC: 1.2 MG/DL — LOW (ref 1.6–2.6)
MCHC RBC-ENTMCNC: 31.8 GM/DL — LOW (ref 32–36)
MCHC RBC-ENTMCNC: 33.6 PG — SIGNIFICANT CHANGE UP (ref 27–34)
MCV RBC AUTO: 105.9 FL — HIGH (ref 80–100)
MONOCYTES # BLD AUTO: 0.8 K/UL — SIGNIFICANT CHANGE UP (ref 0–0.9)
MONOCYTES NFR BLD AUTO: 6.2 % — SIGNIFICANT CHANGE UP (ref 1–9)
NEUTROPHILS # BLD AUTO: 10.6 K/UL — HIGH (ref 1.8–7.4)
NEUTROPHILS NFR BLD AUTO: 80.2 % — HIGH (ref 43–77)
PLAT MORPH BLD: NORMAL — SIGNIFICANT CHANGE UP
PLATELET # BLD AUTO: 264 K/UL — SIGNIFICANT CHANGE UP (ref 150–400)
POTASSIUM SERPL-MCNC: 4 MMOL/L — SIGNIFICANT CHANGE UP (ref 3.5–5.3)
POTASSIUM SERPL-SCNC: 4 MMOL/L — SIGNIFICANT CHANGE UP (ref 3.5–5.3)
RBC # BLD: 2.56 M/UL — LOW (ref 3.8–5.2)
RBC # FLD: 13.9 % — SIGNIFICANT CHANGE UP (ref 10.3–14.5)
RBC BLD AUTO: ABNORMAL
SODIUM SERPL-SCNC: 137 MMOL/L — SIGNIFICANT CHANGE UP (ref 135–145)
WBC # BLD: 13.3 K/UL — HIGH (ref 3.8–10.5)
WBC # FLD AUTO: 13.3 K/UL — HIGH (ref 3.8–10.5)

## 2018-10-06 PROCEDURE — 99232 SBSQ HOSP IP/OBS MODERATE 35: CPT

## 2018-10-06 PROCEDURE — 74018 RADEX ABDOMEN 1 VIEW: CPT | Mod: 26

## 2018-10-06 PROCEDURE — 99233 SBSQ HOSP IP/OBS HIGH 50: CPT

## 2018-10-06 PROCEDURE — 71045 X-RAY EXAM CHEST 1 VIEW: CPT | Mod: 26

## 2018-10-06 RX ORDER — ACETAMINOPHEN 500 MG
650 TABLET ORAL EVERY 4 HOURS
Qty: 0 | Refills: 0 | Status: DISCONTINUED | OUTPATIENT
Start: 2018-10-06 | End: 2018-10-08

## 2018-10-06 RX ORDER — MAGNESIUM SULFATE 500 MG/ML
2 VIAL (ML) INJECTION ONCE
Qty: 0 | Refills: 0 | Status: DISCONTINUED | OUTPATIENT
Start: 2018-10-06 | End: 2018-10-06

## 2018-10-06 RX ORDER — MAGNESIUM SULFATE 500 MG/ML
1 VIAL (ML) INJECTION
Qty: 0 | Refills: 0 | Status: DISCONTINUED | OUTPATIENT
Start: 2018-10-06 | End: 2018-10-06

## 2018-10-06 RX ORDER — PANTOPRAZOLE SODIUM 20 MG/1
40 TABLET, DELAYED RELEASE ORAL DAILY
Qty: 0 | Refills: 0 | Status: DISCONTINUED | OUTPATIENT
Start: 2018-10-06 | End: 2018-10-08

## 2018-10-06 RX ORDER — MAGNESIUM OXIDE 400 MG ORAL TABLET 241.3 MG
400 TABLET ORAL DAILY
Qty: 0 | Refills: 0 | Status: DISCONTINUED | OUTPATIENT
Start: 2018-10-06 | End: 2018-10-08

## 2018-10-06 RX ORDER — SENNA PLUS 8.6 MG/1
2 TABLET ORAL AT BEDTIME
Qty: 0 | Refills: 0 | Status: DISCONTINUED | OUTPATIENT
Start: 2018-10-06 | End: 2018-10-08

## 2018-10-06 RX ORDER — POLYETHYLENE GLYCOL 3350 17 G/17G
17 POWDER, FOR SOLUTION ORAL DAILY
Qty: 0 | Refills: 0 | Status: DISCONTINUED | OUTPATIENT
Start: 2018-10-06 | End: 2018-10-08

## 2018-10-06 RX ORDER — FUROSEMIDE 40 MG
60 TABLET ORAL ONCE
Qty: 0 | Refills: 0 | Status: COMPLETED | OUTPATIENT
Start: 2018-10-06 | End: 2018-10-06

## 2018-10-06 RX ADMIN — Medication 100 GRAM(S): at 16:56

## 2018-10-06 RX ADMIN — ENOXAPARIN SODIUM 40 MILLIGRAM(S): 100 INJECTION SUBCUTANEOUS at 12:02

## 2018-10-06 RX ADMIN — POLYETHYLENE GLYCOL 3350 17 GRAM(S): 17 POWDER, FOR SOLUTION ORAL at 17:04

## 2018-10-06 RX ADMIN — OXYCODONE HYDROCHLORIDE 5 MILLIGRAM(S): 5 TABLET ORAL at 12:03

## 2018-10-06 RX ADMIN — PANTOPRAZOLE SODIUM 40 MILLIGRAM(S): 20 TABLET, DELAYED RELEASE ORAL at 06:04

## 2018-10-06 RX ADMIN — OXYCODONE HYDROCHLORIDE 10 MILLIGRAM(S): 5 TABLET ORAL at 13:00

## 2018-10-06 RX ADMIN — Medication 60 MILLIGRAM(S): at 17:04

## 2018-10-06 RX ADMIN — OXYCODONE HYDROCHLORIDE 10 MILLIGRAM(S): 5 TABLET ORAL at 23:43

## 2018-10-06 RX ADMIN — LOSARTAN POTASSIUM 50 MILLIGRAM(S): 100 TABLET, FILM COATED ORAL at 06:04

## 2018-10-06 RX ADMIN — Medication 100 MILLIGRAM(S): at 06:04

## 2018-10-06 RX ADMIN — PANTOPRAZOLE SODIUM 40 MILLIGRAM(S): 20 TABLET, DELAYED RELEASE ORAL at 12:05

## 2018-10-06 RX ADMIN — BUDESONIDE AND FORMOTEROL FUMARATE DIHYDRATE 2 PUFF(S): 160; 4.5 AEROSOL RESPIRATORY (INHALATION) at 21:36

## 2018-10-06 RX ADMIN — Medication 60 MILLIGRAM(S): at 06:05

## 2018-10-06 RX ADMIN — ATORVASTATIN CALCIUM 20 MILLIGRAM(S): 80 TABLET, FILM COATED ORAL at 21:09

## 2018-10-06 RX ADMIN — Medication 975 MILLIGRAM(S): at 07:17

## 2018-10-06 RX ADMIN — Medication 975 MILLIGRAM(S): at 06:16

## 2018-10-06 NOTE — PROGRESS NOTE ADULT - ASSESSMENT
surgically stable   ^WBC   CHF  Abdominal discomfort secondary to constipation  Anemia of acute blood loss     Plan:  Follow Shoulder Protocol sling for first two weeks with gentle exercise           Lovenox for DVT prophylaxis            Laxatives as needed            OOB as tolerated            As per medicine and cardio

## 2018-10-06 NOTE — PROGRESS NOTE ADULT - PROBLEM SELECTOR PLAN 3
likely secondary to above (acute on chronic diastolic heart failure)  CBC ordered for the morning  will continue to evaluate likely secondary to above (acute on chronic diastolic heart failure)  will continue to follow

## 2018-10-06 NOTE — OCCUPATIONAL THERAPY INITIAL EVALUATION ADULT - PERTINENT HX OF CURRENT PROBLEM, REHAB EVAL
EMR Issues Present, Pertinent information as follows: Sit to Stand + RW with minimum assist; Stand to Sit + RW with minimum assist; OT discharge recommendations for JOSE, 2-3x/week during acute care stay as time permits, OT interventions focused on right UE rehabilitation protocol, transfers, ADLs.

## 2018-10-06 NOTE — OCCUPATIONAL THERAPY INITIAL EVALUATION ADULT - PRECAUTIONS/LIMITATIONS, REHAB EVAL
followed orthopedic post surgical rehab guidelines, see orthopedic note for details/oxygen therapy device and L/min/fall precautions/surgical precautions

## 2018-10-06 NOTE — PROGRESS NOTE ADULT - SUBJECTIVE AND OBJECTIVE BOX
Surgery PA Note:  POD#5    S/P Right Shoulder hemiarthroplasty     Patient is sitting up in a chair still c/o pain in right  shoulder surgical discomfort 6/10 .  Today patient is c/o pain in the abdomen mostly   LLQ.  Patient denies BM this am .    Vital Signs Last 24 Hrs  T(C): 36.4 (06 Oct 2018 10:50), Max: 36.8 (05 Oct 2018 15:40)  T(F): 97.6 (06 Oct 2018 10:50), Max: 98.3 (05 Oct 2018 15:40)  HR: 59 (06 Oct 2018 10:50) (59 - 75)  BP: 122/58 (06 Oct 2018 10:50) (113/47 - 134/65)  BP(mean): --  RR: 14 (06 Oct 2018 10:50) (14 - 14)  SpO2: 92% (06 Oct 2018 10:50) (92% - 97%)    PE:    Patient A/O X 2 c/o new issue this am abdominal discomfort  Lungs:   scattered wheezes/rhonchi   Right shoulder dressing c/d/i  sling in place fingers warm good rom with hand + radial pulse   upper arm still edematous and ecchymotic tender to touch   Cor :  S1 S2 - murmur   Abd:  softly distended +BS + tenderness LLQ patient states +Flatus - hernias            Examined patient with Dr Vilchis +BM (just informed by nurse)   Ext:  w/o edema, w/o calf tenderness                           8.6    13.3  )-----------( 264      ( 06 Oct 2018 08:00 )             27.0     10-06    137  |  105  |  27<H>  ----------------------------<  112<H>  4.0   |  27  |  0.91    Ca    8.6      06 Oct 2018 08:00  Mg     1.2     10-06 Surgery PA Note:  POD#5    S/P Right Shoulder hemiarthroplasty     Patient is sitting up in a chair still c/o pain in right  shoulder surgical discomfort 6/10 .  Today patient is c/o pain in the abdomen mostly   LLQ.  Patient denies BM this am .    Vital Signs Last 24 Hrs  T(C): 36.4 (06 Oct 2018 10:50), Max: 36.8 (05 Oct 2018 15:40)  T(F): 97.6 (06 Oct 2018 10:50), Max: 98.3 (05 Oct 2018 15:40)  HR: 59 (06 Oct 2018 10:50) (59 - 75)  BP: 122/58 (06 Oct 2018 10:50) (113/47 - 134/65)  BP(mean): --  RR: 14 (06 Oct 2018 10:50) (14 - 14)  SpO2: 92% (06 Oct 2018 10:50) (92% - 97%)    PE:    Patient A/O X 2 c/o new issue this am abdominal discomfort  Lungs:   scattered wheezes/rhonchi   Right shoulder dressing c/d/i  sling in place fingers warm good rom with hand + radial pulse +  strength   upper arm still edematous and ecchymotic tender to touch   Cor :  S1 S2 - murmur   Abd:  softly distended +BS + tenderness LLQ patient states +Flatus - hernias w/o nausea w/o vomiting            Examined patient with Dr Vilchis +BM (just informed by nurse)   Ext:  w/o edema, w/o calf tenderness                           8.6    13.3  )-----------( 264      ( 06 Oct 2018 08:00 )             27.0     10-06    137  |  105  |  27<H>  ----------------------------<  112<H>  4.0   |  27  |  0.91    Ca    8.6      06 Oct 2018 08:00  Mg     1.2     10-06

## 2018-10-06 NOTE — OCCUPATIONAL THERAPY INITIAL EVALUATION ADULT - EDEMA, REHAB EVAL
+ right hand edema, pt educated on digit grasp and release as well as neutral forearm and wrist position in sling, provided demonstration and pt able to return demonstration

## 2018-10-06 NOTE — PROGRESS NOTE ADULT - PROBLEM SELECTOR PLAN 2
IV lasix 60 mg x 2, continued on beta blocker per card recs  cardiology following  encourage nursing to reweigh patient when possible IV lasix 60 mg x 2, continued on beta blocker per cardio.

## 2018-10-06 NOTE — PROGRESS NOTE ADULT - ASSESSMENT
73 yr old s/p fall, alcoholism, s/p shoulder venu arthroplasty.  Has a history of essential htn, dyslipidemia,CAD, s/p  MI, s/p CABG.  Mid-moderate LV dysfunction.  White count continues to increase but patient is responding to diuresis.

## 2018-10-06 NOTE — PROGRESS NOTE ADULT - ASSESSMENT
s/p fall/shoulder fracture. diastolic CHF, ashd/old CABG.  abdominal pain, dyspnea.    To have radiographs abdomen and chest, then reassess.    continue b blocker for VT/BP/CAD    continue tele

## 2018-10-06 NOTE — PROGRESS NOTE ADULT - PROBLEM SELECTOR PLAN 1
s/p right venu arthroplasty pod #3 ct pain management awaiting JOSE once medically stable s/p right venu arthroplasty pod #5 ct pain management awaiting JOSE once medically stable

## 2018-10-06 NOTE — CONSULT NOTE ADULT - ASSESSMENT
a/p  no evidence of hernia  no intestinal obstruction  likely constipation    may start clear liquid diet plus laxatives    thank you

## 2018-10-06 NOTE — PROGRESS NOTE ADULT - SUBJECTIVE AND OBJECTIVE BOX
Patient is a 73y old  Female who presents with a chief complaint of fall off toilet.   SR last pm HR 66-77        Patient seen and examined at bedside.    ALLERGIES:  dust (Unknown)  latex (Unknown)  Originally Entered as [Unknown] reaction to [CATHAIR] (Unknown)  Originally Entered as [Unknown] reaction to [MILDEW] (Unknown)  Zosyn (Unknown)    MEDICATIONS  (STANDING):  acetaminophen   Tablet .. 975 milliGRAM(s) Oral every 8 hours  atorvastatin 20 milliGRAM(s) Oral at bedtime  buDESOnide  80 MICROgram(s)/formoterol 4.5 MICROgram(s) Inhaler 2 Puff(s) Inhalation two times a day  docusate sodium 100 milliGRAM(s) Oral three times a day  enoxaparin Injectable 40 milliGRAM(s) SubCutaneous every 24 hours  furosemide   Injectable 60 milliGRAM(s) IV Push two times a day  losartan 50 milliGRAM(s) Oral daily  metoprolol succinate  milliGRAM(s) Oral daily  pantoprazole    Tablet 40 milliGRAM(s) Oral before breakfast    MEDICATIONS  (PRN):  bisacodyl Suppository 10 milliGRAM(s) Rectal daily PRN If no bowel movement by POD # 2  glucagon  Injectable 1 milliGRAM(s) IntraMuscular once PRN Glucose LESS THAN 70 milligrams/deciliter  HYDROmorphone  Injectable 0.5 milliGRAM(s) IV Push every 3 hours PRN Severe Pain (7 - 10)  magnesium hydroxide Suspension 30 milliLiter(s) Oral daily PRN Constipation  ondansetron Injectable 4 milliGRAM(s) IV Push every 6 hours PRN Nausea and/or Vomiting  oxyCODONE    IR 10 milliGRAM(s) Oral every 4 hours PRN Moderate Pain (4 - 6)  oxyCODONE    IR 5 milliGRAM(s) Oral every 4 hours PRN Mild Pain (1 - 3)    Vital Signs Last 24 Hrs  T(F): 97.8 (06 Oct 2018 05:38), Max: 98.3 (05 Oct 2018 15:40)  HR: 68 (06 Oct 2018 05:38) (68 - 75)  BP: 134/65 (06 Oct 2018 05:38) (105/72 - 134/65)  RR: 14 (06 Oct 2018 05:38) (14 - 14)  SpO2: 97% (06 Oct 2018 05:38) (91% - 97%)  I&O's Summary    05 Oct 2018 07:01  -  06 Oct 2018 07:00  --------------------------------------------------------  IN: 500 mL / OUT: 0 mL / NET: 500 mL      PHYSICAL EXAM:  General: NAD, A/O x 3  ENT: MMM  Neck: Supple, No JVD  Lungs: Clear to auscultation bilaterally  Cardio: RRR, S1/S2, No murmurs  Abdomen: Soft, Nontender, Nondistended; Bowel sounds present  Extremities: No calf tenderness, No pitting edema    LABS:                        9.1    16.6  )-----------( 189      ( 05 Oct 2018 06:35 )             29.1     10-04    139  |  103  |  30  ----------------------------<  117  4.6   |  28  |  1.14    Ca    9.3      04 Oct 2018 06:12      eGFR if Non African American: 48 mL/min/1.73M2 (10-04-18 @ 06:12)  eGFR if African American: 55 mL/min/1.73M2 (10-04-18 @ 06:12)                    CAPILLARY BLOOD GLUCOSE        09-30 [id number] 5.7        RADIOLOGY & ADDITIONAL TESTS:    Care Discussed with Consultants/Other Providers:     EXAM:  XR CHEST PORTABLE IMMED 1V      PROCEDURE DATE:  10/04/2018        INTERPRETATION:  CLINICAL INFORMATION: Shortness of breath.    TECHNIQUE: Single view of the chest.    COMPARISON: Chest radiograph 9/29/2018.    FINDINGS:     The patient is status post median sternotomy and CABG. Surgical sutures   are seen within the right upper lung. There is been interval placement of   a right shoulder hemiarthroplasty. There is increasing opacity in the   right lower lung with a small right pleural effusion. There is no   pneumothorax. The cardiac silhouette is enlarged as on prior exam.    IMPRESSION:     Small right pleural effusion and increasing opacity in the right lower   lung, likely atelectasis. Patient is a 73y old  Female who presents with a chief complaint of fall off toilet.   SR last pm HR 66-77  complaint of pain always  non specific    Patient seen and examined at bedside.    ALLERGIES:  dust (Unknown)  latex (Unknown)  Originally Entered as [Unknown] reaction to [CATHAIR] (Unknown)  Originally Entered as [Unknown] reaction to [MILDEW] (Unknown)  Zosyn (Unknown)    MEDICATIONS  (STANDING):  acetaminophen   Tablet .. 975 milliGRAM(s) Oral every 8 hours  atorvastatin 20 milliGRAM(s) Oral at bedtime  buDESOnide  80 MICROgram(s)/formoterol 4.5 MICROgram(s) Inhaler 2 Puff(s) Inhalation two times a day  docusate sodium 100 milliGRAM(s) Oral three times a day  enoxaparin Injectable 40 milliGRAM(s) SubCutaneous every 24 hours  furosemide   Injectable 60 milliGRAM(s) IV Push two times a day  losartan 50 milliGRAM(s) Oral daily  metoprolol succinate  milliGRAM(s) Oral daily  pantoprazole    Tablet 40 milliGRAM(s) Oral before breakfast    MEDICATIONS  (PRN):  bisacodyl Suppository 10 milliGRAM(s) Rectal daily PRN If no bowel movement by POD # 2  glucagon  Injectable 1 milliGRAM(s) IntraMuscular once PRN Glucose LESS THAN 70 milligrams/deciliter  HYDROmorphone  Injectable 0.5 milliGRAM(s) IV Push every 3 hours PRN Severe Pain (7 - 10)  magnesium hydroxide Suspension 30 milliLiter(s) Oral daily PRN Constipation  ondansetron Injectable 4 milliGRAM(s) IV Push every 6 hours PRN Nausea and/or Vomiting  oxyCODONE    IR 10 milliGRAM(s) Oral every 4 hours PRN Moderate Pain (4 - 6)  oxyCODONE    IR 5 milliGRAM(s) Oral every 4 hours PRN Mild Pain (1 - 3)    Vital Signs Last 24 Hrs  T(F): 97.8 (06 Oct 2018 05:38), Max: 98.3 (05 Oct 2018 15:40)  HR: 68 (06 Oct 2018 05:38) (68 - 75)  BP: 134/65 (06 Oct 2018 05:38) (105/72 - 134/65)  RR: 14 (06 Oct 2018 05:38) (14 - 14)  SpO2: 97% (06 Oct 2018 05:38) (91% - 97%)  I&O's Summary    05 Oct 2018 07:01  -  06 Oct 2018 07:00  --------------------------------------------------------  IN: 500 mL / OUT: 0 mL / NET: 500 mL      PHYSICAL EXAM:  General: NAD, A/O x 2  ENT: MMM  Neck: Supple, No JVD  Lungs: Clear to auscultation bilaterally  Cardio: RRR, S1/S2, No murmurs  Abdomen: ,tender over entire abdomen, distended; Bowel sounds present  Extremities: No calf tenderness, No pitting edema    LABS:                        9.1    16.6  )-----------( 189      ( 05 Oct 2018 06:35 )             29.1     10-04    139  |  103  |  30  ----------------------------<  117  4.6   |  28  |  1.14    Ca    9.3      04 Oct 2018 06:12      eGFR if Non African American: 48 mL/min/1.73M2 (10-04-18 @ 06:12)  eGFR if African American: 55 mL/min/1.73M2 (10-04-18 @ 06:12)                    CAPILLARY BLOOD GLUCOSE        09-30 [id number] 5.7        RADIOLOGY & ADDITIONAL TESTS:    Care Discussed with Consultants/Other Providers:     EXAM:  XR CHEST PORTABLE IMMED 1V      PROCEDURE DATE:  10/04/2018        INTERPRETATION:  CLINICAL INFORMATION: Shortness of breath.    TECHNIQUE: Single view of the chest.    COMPARISON: Chest radiograph 9/29/2018.    FINDINGS:     The patient is status post median sternotomy and CABG. Surgical sutures   are seen within the right upper lung. There is been interval placement of   a right shoulder hemiarthroplasty. There is increasing opacity in the   right lower lung with a small right pleural effusion. There is no   pneumothorax. The cardiac silhouette is enlarged as on prior exam.    IMPRESSION:     Small right pleural effusion and increasing opacity in the right lower   lung, likely atelectasis.

## 2018-10-06 NOTE — OCCUPATIONAL THERAPY INITIAL EVALUATION ADULT - RANGE OF MOTION EXAMINATION, UPPER EXTREMITY
Left UE Active ROM was WFL (within functional limits) Left UE Active ROM was WFL (within functional limits)/Right UE Digits Active ROM: WFL

## 2018-10-06 NOTE — PROGRESS NOTE ADULT - PROBLEM SELECTOR PLAN 10
Xray of the abdomen   PVR   labs for this am pending Xray of the abdomen shows bowel distention and possible ileus , Dr Vilchis Surgical consult called  PVR   labs for this am pending

## 2018-10-06 NOTE — PROGRESS NOTE ADULT - SUBJECTIVE AND OBJECTIVE BOX
Follow up for  fall, cad, CHF    SUBJ:   complains of abdominal pain, also dyspnea.    PMH  Alcohol abuse  HTN (hypertension)  Asthma  DM (diabetes mellitus)      MEDICATIONS  (STANDING):  acetaminophen   Tablet .. 975 milliGRAM(s) Oral every 8 hours  atorvastatin 20 milliGRAM(s) Oral at bedtime  buDESOnide  80 MICROgram(s)/formoterol 4.5 MICROgram(s) Inhaler 2 Puff(s) Inhalation two times a day  docusate sodium 100 milliGRAM(s) Oral three times a day  enoxaparin Injectable 40 milliGRAM(s) SubCutaneous every 24 hours  furosemide   Injectable 60 milliGRAM(s) IV Push two times a day  losartan 50 milliGRAM(s) Oral daily  metoprolol succinate  milliGRAM(s) Oral daily  pantoprazole    Tablet 40 milliGRAM(s) Oral before breakfast    MEDICATIONS  (PRN):  bisacodyl Suppository 10 milliGRAM(s) Rectal daily PRN If no bowel movement by POD # 2  glucagon  Injectable 1 milliGRAM(s) IntraMuscular once PRN Glucose LESS THAN 70 milligrams/deciliter  HYDROmorphone  Injectable 0.5 milliGRAM(s) IV Push every 3 hours PRN Severe Pain (7 - 10)  magnesium hydroxide Suspension 30 milliLiter(s) Oral daily PRN Constipation  ondansetron Injectable 4 milliGRAM(s) IV Push every 6 hours PRN Nausea and/or Vomiting  oxyCODONE    IR 10 milliGRAM(s) Oral every 4 hours PRN Moderate Pain (4 - 6)  oxyCODONE    IR 5 milliGRAM(s) Oral every 4 hours PRN Mild Pain (1 - 3)        PHYSICAL EXAM:  Vital Signs Last 24 Hrs  T(C): 36.6 (06 Oct 2018 05:38), Max: 36.8 (05 Oct 2018 15:40)  T(F): 97.8 (06 Oct 2018 05:38), Max: 98.3 (05 Oct 2018 15:40)  HR: 68 (06 Oct 2018 05:38) (68 - 75)  BP: 134/65 (06 Oct 2018 05:38) (106/50 - 134/65)  BP(mean): --  RR: 14 (06 Oct 2018 05:38) (14 - 14)  SpO2: 97% (06 Oct 2018 05:38) (91% - 97%)    GENERAL: NAD, well-groomed, well-developed  Lying flat  HEAD:  Atraumatic, Normocephalic  EYES: EOMI, PERRLA, conjunctiva and sclera clear  ENT: Moist mucous membranes,  NECK: Supple, No JVD, no bruits  CHEST/LUNG: bilateral rhonchi/wheezes  HEART: Regular rate and rhythm; No murmurs, rubs, or gallops PMI non displaced.  ABDOMEN: Diffusely tender  EXTREMITIES:  No clubbing, cyanosis, or edema  NERVOUS SYSTEM:  Alert      TELEMETRY:  sinus      LABS:                        8.6    13.3  )-----------( 264      ( 06 Oct 2018 08:00 )             27.0     10-06    137  |  105  |  27<H>  ----------------------------<  112<H>  4.0   |  27  |  0.91    Ca    8.6      06 Oct 2018 08:00  Mg     1.2     10-06      I&O's Summary    05 Oct 2018 07:01  -  06 Oct 2018 07:00  --------------------------------------------------------  IN: 500 mL / OUT: 0 mL / NET: 500 mL
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
## 2018-10-07 LAB
ALBUMIN SERPL ELPH-MCNC: 2.2 G/DL — LOW (ref 3.3–5)
ALP SERPL-CCNC: 61 U/L — SIGNIFICANT CHANGE UP (ref 40–120)
ALT FLD-CCNC: 9 U/L DA — LOW (ref 10–45)
ANION GAP SERPL CALC-SCNC: 7 MMOL/L — SIGNIFICANT CHANGE UP (ref 5–17)
AST SERPL-CCNC: 15 U/L — SIGNIFICANT CHANGE UP (ref 10–40)
BILIRUB SERPL-MCNC: 0.6 MG/DL — SIGNIFICANT CHANGE UP (ref 0.2–1.2)
BUN SERPL-MCNC: 18 MG/DL — SIGNIFICANT CHANGE UP (ref 7–23)
CALCIUM SERPL-MCNC: 8.2 MG/DL — LOW (ref 8.4–10.5)
CHLORIDE SERPL-SCNC: 103 MMOL/L — SIGNIFICANT CHANGE UP (ref 96–108)
CO2 SERPL-SCNC: 29 MMOL/L — SIGNIFICANT CHANGE UP (ref 22–31)
CREAT SERPL-MCNC: 0.91 MG/DL — SIGNIFICANT CHANGE UP (ref 0.5–1.3)
GLUCOSE SERPL-MCNC: 109 MG/DL — HIGH (ref 70–99)
HCT VFR BLD CALC: 26.9 % — LOW (ref 34.5–45)
HGB BLD-MCNC: 9 G/DL — LOW (ref 11.5–15.5)
MCHC RBC-ENTMCNC: 33.3 GM/DL — SIGNIFICANT CHANGE UP (ref 32–36)
MCHC RBC-ENTMCNC: 34.2 PG — HIGH (ref 27–34)
MCV RBC AUTO: 102.6 FL — HIGH (ref 80–100)
PLATELET # BLD AUTO: 316 K/UL — SIGNIFICANT CHANGE UP (ref 150–400)
POTASSIUM SERPL-MCNC: 4 MMOL/L — SIGNIFICANT CHANGE UP (ref 3.5–5.3)
POTASSIUM SERPL-SCNC: 4 MMOL/L — SIGNIFICANT CHANGE UP (ref 3.5–5.3)
PROT SERPL-MCNC: 5.8 G/DL — LOW (ref 6–8.3)
RBC # BLD: 2.62 M/UL — LOW (ref 3.8–5.2)
RBC # FLD: 13.2 % — SIGNIFICANT CHANGE UP (ref 10.3–14.5)
SODIUM SERPL-SCNC: 139 MMOL/L — SIGNIFICANT CHANGE UP (ref 135–145)
WBC # BLD: 10.1 K/UL — SIGNIFICANT CHANGE UP (ref 3.8–10.5)
WBC # FLD AUTO: 10.1 K/UL — SIGNIFICANT CHANGE UP (ref 3.8–10.5)

## 2018-10-07 PROCEDURE — 74176 CT ABD & PELVIS W/O CONTRAST: CPT | Mod: 26

## 2018-10-07 PROCEDURE — 99233 SBSQ HOSP IP/OBS HIGH 50: CPT | Mod: GC

## 2018-10-07 RX ADMIN — BUDESONIDE AND FORMOTEROL FUMARATE DIHYDRATE 2 PUFF(S): 160; 4.5 AEROSOL RESPIRATORY (INHALATION) at 20:23

## 2018-10-07 RX ADMIN — MAGNESIUM OXIDE 400 MG ORAL TABLET 400 MILLIGRAM(S): 241.3 TABLET ORAL at 12:12

## 2018-10-07 RX ADMIN — HYDROMORPHONE HYDROCHLORIDE 0.5 MILLIGRAM(S): 2 INJECTION INTRAMUSCULAR; INTRAVENOUS; SUBCUTANEOUS at 18:07

## 2018-10-07 RX ADMIN — Medication 100 MILLIGRAM(S): at 06:12

## 2018-10-07 RX ADMIN — Medication 60 MILLIGRAM(S): at 06:11

## 2018-10-07 RX ADMIN — HYDROMORPHONE HYDROCHLORIDE 0.5 MILLIGRAM(S): 2 INJECTION INTRAMUSCULAR; INTRAVENOUS; SUBCUTANEOUS at 13:30

## 2018-10-07 RX ADMIN — PANTOPRAZOLE SODIUM 40 MILLIGRAM(S): 20 TABLET, DELAYED RELEASE ORAL at 12:12

## 2018-10-07 RX ADMIN — HYDROMORPHONE HYDROCHLORIDE 0.5 MILLIGRAM(S): 2 INJECTION INTRAMUSCULAR; INTRAVENOUS; SUBCUTANEOUS at 17:52

## 2018-10-07 RX ADMIN — POLYETHYLENE GLYCOL 3350 17 GRAM(S): 17 POWDER, FOR SOLUTION ORAL at 12:14

## 2018-10-07 RX ADMIN — HYDROMORPHONE HYDROCHLORIDE 0.5 MILLIGRAM(S): 2 INJECTION INTRAMUSCULAR; INTRAVENOUS; SUBCUTANEOUS at 21:54

## 2018-10-07 RX ADMIN — OXYCODONE HYDROCHLORIDE 10 MILLIGRAM(S): 5 TABLET ORAL at 00:30

## 2018-10-07 RX ADMIN — BUDESONIDE AND FORMOTEROL FUMARATE DIHYDRATE 2 PUFF(S): 160; 4.5 AEROSOL RESPIRATORY (INHALATION) at 09:09

## 2018-10-07 RX ADMIN — ENOXAPARIN SODIUM 40 MILLIGRAM(S): 100 INJECTION SUBCUTANEOUS at 12:12

## 2018-10-07 RX ADMIN — OXYCODONE HYDROCHLORIDE 5 MILLIGRAM(S): 5 TABLET ORAL at 16:29

## 2018-10-07 RX ADMIN — Medication 60 MILLIGRAM(S): at 18:30

## 2018-10-07 RX ADMIN — LOSARTAN POTASSIUM 50 MILLIGRAM(S): 100 TABLET, FILM COATED ORAL at 06:12

## 2018-10-07 RX ADMIN — HYDROMORPHONE HYDROCHLORIDE 0.5 MILLIGRAM(S): 2 INJECTION INTRAMUSCULAR; INTRAVENOUS; SUBCUTANEOUS at 22:09

## 2018-10-07 RX ADMIN — OXYCODONE HYDROCHLORIDE 5 MILLIGRAM(S): 5 TABLET ORAL at 15:29

## 2018-10-07 RX ADMIN — HYDROMORPHONE HYDROCHLORIDE 0.5 MILLIGRAM(S): 2 INJECTION INTRAMUSCULAR; INTRAVENOUS; SUBCUTANEOUS at 13:15

## 2018-10-07 NOTE — PROGRESS NOTE ADULT - SUBJECTIVE AND OBJECTIVE BOX
Ortho / Surgery PA Addendum    CT Scan Abdomen today with Radiology reading Diverticulosis, No Diverticulitis.   Rounded on patient with Gen surgery.   Will advance diet to full liquids.  Surgeon requested Vascular Surgery - NIRMAL Burch MD consult to evaluate lower Aorta findings on CT Abdomen

## 2018-10-07 NOTE — PROGRESS NOTE ADULT - SUBJECTIVE AND OBJECTIVE BOX
Patient is a 73y old  Female who presents with a chief complaint of fall off toilet.  pt notes abd pain better today.   Pt had Lg bm lat evening.    Patient seen and examined at bedside.    ALLERGIES:  dust (Unknown)  latex (Unknown)  Originally Entered as [Unknown] reaction to [CATHAIR] (Unknown)  Originally Entered as [Unknown] reaction to [MILDEW] (Unknown)  Zosyn (Unknown)    MEDICATIONS  (STANDING):  atorvastatin 20 milliGRAM(s) Oral at bedtime  buDESOnide  80 MICROgram(s)/formoterol 4.5 MICROgram(s) Inhaler 2 Puff(s) Inhalation two times a day  enoxaparin Injectable 40 milliGRAM(s) SubCutaneous every 24 hours  furosemide   Injectable 60 milliGRAM(s) IV Push two times a day  losartan 50 milliGRAM(s) Oral daily  magnesium oxide 400 milliGRAM(s) Oral daily  metoprolol succinate  milliGRAM(s) Oral daily  pantoprazole  Injectable 40 milliGRAM(s) IV Push daily  polyethylene glycol 3350 17 Gram(s) Oral daily    MEDICATIONS  (PRN):  acetaminophen  Suppository .. 650 milliGRAM(s) Rectal every 4 hours PRN Temp greater or equal to 38C (100.4F), Moderate Pain (4 - 6)  glucagon  Injectable 1 milliGRAM(s) IntraMuscular once PRN Glucose LESS THAN 70 milligrams/deciliter  HYDROmorphone  Injectable 0.5 milliGRAM(s) IV Push every 3 hours PRN Severe Pain (7 - 10)  ondansetron Injectable 4 milliGRAM(s) IV Push every 6 hours PRN Nausea and/or Vomiting  oxyCODONE    IR 10 milliGRAM(s) Oral every 4 hours PRN Moderate Pain (4 - 6)  oxyCODONE    IR 5 milliGRAM(s) Oral every 4 hours PRN Mild Pain (1 - 3)  senna 2 Tablet(s) Oral at bedtime PRN Constipation    Vital Signs Last 24 Hrs  T(F): 97.9 (07 Oct 2018 04:53), Max: 97.9 (07 Oct 2018 04:53)  HR: 74 (07 Oct 2018 04:53) (59 - 74)  BP: 112/65 (07 Oct 2018 04:53) (112/65 - 142/59)  RR: 15 (07 Oct 2018 04:53) (14 - 15)  SpO2: 90% (07 Oct 2018 04:53) (88% - 92%)  I&O's Summary    PHYSICAL EXAM:  General: NAD, A/O x 3  ENT: MMM  Neck: Supple, No JVD  Lungs: Clear to auscultation bilaterally  Cardio: RRR, S1/S2, No murmurs  Abdomen: Soft,  markedly tender LLQ ,  RLQ with mild tenderness Nondistended; Bowel sounds present  Extremities: No calf tenderness, No pitting edema                    right arm in sling s/p ORIF    LABS:                        9.0    10.1  )-----------( 316      ( 07 Oct 2018 05:13 )             26.9     10-07    139  |  103  |  18  ----------------------------<  109  4.0   |  29  |  0.91    Ca    8.2      07 Oct 2018 05:13  Mg     1.2     10-06    TPro  5.8  /  Alb  2.2  /  TBili  0.6  /  DBili  x   /  AST  15  /  ALT  9   /  AlkPhos  61  10-07    eGFR if Non African American: 63 mL/min/1.73M2 (10-07-18 @ 05:13)  eGFR if African American: 73 mL/min/1.73M2 (10-07-18 @ 05:13)                    CAPILLARY BLOOD GLUCOSE        09-30 [id number] 5.7        RADIOLOGY & ADDITIONAL TESTS:    Care Discussed with Consultants/Other Providers:

## 2018-10-07 NOTE — PROGRESS NOTE ADULT - ATTENDING COMMENTS
cad abnormal ekg  hemodynacially stable contine current cardia care.
ashd  clinically stable continue current care, no new cardiac recommendations.
I have personally seen and examined patient on the above date.  I discussed the case with PA and I agree with findings and plan as detailed per note above, which I have amended where appropriate.    72 yo f pmh cad s/p cabg, essential htn (off meds), dyslipidemia, etoh abuse pw fall with right humeral fracture sp right venu arthroplasty pod# 6  now has an ileus  seen by surgery clear liquids and laxatives recommended hypomagnesemia replete . Patient still having significant abdominal pain left sided + BM will fu surgery and ck ct abd/pel
I have personally seen and examined patient on the above date.  I discussed the case with PA and I agree with findings and plan as detailed per note above, which I have amended where appropriate.    72 yo f pmh cad s/p cabg, essential htn (off meds), dyslipidemia, etoh abuse pw fall with right humeral fracture sp right venu arthroplasty pod# 5  now has an ileus  seen by surgery clear liquids and laxatives recommended hypomagnesemia replete

## 2018-10-07 NOTE — PROGRESS NOTE ADULT - ASSESSMENT
73 yr old s/p fall, alcoholism, s/p right shoulder venu arthroplasty.  Has a history of essential htn, dyslipidemia, CAD, s/p  MI, s/p CABG.  Mid-moderate LV dysfunction.  pt labs appear better.  LLQ tenderness persist-- non contrast CT abd/pelvis today

## 2018-10-07 NOTE — PROGRESS NOTE ADULT - SUBJECTIVE AND OBJECTIVE BOX
POD#: 6    S: Pt without complaints. No SOB,CP, N/V. Tolerated Fluids well.   Pain right shoulder comfortable (4/10 ) on Interval Rx. w/o motion. Pain 6/10 with passive mov.  Had BM yet early this AM & yest 12N, + flatus, Has mild Left LLQ abdominal pain, crampy in nature.  Just had CT abdomen, ordered by Medicine for LLQ tenderness on exam.  Pain Rx  acetaminophen  Suppository .. 650 milliGRAM(s) Rectal every 4 hours PRN  HYDROmorphone  Injectable 0.5 milliGRAM(s) IV Push every 3 hours PRN  ondansetron Injectable 4 milliGRAM(s) IV Push every 6 hours PRN  oxyCODONE    IR 10 milliGRAM(s) Oral every 4 hours PRN  oxyCODONE    IR 5 milliGRAM(s) Oral every 4 hours PRN      O: General: Pt Alert and oriented x 3, On exam NAD, resting comfortably in bed.  VS: Vital Signs Last 24 Hrs  T(C): 36.5 (07 Oct 2018 09:02), Max: 36.6 (06 Oct 2018 21:00)  T(F): 97.7 (07 Oct 2018 09:02), Max: 97.9 (07 Oct 2018 04:53)  HR: 72 (07 Oct 2018 09:02) (66 - 74)  BP: 153/65 (07 Oct 2018 09:02) (112/65 - 153/65)  RR: 15 (07 Oct 2018 09:02) (15 - 15)  SpO2: 95% (07 Oct 2018 09:10) (88% - 95%)    Lungs: BS clear bilat.  [Abdomen]: Soft; + BS; no distention, min LLQ tenderness, no guarding or rigidity  Ext( Right  Shoulder): anterior Incision] clean, dry, & intact ; Nylon sutures intact; no  dehiscence;  Min. STS shoulder, improved since Post-Op; No  cellulitis;  Compartments soft and NT in forearm Bilat. Right Hand without deformity or specific tenderness. 1+ dependent edema, improved  Vasc: Hand / Fingers, pink. Good capillary refill; Rad = 2+. Calves soft ; w/o tenderness bilat..  Neuro: Has sensation bilat. A & P Hands / Fingers ; 3-4/5  strength right, hesitates due to swelling & mild discomfort with hand motion .  Full AROM bilat  wrist. EPL  = Left: 5/5 ; right 4/5 due to pain with finger motion.    VTEP: On Bilat. Venodynes + enoxaparin Injectable 40 milliGRAM(s) SubCutaneous every 24 hours     Activity in PT yesterday Noted.[Walked XX ft. with walker in room ]. [Sat up for 1 hours].    Labs today:  CBC:                     9.0    10.1  )-----------( 316      ( 07 Oct 2018 05:13 )             26.9     10-07    139  |  103  |  18  ----------------------------<  109<H>  4.0   |  29  |  0.91    CT Abdomen today: awaiting Radiologist report    Hospitalist, Gen Surgery  input noted    Primary Orthopedic Assessment:  • Stable from Orthopedic perspective  • Neuro motor exam stable of UE.  • Labs: CBC with min - mod Post-Op anemia, WBC improved      Plan:   • Continue:  PT/OT/Elevation in sling  / Ice to Shoulder/       • Continue DVT prophylaxis as prescribed, including use of compression devices and ankle pumps  • Continue Pain Rx  • Plans per Medicine Surgery  • Discharge planning – anticipated discharge is  subacute rehabilitation when medically stable & cleared by PT/OT

## 2018-10-07 NOTE — PROGRESS NOTE ADULT - SUBJECTIVE AND OBJECTIVE BOX
feels better    had bm    abdomen-llq tenderness,  otherwise soft      labs:    Complete Blood Count in AM (10.07.18 @ 05:13)    WBC Count: 10.1 K/uL    RBC Count: 2.62 M/uL    Hemoglobin: 9.0 g/dL    Hematocrit: 26.9 %    Mean Cell Volume: 102.6 fl    Mean Cell Hemoglobin: 34.2 pg    Mean Cell Hemoglobin Conc: 33.3 gm/dL    Red Cell Distrib Width: 13.2 %    Platelet Count - Automated: 316 K/uL

## 2018-10-07 NOTE — PROGRESS NOTE ADULT - PROBLEM SELECTOR PLAN 10
Xray of the abdomen shows bowel distention and possible ileus , Dr Vilchis Surgical consult noted   Abdominal pain persist CT of abdomen /pelvis today    labs for this am pending

## 2018-10-08 ENCOUNTER — TRANSCRIPTION ENCOUNTER (OUTPATIENT)
Age: 73
End: 2018-10-08

## 2018-10-08 VITALS — WEIGHT: 143.74 LBS

## 2018-10-08 PROBLEM — F10.10 ALCOHOL ABUSE, UNCOMPLICATED: Chronic | Status: ACTIVE | Noted: 2018-09-29

## 2018-10-08 LAB
APPEARANCE UR: ABNORMAL
BILIRUB UR-MCNC: NEGATIVE — SIGNIFICANT CHANGE UP
COLOR SPEC: SIGNIFICANT CHANGE UP
DIFF PNL FLD: NEGATIVE — SIGNIFICANT CHANGE UP
GLUCOSE BLDC GLUCOMTR-MCNC: 183 MG/DL — HIGH (ref 70–99)
GLUCOSE UR QL: NEGATIVE — SIGNIFICANT CHANGE UP
KETONES UR-MCNC: NEGATIVE — SIGNIFICANT CHANGE UP
LEUKOCYTE ESTERASE UR-ACNC: NEGATIVE — SIGNIFICANT CHANGE UP
NITRITE UR-MCNC: NEGATIVE — SIGNIFICANT CHANGE UP
PH UR: 7 — SIGNIFICANT CHANGE UP (ref 5–8)
PROT UR-MCNC: NEGATIVE — SIGNIFICANT CHANGE UP
SP GR SPEC: 1 — LOW (ref 1.01–1.02)
UROBILINOGEN FLD QL: NEGATIVE — SIGNIFICANT CHANGE UP

## 2018-10-08 RX ORDER — MAGNESIUM OXIDE 400 MG ORAL TABLET 241.3 MG
1 TABLET ORAL
Qty: 0 | Refills: 0 | COMMUNITY
Start: 2018-10-08

## 2018-10-08 RX ORDER — OXYCODONE HYDROCHLORIDE 5 MG/1
1 TABLET ORAL
Qty: 0 | Refills: 0 | COMMUNITY
Start: 2018-10-08

## 2018-10-08 RX ORDER — ENOXAPARIN SODIUM 100 MG/ML
40 INJECTION SUBCUTANEOUS
Qty: 0 | Refills: 0 | COMMUNITY
Start: 2018-10-08

## 2018-10-08 RX ORDER — POLYETHYLENE GLYCOL 3350 17 G/17G
17 POWDER, FOR SOLUTION ORAL
Qty: 0 | Refills: 0 | COMMUNITY
Start: 2018-10-08

## 2018-10-08 RX ORDER — SENNA PLUS 8.6 MG/1
2 TABLET ORAL
Qty: 0 | Refills: 0 | COMMUNITY
Start: 2018-10-08

## 2018-10-08 RX ORDER — FUROSEMIDE 40 MG
40 TABLET ORAL DAILY
Qty: 0 | Refills: 0 | Status: DISCONTINUED | OUTPATIENT
Start: 2018-10-08 | End: 2018-10-08

## 2018-10-08 RX ORDER — FUROSEMIDE 40 MG
1 TABLET ORAL
Qty: 0 | Refills: 0 | COMMUNITY
Start: 2018-10-08

## 2018-10-08 RX ADMIN — HYDROMORPHONE HYDROCHLORIDE 0.5 MILLIGRAM(S): 2 INJECTION INTRAMUSCULAR; INTRAVENOUS; SUBCUTANEOUS at 03:29

## 2018-10-08 RX ADMIN — Medication 100 MILLIGRAM(S): at 05:27

## 2018-10-08 RX ADMIN — LOSARTAN POTASSIUM 50 MILLIGRAM(S): 100 TABLET, FILM COATED ORAL at 05:27

## 2018-10-08 RX ADMIN — POLYETHYLENE GLYCOL 3350 17 GRAM(S): 17 POWDER, FOR SOLUTION ORAL at 11:10

## 2018-10-08 RX ADMIN — PANTOPRAZOLE SODIUM 40 MILLIGRAM(S): 20 TABLET, DELAYED RELEASE ORAL at 11:10

## 2018-10-08 RX ADMIN — HYDROMORPHONE HYDROCHLORIDE 0.5 MILLIGRAM(S): 2 INJECTION INTRAMUSCULAR; INTRAVENOUS; SUBCUTANEOUS at 03:44

## 2018-10-08 RX ADMIN — MAGNESIUM OXIDE 400 MG ORAL TABLET 400 MILLIGRAM(S): 241.3 TABLET ORAL at 11:10

## 2018-10-08 RX ADMIN — Medication 60 MILLIGRAM(S): at 05:27

## 2018-10-08 RX ADMIN — BUDESONIDE AND FORMOTEROL FUMARATE DIHYDRATE 2 PUFF(S): 160; 4.5 AEROSOL RESPIRATORY (INHALATION) at 08:27

## 2018-10-08 NOTE — DISCHARGE NOTE ADULT - HOSPITAL COURSE
73 yr old s/p fall, alcoholism, s/p right shoulder venu arthroplasty.  Has a history of essential htn, dyslipidemia, CAD, s/p  MI, s/p CABG.  Patient developed CHF exacerbation during the hospitalization.  This resolved with the use of diuretics.  Patient with possible ileus which resolved with bowel movement.

## 2018-10-08 NOTE — PROGRESS NOTE ADULT - ASSESSMENT
73 yr old s/p fall, alcoholism, s/p right shoulder venu arthroplasty.  Has a history of essential htn, dyslipidemia, CAD, s/p  MI, s/p CABG.  Mid-moderate LV dysfunction.  Patient urinating ok.  patient ready for discharge today

## 2018-10-08 NOTE — DISCHARGE NOTE ADULT - SECONDARY DIAGNOSIS.
Acute on chronic diastolic (congestive) heart failure Alcohol abuse Anemia due to blood loss Type 2 diabetes mellitus with other kidney complication Essential hypertension

## 2018-10-08 NOTE — PROGRESS NOTE ADULT - ASSESSMENT
Surgically stable  hx of CHF   hx of CABG/DM/PVD/ETOH abuse  Anemia of acute blood loss   Resolved ileus prob from narcotics/constipation    Plan:  Continue PT/OT protocol            Lovenox for DVT prophylaxis            As per medicine / cardio            D/c to [facility] today

## 2018-10-08 NOTE — DISCHARGE NOTE ADULT - MEDICATION SUMMARY - MEDICATIONS TO TAKE
I will START or STAY ON the medications listed below when I get home from the hospital:    oxyCODONE 10 mg oral tablet  -- 1 tab(s) by mouth every 4 hours, As needed, Moderate Pain (4 - 6)  -- Indication: For Pain    oxyCODONE 5 mg oral tablet  -- 1 tab(s) by mouth every 4 hours, As needed, Mild Pain (1 - 3)  -- Indication: For Pain    losartan 50 mg oral tablet  -- 1 tab(s) by mouth once a day  -- Indication: For BP    enoxaparin  -- use for 7 days  -- Indication: For Anticoagulation    metFORMIN 500 mg oral tablet  -- 1 tab(s) by mouth 2 times a day  -- Indication: For diabetes    simvastatin  -- 40 milligram(s) by mouth once a day (at bedtime)  -- Indication: For Cholesterol    Toprol-XL  -- 100 milligram(s) by mouth once a day  -- Indication: For bp    furosemide 40 mg oral tablet  -- 1 tab(s) by mouth once a day  -- Indication: For Chf    polyethylene glycol 3350 oral powder for reconstitution  -- 17 gram(s) by mouth once a day  -- Indication: For Constipation    senna oral tablet  -- 2 tab(s) by mouth once a day (at bedtime), As needed, Constipation  -- Indication: For Constipation    magnesium oxide 400 mg (241.3 mg elemental magnesium) oral tablet  -- 1 tab(s) by mouth once a day  -- Indication: For Constipation

## 2018-10-08 NOTE — DISCHARGE NOTE ADULT - PLAN OF CARE
Improve functionality acute rehab maintain homeostasis stay on diuretic and beta blocker maintain abstinence stay away from alcohol follow up with PMD blood glucose control intermittent monitoring stay controlled medication compliance

## 2018-10-08 NOTE — DISCHARGE NOTE ADULT - PATIENT PORTAL LINK FT
You can access the [facility] Patient Portal, offered by [facility], by registering with the following website: [email or web address]

## 2018-10-08 NOTE — PROGRESS NOTE ADULT - SUBJECTIVE AND OBJECTIVE BOX
Surgery PA Note:  POD # 7   S/P right Shoulder Hemiarthroplasty Surgery PA Note:  POD # 7   S/P right Shoulder Hemiarthroplasty     Patient is sitting up in chair drinking her tea.  She is still c/o surgical discomfort 6/10 when moving at all or examined.  Patient   c/o feeling tired and not feeling great.      Vital Signs Last 24 Hrs  T(C): 36.5 (08 Oct 2018 10:24), Max: 36.8 (07 Oct 2018 20:34)  T(F): 97.7 (08 Oct 2018 10:24), Max: 98.3 (07 Oct 2018 20:34)  HR: 70 (08 Oct 2018 10:24) (70 - 92)  BP: 101/62 (08 Oct 2018 10:24) (101/62 - 122/88)  BP(mean): --  RR: 16 (08 Oct 2018 10:24) (15 - 18)  SpO2: 94% (08 Oct 2018 10:24) (92% - 97%)    PE:    alert and oriented x 2   Lungs:  CTA without wheezes   Cor:  S1S2  Abd:   soft +BS +BM LLQ tenderness improved             voiding adequately   Right shoulder in sling / Dressing removed incision C/D/I nylon sutures in place still ecchymosis noted upper arm   fingers warm + 1 edema good capillary refill good ROM with pain 4/10 decreased  strength prob secondary to pain today   + radial pulse   Extremities:  without edema without calf pain                      SCD's while in bed.                        9.0    10.1  )-----------( 316      ( 07 Oct 2018 05:13 )             26.9   10-07    139  |  103  |  18  ----------------------------<  109<H>  4.0   |  29  |  0.91    Ca    8.2<L>      07 Oct 2018 05:13    TPro  5.8<L>  /  Alb  2.2<L>  /  TBili  0.6  /  DBili  x   /  AST  15  /  ALT  9<L>  /  AlkPhos  61  10-07

## 2018-10-08 NOTE — CONSULT NOTE ADULT - SUBJECTIVE AND OBJECTIVE BOX
History of Present Illness:  73y Female with a history of diabetes and prior endovascular repair of AAA fell and fractures her right shoulder. She is s/p hemiarthroplasty of her right shoulder. She has been constipated from analgesics and had abdominal pain yesterday that resolved after 2 BM. She  has a hx of  diverticulosis.  I was requested to evaluate her aorta. CT scan was reviewed and shows no evidence for a endoleak.    PAST MEDICAL & SURGICAL HISTORY:  Alcohol abuse  HTN (hypertension)  Asthma  DM (diabetes mellitus)  S/P CABG x 4      Allergies    dust (Unknown)  latex (Unknown)  Originally Entered as [Unknown] reaction to [CATHAIR] (Unknown)  Originally Entered as [Unknown] reaction to [MILDEW] (Unknown)  Zosyn (Unknown)    Intolerances        MEDICATIONS  (STANDING):  atorvastatin 20 milliGRAM(s) Oral at bedtime  buDESOnide  80 MICROgram(s)/formoterol 4.5 MICROgram(s) Inhaler 2 Puff(s) Inhalation two times a day  enoxaparin Injectable 40 milliGRAM(s) SubCutaneous every 24 hours  furosemide    Tablet 40 milliGRAM(s) Oral daily  losartan 50 milliGRAM(s) Oral daily  magnesium oxide 400 milliGRAM(s) Oral daily  metoprolol succinate  milliGRAM(s) Oral daily  pantoprazole  Injectable 40 milliGRAM(s) IV Push daily  polyethylene glycol 3350 17 Gram(s) Oral daily    MEDICATIONS  (PRN):  acetaminophen  Suppository .. 650 milliGRAM(s) Rectal every 4 hours PRN Temp greater or equal to 38C (100.4F), Moderate Pain (4 - 6)  glucagon  Injectable 1 milliGRAM(s) IntraMuscular once PRN Glucose LESS THAN 70 milligrams/deciliter  HYDROmorphone  Injectable 0.5 milliGRAM(s) IV Push every 3 hours PRN Severe Pain (7 - 10)  ondansetron Injectable 4 milliGRAM(s) IV Push every 6 hours PRN Nausea and/or Vomiting  oxyCODONE    IR 10 milliGRAM(s) Oral every 4 hours PRN Moderate Pain (4 - 6)  oxyCODONE    IR 5 milliGRAM(s) Oral every 4 hours PRN Mild Pain (1 - 3)  senna 2 Tablet(s) Oral at bedtime PRN Constipation      Social History:  Smoking History: past  hx.   Alcohol Use: hx of alcohol abuse.    REVIEW OF SYSTEMS:  CONSTITUTIONAL: No weakness, fevers or chills  EYES/ENT: No visual changes;  No vertigo or throat pain   NECK: No pain or stiffness  RESPIRATORY: No cough, wheezing, hemoptysis; No shortness of breath  CARDIOVASCULAR: No chest pain or palpitations  GASTROINTESTINAL: No abdominal or epigastric pain. No nausea, vomiting, or hematemesis; No diarrhea. Resolved constipation. No melena or hematochezia.  GENITOURINARY: No dysuria, frequency or hematuria  NEUROLOGICAL: No numbness or weakness  SKIN: No itching, burning, rashes, or lesions   Vascular:  No lower extremity claudication, pedal rest pain or digital ulcers  All other review of systems is negative unless indicated above.    PHYSICAL EXAM:  General:  On exam, the patient is alert nontoxic appearing Female in no acute distress.  Vital Signs Last 24 Hrs  T(C): 36.5 (08 Oct 2018 10:24), Max: 36.8 (07 Oct 2018 20:34)  T(F): 97.7 (08 Oct 2018 10:24), Max: 98.3 (07 Oct 2018 20:34)  HR: 70 (08 Oct 2018 10:24) (70 - 92)  BP: 101/62 (08 Oct 2018 10:24) (101/62 - 122/88)  BP(mean): --  RR: 16 (08 Oct 2018 10:24) (15 - 18)  SpO2: 94% (08 Oct 2018 10:24) (92% - 97%)    Neck:  4+/4+ bilateral carotid pulses; no carotid bruit, no palpable cervical masses.  Heart:  Regular, no murmurs, rubs or gallops.    Lungs:  decreased BS at both bases   Chest:  No chest wall deformities  Symmetrical chest expansion.   Abdomen: Soft and nontender.  No palpable masses.  No rebound, guarding or rigidity.  Extremities:  Feet are warm, pink with normal capillary refill times.  There are no digital ulcers or heel decubiti.  The calf and thigh muscles are soft and nontender.  There are no palpable cords or limb cellulitis.  Keren's sign  is negative bilaterally.  There is no lower extremity edema, cyanosis, or rubor.  On examination of the peripheral pulses:  Left leg femoral pulse is 4/4   , popliteal pulse is 2/4   ,PT Pulse is  2/4  , DP Pulse is 2/4   Right leg femoral pulse is 4/4    ,popliteal pulse is  2/4  , PT Pulse is 0  , DP Pulse is 2/4   Neurological:  There are no motor or sensory deficits in either lower extremity.                          9.0    10.1  )-----------( 316      ( 07 Oct 2018 05:13 )             26.9     10-07    139  |  103  |  18  ----------------------------<  109<H>  4.0   |  29  |  0.91    Ca    8.2<L>      07 Oct 2018 05:13    TPro  5.8<L>  /  Alb  2.2<L>  /  TBili  0.6  /  DBili  x   /  AST  15  /  ALT  9<L>  /  AlkPhos  61  10-07            Radiology:

## 2018-10-08 NOTE — PROGRESS NOTE ADULT - PROBLEM SELECTOR PLAN 9
dvt ppx: heparin subq
per ortho discharged to subacute with 7 days of lovenox
dvt ppx: heparin subq

## 2018-10-08 NOTE — CONSULT NOTE ADULT - ASSESSMENT
73 y.o. female with prior endovascular repair of AAA and hx. of diverticulosis had abdominal pain yesterday that has resolved after 2 BM. The abdominal pain was probably secondary to constipation from her analgesic use. The abdominal CT scan was reviewed and shows no evidence for endoleak The aorta was not the source of her discomfort.

## 2018-10-08 NOTE — PROGRESS NOTE ADULT - PROVIDER SPECIALTY LIST ADULT
Anesthesia
Cardiology
Critical Care
Hospitalist
Orthopedics
Surgery
Surgery
Cardiology
Orthopedics

## 2018-10-08 NOTE — PROGRESS NOTE ADULT - SUBJECTIVE AND OBJECTIVE BOX
Patient is a 73y old  Female who presents with a chief complaint of fall off toilet (08 Oct 2018 07:48)      Patient seen and examined at bedside.  Patient denies any chest pain or shortness of breath.  Reports tolerable pain from right shoulder.  Discussed fears of being discharged to rehab.  Patient reports she is frightened that she will not get enough help toileting in the rehab center.  She agreed that she is not getting stronger in the hospital.  Ms. Rodríguez agreed to go to rehab today in an effort to become stronger.    ALLERGIES:  dust (Unknown)  latex (Unknown)  Originally Entered as [Unknown] reaction to [CATHAIR] (Unknown)  Originally Entered as [Unknown] reaction to [MILDEW] (Unknown)  Zosyn (Unknown)    MEDICATIONS:  acetaminophen  Suppository .. 650 milliGRAM(s) Rectal every 4 hours PRN  atorvastatin 20 milliGRAM(s) Oral at bedtime  buDESOnide  80 MICROgram(s)/formoterol 4.5 MICROgram(s) Inhaler 2 Puff(s) Inhalation two times a day  furosemide    Tablet 40 milliGRAM(s) Oral daily  magnesium oxide 400 milliGRAM(s) Oral daily  pantoprazole  Injectable 40 milliGRAM(s) IV Push daily  polyethylene glycol 3350 17 Gram(s) Oral daily  senna 2 Tablet(s) Oral at bedtime PRN    Vital Signs Last 24 Hrs  T(F): 97.7 (08 Oct 2018 10:24), Max: 98.3 (07 Oct 2018 20:34)  HR: 70 (08 Oct 2018 10:24) (70 - 92)  BP: 101/62 (08 Oct 2018 10:24) (101/62 - 122/88)  RR: 16 (08 Oct 2018 10:24) (15 - 18)  SpO2: 94% (08 Oct 2018 10:24) (92% - 97%)  I&O's Summary    07 Oct 2018 07:  -  08 Oct 2018 07:00  --------------------------------------------------------  IN: 920 mL / OUT: 1551 mL / NET: -631 mL    08 Oct 2018 07:01  -  08 Oct 2018 11:02  --------------------------------------------------------  IN: 200 mL / OUT: 0 mL / NET: 200 mL        PHYSICAL EXAM:  General: NAD, A/O x 3  ENT: MMM  Neck: Supple, No JVD  Lungs: Basal crackle on right  Cardio: RRR, S1/S2, No murmurs  Abdomen: Soft, Nontender, Nondistended; Bowel sounds present  Extremities: No cyanosis, No edema, right should in sing    LABS:                        9.0    10.1  )-----------( 316      ( 07 Oct 2018 05:13 )             26.9     10    139  |  103  |  18  ----------------------------<  109  4.0   |  29  |  0.91    Ca    8.2      07 Oct 2018 05:13  Mg     1.2     10    TPro  5.8  /  Alb  2.2  /  TBili  0.6  /  DBili  x   /  AST  15  /  ALT  9   /  AlkPhos  61  10    eGFR if Non African American: 63 mL/min/1.73M2 (10-07-18 @ 05:13)  eGFR if African American: 73 mL/min/1.73M2 (10-07-18 @ 05:13)    CAPILLARY BLOOD GLUCOSE         [id number] 5.7    Urinalysis Basic - ( 08 Oct 2018 06:30 )    Color: pale yellow / Appearance: very cloudy / S.005 / pH: x  Gluc: x / Ketone: Negative  / Bili: Negative / Urobili: Negative   Blood: x / Protein: Negative / Nitrite: Negative   Leuk Esterase: Negative / RBC: Negative /HPF / WBC Negative /HPF   Sq Epi: x / Non Sq Epi: Neg.-Few / Bacteria: Trace /HPF          RADIOLOGY & ADDITIONAL TESTS:    Care Discussed with Consultants/Other Providers: Ms. Camacho Patient is a 73y old  Female who presents with a chief complaint of fall off toilet (08 Oct 2018 07:48)      Patient seen and examined at bedside.  Patient denies any chest pain or shortness of breath.  Reports tolerable pain from right shoulder.  Discussed fears of being discharged to rehab.  Patient reports she is frightened that she will not get enough help toileting in the rehab center.  She agreed that she is not getting stronger in the hospital.  Ms. Rodríguez agreed to go to rehab today in an effort to become stronger.    ALLERGIES:  dust (Unknown)  latex (Unknown)  Originally Entered as [Unknown] reaction to [CATHAIR] (Unknown)  Originally Entered as [Unknown] reaction to [MILDEW] (Unknown)  Zosyn (Unknown)    MEDICATIONS:  acetaminophen  Suppository .. 650 milliGRAM(s) Rectal every 4 hours PRN  atorvastatin 20 milliGRAM(s) Oral at bedtime  buDESOnide  80 MICROgram(s)/formoterol 4.5 MICROgram(s) Inhaler 2 Puff(s) Inhalation two times a day  furosemide    Tablet 40 milliGRAM(s) Oral daily  magnesium oxide 400 milliGRAM(s) Oral daily  pantoprazole  Injectable 40 milliGRAM(s) IV Push daily  polyethylene glycol 3350 17 Gram(s) Oral daily  senna 2 Tablet(s) Oral at bedtime PRN    Vital Signs Last 24 Hrs  T(F): 97.7 (08 Oct 2018 10:24), Max: 98.3 (07 Oct 2018 20:34)  HR: 70 (08 Oct 2018 10:24) (70 - 92)  BP: 101/62 (08 Oct 2018 10:24) (101/62 - 122/88)  RR: 16 (08 Oct 2018 10:24) (15 - 18)  SpO2: 94% (08 Oct 2018 10:24) (92% - 97%)  I&O's Summary    07 Oct 2018 07:  -  08 Oct 2018 07:00  --------------------------------------------------------  IN: 920 mL / OUT: 1551 mL / NET: -631 mL    08 Oct 2018 07:01  -  08 Oct 2018 11:02  --------------------------------------------------------  IN: 200 mL / OUT: 0 mL / NET: 200 mL        PHYSICAL EXAM:  General: NAD, A/O x 3  ENT: MMM  Neck: Supple, No JVD  Lungs: Basal crackle on right  Cardio: RRR, S1/S2, No murmurs  Abdomen: Soft, Nontender, Nondistended; Bowel sounds present  Extremities: No cyanosis, No edema, right should in sing    LABS:                        9.0    10.1  )-----------( 316      ( 07 Oct 2018 05:13 )             26.9     10    139  |  103  |  18  ----------------------------<  109  4.0   |  29  |  0.91    Ca    8.2      07 Oct 2018 05:13  Mg     1.2     10    TPro  5.8  /  Alb  2.2  /  TBili  0.6  /  DBili  x   /  AST  15  /  ALT  9   /  AlkPhos  61  10    eGFR if Non African American: 63 mL/min/1.73M2 (10-07-18 @ 05:13)  eGFR if African American: 73 mL/min/1.73M2 (10-07-18 @ 05:13)    CAPILLARY BLOOD GLUCOSE         [id number] 5.7    Urinalysis Basic - ( 08 Oct 2018 06:30 )    Color: pale yellow / Appearance: very cloudy / S.005 / pH: x  Gluc: x / Ketone: Negative  / Bili: Negative / Urobili: Negative   Blood: x / Protein: Negative / Nitrite: Negative   Leuk Esterase: Negative / RBC: Negative /HPF / WBC Negative /HPF   Sq Epi: x / Non Sq Epi: Neg.-Few / Bacteria: Trace /HPF          RADIOLOGY & ADDITIONAL TESTS:    Care Discussed with Consultants/Other Providers: Ms. Camacho, Dr. Duff

## 2018-10-08 NOTE — PHARMACOTHERAPY INTERVENTION NOTE - COMMENTS
pts BG both serum and POC well below 180 (120 or less) and is on correction insulin here - Metformin held at this point
verify 2 doses post op only

## 2018-10-08 NOTE — PROGRESS NOTE ADULT - REASON FOR ADMISSION
S/P Hemiarthroplasty Right Shoulder 10/1/18
S/P right Shoulder Hemiarthroplasty
fall off toilet
s/p right shoulder hemiarthroplasty
fall off toilet

## 2018-10-08 NOTE — DISCHARGE NOTE ADULT - CARE PROVIDER_API CALL
Dillan Son), Family Medicine  [address] [place] [address]  Phone: [phone]  Fax: [phone]

## 2018-10-08 NOTE — DISCHARGE NOTE ADULT - CARE PLAN
Principal Discharge DX:	Closed fracture of shaft of right humerus, unspecified fracture morphology, initial encounter  Goal:	Improve functionality  Assessment and plan of treatment:	acute rehab  Secondary Diagnosis:	Acute on chronic diastolic (congestive) heart failure  Goal:	maintain homeostasis  Assessment and plan of treatment:	stay on diuretic and beta blocker  Secondary Diagnosis:	Alcohol abuse  Goal:	maintain abstinence  Assessment and plan of treatment:	stay away from alcohol  Secondary Diagnosis:	Anemia due to blood loss  Goal:	maintain homeostasis  Assessment and plan of treatment:	follow up with PMD  Secondary Diagnosis:	Type 2 diabetes mellitus with other kidney complication  Goal:	blood glucose control  Assessment and plan of treatment:	intermittent monitoring  Secondary Diagnosis:	Essential hypertension  Goal:	stay controlled  Assessment and plan of treatment:	medication compliance

## 2018-10-15 ENCOUNTER — APPOINTMENT (OUTPATIENT)
Dept: ORTHOPEDIC SURGERY | Facility: CLINIC | Age: 73
End: 2018-10-15

## 2018-10-24 ENCOUNTER — APPOINTMENT (OUTPATIENT)
Dept: ORTHOPEDIC SURGERY | Facility: CLINIC | Age: 73
End: 2018-10-24

## 2018-11-12 NOTE — ED PROVIDER NOTE - PR
Hi Dr. Lucero, I'm seeing Amanda in the office today, and she asked if she has a stent in place following embolization of an angiomyolipoma. It appears, per the implants section in the chart, that something is in place, but I'm having difficulty determining exactly what that is. Do you happen to know if there is a stent or something else still in place? Or should this be removed from the implants section of the chart? Thanks so much,Sisi Hawkins
136

## 2018-11-27 PROCEDURE — 82550 ASSAY OF CK (CPK): CPT

## 2018-11-27 PROCEDURE — 81001 URINALYSIS AUTO W/SCOPE: CPT

## 2018-11-27 PROCEDURE — 82607 VITAMIN B-12: CPT

## 2018-11-27 PROCEDURE — 96374 THER/PROPH/DIAG INJ IV PUSH: CPT | Mod: XU

## 2018-11-27 PROCEDURE — 94640 AIRWAY INHALATION TREATMENT: CPT

## 2018-11-27 PROCEDURE — 82962 GLUCOSE BLOOD TEST: CPT

## 2018-11-27 PROCEDURE — 86850 RBC ANTIBODY SCREEN: CPT

## 2018-11-27 PROCEDURE — 86900 BLOOD TYPING SEROLOGIC ABO: CPT

## 2018-11-27 PROCEDURE — 36620 INSERTION CATHETER ARTERY: CPT

## 2018-11-27 PROCEDURE — 12041 INTMD RPR N-HF/GENIT 2.5CM/<: CPT | Mod: XU

## 2018-11-27 PROCEDURE — 84484 ASSAY OF TROPONIN QUANT: CPT

## 2018-11-27 PROCEDURE — 96375 TX/PRO/DX INJ NEW DRUG ADDON: CPT | Mod: XU

## 2018-11-27 PROCEDURE — 71045 X-RAY EXAM CHEST 1 VIEW: CPT

## 2018-11-27 PROCEDURE — 73200 CT UPPER EXTREMITY W/O DYE: CPT

## 2018-11-27 PROCEDURE — 85730 THROMBOPLASTIN TIME PARTIAL: CPT

## 2018-11-27 PROCEDURE — 80307 DRUG TEST PRSMV CHEM ANLYZR: CPT

## 2018-11-27 PROCEDURE — 73030 X-RAY EXAM OF SHOULDER: CPT

## 2018-11-27 PROCEDURE — 85027 COMPLETE CBC AUTOMATED: CPT

## 2018-11-27 PROCEDURE — 97167 OT EVAL HIGH COMPLEX 60 MIN: CPT

## 2018-11-27 PROCEDURE — 74176 CT ABD & PELVIS W/O CONTRAST: CPT

## 2018-11-27 PROCEDURE — C1713: CPT

## 2018-11-27 PROCEDURE — 73120 X-RAY EXAM OF HAND: CPT

## 2018-11-27 PROCEDURE — 88305 TISSUE EXAM BY PATHOLOGIST: CPT

## 2018-11-27 PROCEDURE — 97530 THERAPEUTIC ACTIVITIES: CPT

## 2018-11-27 PROCEDURE — 80053 COMPREHEN METABOLIC PANEL: CPT

## 2018-11-27 PROCEDURE — 97116 GAIT TRAINING THERAPY: CPT

## 2018-11-27 PROCEDURE — 86901 BLOOD TYPING SEROLOGIC RH(D): CPT

## 2018-11-27 PROCEDURE — 83921 ORGANIC ACID SINGLE QUANT: CPT

## 2018-11-27 PROCEDURE — 97162 PT EVAL MOD COMPLEX 30 MIN: CPT

## 2018-11-27 PROCEDURE — 93306 TTE W/DOPPLER COMPLETE: CPT

## 2018-11-27 PROCEDURE — 93005 ELECTROCARDIOGRAM TRACING: CPT

## 2018-11-27 PROCEDURE — 88311 DECALCIFY TISSUE: CPT

## 2018-11-27 PROCEDURE — 99285 EMERGENCY DEPT VISIT HI MDM: CPT | Mod: 25

## 2018-11-27 PROCEDURE — 83735 ASSAY OF MAGNESIUM: CPT

## 2018-11-27 PROCEDURE — 90715 TDAP VACCINE 7 YRS/> IM: CPT

## 2018-11-27 PROCEDURE — 74018 RADEX ABDOMEN 1 VIEW: CPT

## 2018-11-27 PROCEDURE — 83036 HEMOGLOBIN GLYCOSYLATED A1C: CPT

## 2018-11-27 PROCEDURE — 76376 3D RENDER W/INTRP POSTPROCES: CPT

## 2018-11-27 PROCEDURE — 85610 PROTHROMBIN TIME: CPT

## 2018-11-27 PROCEDURE — C1776: CPT

## 2018-11-27 PROCEDURE — 70450 CT HEAD/BRAIN W/O DYE: CPT

## 2018-11-27 PROCEDURE — 83880 ASSAY OF NATRIURETIC PEPTIDE: CPT

## 2018-11-27 PROCEDURE — 80048 BASIC METABOLIC PNL TOTAL CA: CPT

## 2018-11-27 PROCEDURE — 76000 FLUOROSCOPY <1 HR PHYS/QHP: CPT

## 2018-11-27 PROCEDURE — 73060 X-RAY EXAM OF HUMERUS: CPT

## 2018-12-26 ENCOUNTER — EMERGENCY (EMERGENCY)
Facility: HOSPITAL | Age: 73
LOS: 1 days | Discharge: HOME | End: 2018-12-26
Attending: EMERGENCY MEDICINE | Admitting: EMERGENCY MEDICINE
Payer: MEDICARE

## 2018-12-26 VITALS
WEIGHT: 145.06 LBS | OXYGEN SATURATION: 97 % | DIASTOLIC BLOOD PRESSURE: 78 MMHG | RESPIRATION RATE: 16 BRPM | HEART RATE: 85 BPM | HEIGHT: 64 IN | SYSTOLIC BLOOD PRESSURE: 159 MMHG | TEMPERATURE: 98 F

## 2018-12-26 DIAGNOSIS — S49.90XA UNSPECIFIED INJURY OF SHOULDER AND UPPER ARM, UNSPECIFIED ARM, INITIAL ENCOUNTER: ICD-10-CM

## 2018-12-26 DIAGNOSIS — Z95.1 PRESENCE OF AORTOCORONARY BYPASS GRAFT: Chronic | ICD-10-CM

## 2018-12-26 PROCEDURE — 96372 THER/PROPH/DIAG INJ SC/IM: CPT

## 2018-12-26 PROCEDURE — 73030 X-RAY EXAM OF SHOULDER: CPT | Mod: 26,RT

## 2018-12-26 PROCEDURE — 73060 X-RAY EXAM OF HUMERUS: CPT

## 2018-12-26 PROCEDURE — 99284 EMERGENCY DEPT VISIT MOD MDM: CPT | Mod: 25

## 2018-12-26 PROCEDURE — 73562 X-RAY EXAM OF KNEE 3: CPT

## 2018-12-26 PROCEDURE — 71101 X-RAY EXAM UNILAT RIBS/CHEST: CPT | Mod: 26

## 2018-12-26 PROCEDURE — 99283 EMERGENCY DEPT VISIT LOW MDM: CPT

## 2018-12-26 PROCEDURE — 71101 X-RAY EXAM UNILAT RIBS/CHEST: CPT

## 2018-12-26 PROCEDURE — 73060 X-RAY EXAM OF HUMERUS: CPT | Mod: 26,RT

## 2018-12-26 PROCEDURE — 73030 X-RAY EXAM OF SHOULDER: CPT

## 2018-12-26 PROCEDURE — 73562 X-RAY EXAM OF KNEE 3: CPT | Mod: 26,RT

## 2018-12-26 RX ORDER — KETOROLAC TROMETHAMINE 30 MG/ML
15 SYRINGE (ML) INJECTION ONCE
Qty: 0 | Refills: 0 | Status: DISCONTINUED | OUTPATIENT
Start: 2018-12-26 | End: 2018-12-26

## 2018-12-26 RX ORDER — ACETAMINOPHEN 500 MG
650 TABLET ORAL ONCE
Qty: 0 | Refills: 0 | Status: COMPLETED | OUTPATIENT
Start: 2018-12-26 | End: 2018-12-26

## 2018-12-26 RX ADMIN — Medication 15 MILLIGRAM(S): at 22:26

## 2018-12-26 RX ADMIN — Medication 650 MILLIGRAM(S): at 21:31

## 2018-12-26 NOTE — ED ADULT NURSE NOTE - NSIMPLEMENTINTERV_GEN_ALL_ED
Implemented All Fall Risk Interventions:  [place] to call system. Call bell, personal items and telephone within reach. Instruct patient to call for assistance. Room bathroom lighting operational. Non-slip footwear when patient is off stretcher. Physically safe environment: no spills, clutter or unnecessary equipment. Stretcher in lowest position, wheels locked, appropriate side rails in place. Provide visual cue, wrist band, yellow gown, etc. Monitor gait and stability. Monitor for mental status changes and reorient to person, place, and time. Review medications for side effects contributing to fall risk. Reinforce activity limits and safety measures with patient and family.

## 2018-12-26 NOTE — ED PROVIDER NOTE - CONSTITUTIONAL, MLM
normal... Well appearing, well nourished, awake, alert with poor hygeine, and in no apparent distress.

## 2018-12-26 NOTE — ED PROVIDER NOTE - NSFOLLOWUPINSTRUCTIONS_ED_ALL_ED_FT
Follow up with your primary care doctor or orthopedist.   No fracture is seen. Your xray will be read over by the radiologist and if there is a discrepancy, we will contact you.   Take Tylenol 650mg as needed for pain.   You were provided with a sling for comfort. Do not use this 24/7. Please remove it and do gentle range of motion exercises.   Return to the ER should there be worsening or concerns.

## 2018-12-26 NOTE — ED PROVIDER NOTE - OBJECTIVE STATEMENT
Pt describes a fall. She said she had a history of arm fracture that is s/p repair. She c/o arm pain and is concerned if she broke it again. She complains of pain to the right shoulder and arm. No head injury or LOC. No distal numbness or tingling. Said she fell and her  called 911 for help. She says she was able to be helped up. No hip or pelvis pain. Pt describes a mechanical trip and fall. She said she had a history of arm fracture that is s/p repair. She c/o arm pain and is concerned if she broke it again. She complains of pain to the right shoulder and arm. No head injury or LOC. No distal numbness or tingling. Said she fell and her  called 911 for help. She says she was able to be helped up. No hip or pelvis pain.

## 2018-12-26 NOTE — ED ADULT NURSE NOTE - OBJECTIVE STATEMENT
pt from  home  she  stated she previously  fell hurting her right shoulder and fell again today  now  stating  she cannot  move her shoulder  with mild pain

## 2018-12-26 NOTE — ED PROVIDER NOTE - PROGRESS NOTE DETAILS
Patient will be given a sling. She is willing to take cab to home. Her  is home. Recommended to f/u with her orthopedist (her operating doc). Tylenol 650mg prn pain. Return for worsening. ambulates without assistance.

## 2018-12-26 NOTE — ED PROVIDER NOTE - MEDICAL DECISION MAKING DETAILS
Plan to r/o periprosthetic fracture with xray. Pain control. If neg, d/c to home. No chest pain but there was rib tenderness on the right around rib 6/7.

## 2018-12-27 VITALS
HEART RATE: 84 BPM | DIASTOLIC BLOOD PRESSURE: 72 MMHG | RESPIRATION RATE: 18 BRPM | TEMPERATURE: 98 F | SYSTOLIC BLOOD PRESSURE: 142 MMHG | OXYGEN SATURATION: 98 %

## 2018-12-27 RX ORDER — ALPRAZOLAM 0.25 MG
0.25 TABLET ORAL ONCE
Qty: 0 | Refills: 0 | Status: DISCONTINUED | OUTPATIENT
Start: 2018-12-27 | End: 2018-12-27

## 2018-12-27 NOTE — ED ADULT NURSE REASSESSMENT NOTE - NS ED NURSE REASSESS COMMENT FT1
pt  has been  discharged however  she states she does not want  to leave at this time s/w ciera whitley and he states  we can keep her until the morning.
pt  rermained the night at  her request  pt able  to walk . arrangements made for pt to be transported home via cab this morning

## 2019-04-05 ENCOUNTER — INPATIENT (INPATIENT)
Facility: HOSPITAL | Age: 74
LOS: 7 days | Discharge: OTHER INSTITUTION | DRG: 551 | End: 2019-04-12
Attending: HOSPITALIST | Admitting: INTERNAL MEDICINE
Payer: MEDICARE

## 2019-04-05 VITALS
OXYGEN SATURATION: 95 % | HEART RATE: 76 BPM | SYSTOLIC BLOOD PRESSURE: 184 MMHG | DIASTOLIC BLOOD PRESSURE: 105 MMHG | WEIGHT: 139.99 LBS | HEIGHT: 64 IN | TEMPERATURE: 97 F | RESPIRATION RATE: 18 BRPM

## 2019-04-05 DIAGNOSIS — Z95.1 PRESENCE OF AORTOCORONARY BYPASS GRAFT: Chronic | ICD-10-CM

## 2019-04-05 DIAGNOSIS — S32.000A WEDGE COMPRESSION FRACTURE OF UNSPECIFIED LUMBAR VERTEBRA, INITIAL ENCOUNTER FOR CLOSED FRACTURE: ICD-10-CM

## 2019-04-05 DIAGNOSIS — Z98.890 OTHER SPECIFIED POSTPROCEDURAL STATES: Chronic | ICD-10-CM

## 2019-04-05 DIAGNOSIS — M48.50XA COLLAPSED VERTEBRA, NOT ELSEWHERE CLASSIFIED, SITE UNSPECIFIED, INITIAL ENCOUNTER FOR FRACTURE: ICD-10-CM

## 2019-04-05 LAB
ALBUMIN SERPL ELPH-MCNC: 3 G/DL — LOW (ref 3.3–5)
ALBUMIN SERPL ELPH-MCNC: 3.1 G/DL — LOW (ref 3.3–5)
ALBUMIN SERPL ELPH-MCNC: 3.6 G/DL — SIGNIFICANT CHANGE UP (ref 3.3–5)
ALP SERPL-CCNC: 59 U/L — SIGNIFICANT CHANGE UP (ref 40–120)
ALP SERPL-CCNC: 62 U/L — SIGNIFICANT CHANGE UP (ref 40–120)
ALP SERPL-CCNC: 66 U/L — SIGNIFICANT CHANGE UP (ref 40–120)
ALT FLD-CCNC: 18 U/L DA — SIGNIFICANT CHANGE UP (ref 10–45)
ALT FLD-CCNC: 20 U/L DA — SIGNIFICANT CHANGE UP (ref 10–45)
ALT FLD-CCNC: 23 U/L DA — SIGNIFICANT CHANGE UP (ref 10–45)
ANION GAP SERPL CALC-SCNC: 10 MMOL/L — SIGNIFICANT CHANGE UP (ref 5–17)
ANION GAP SERPL CALC-SCNC: 5 MMOL/L — SIGNIFICANT CHANGE UP (ref 5–17)
ANION GAP SERPL CALC-SCNC: 9 MMOL/L — SIGNIFICANT CHANGE UP (ref 5–17)
AST SERPL-CCNC: 23 U/L — SIGNIFICANT CHANGE UP (ref 10–40)
AST SERPL-CCNC: 25 U/L — SIGNIFICANT CHANGE UP (ref 10–40)
AST SERPL-CCNC: 31 U/L — SIGNIFICANT CHANGE UP (ref 10–40)
BASOPHILS # BLD AUTO: 0.1 K/UL — SIGNIFICANT CHANGE UP (ref 0–0.2)
BASOPHILS NFR BLD AUTO: 1.7 % — SIGNIFICANT CHANGE UP (ref 0–2)
BILIRUB SERPL-MCNC: 0.3 MG/DL — SIGNIFICANT CHANGE UP (ref 0.2–1.2)
BILIRUB SERPL-MCNC: 0.4 MG/DL — SIGNIFICANT CHANGE UP (ref 0.2–1.2)
BILIRUB SERPL-MCNC: 0.6 MG/DL — SIGNIFICANT CHANGE UP (ref 0.2–1.2)
BUN SERPL-MCNC: 11 MG/DL — SIGNIFICANT CHANGE UP (ref 7–23)
BUN SERPL-MCNC: 12 MG/DL — SIGNIFICANT CHANGE UP (ref 7–23)
BUN SERPL-MCNC: 14 MG/DL — SIGNIFICANT CHANGE UP (ref 7–23)
CALCIUM SERPL-MCNC: 7.7 MG/DL — LOW (ref 8.4–10.5)
CALCIUM SERPL-MCNC: 7.8 MG/DL — LOW (ref 8.4–10.5)
CALCIUM SERPL-MCNC: 8.7 MG/DL — SIGNIFICANT CHANGE UP (ref 8.4–10.5)
CHLORIDE SERPL-SCNC: 93 MMOL/L — LOW (ref 96–108)
CHLORIDE SERPL-SCNC: 97 MMOL/L — SIGNIFICANT CHANGE UP (ref 96–108)
CHLORIDE SERPL-SCNC: 97 MMOL/L — SIGNIFICANT CHANGE UP (ref 96–108)
CK SERPL-CCNC: 234 U/L — HIGH (ref 25–170)
CO2 SERPL-SCNC: 25 MMOL/L — SIGNIFICANT CHANGE UP (ref 22–31)
CO2 SERPL-SCNC: 28 MMOL/L — SIGNIFICANT CHANGE UP (ref 22–31)
CO2 SERPL-SCNC: 29 MMOL/L — SIGNIFICANT CHANGE UP (ref 22–31)
CREAT SERPL-MCNC: 0.53 MG/DL — SIGNIFICANT CHANGE UP (ref 0.5–1.3)
CREAT SERPL-MCNC: 0.61 MG/DL — SIGNIFICANT CHANGE UP (ref 0.5–1.3)
CREAT SERPL-MCNC: 0.78 MG/DL — SIGNIFICANT CHANGE UP (ref 0.5–1.3)
EOSINOPHIL # BLD AUTO: 0.1 K/UL — SIGNIFICANT CHANGE UP (ref 0–0.5)
EOSINOPHIL NFR BLD AUTO: 0.8 % — SIGNIFICANT CHANGE UP (ref 0–6)
ETHANOL SERPL-MCNC: 156 MG/DL — HIGH (ref 0–3)
GLUCOSE SERPL-MCNC: 108 MG/DL — HIGH (ref 70–99)
GLUCOSE SERPL-MCNC: 125 MG/DL — HIGH (ref 70–99)
GLUCOSE SERPL-MCNC: 130 MG/DL — HIGH (ref 70–99)
HBA1C BLD-MCNC: 5.7 % — HIGH (ref 4–5.6)
HCT VFR BLD CALC: 39.1 % — SIGNIFICANT CHANGE UP (ref 34.5–45)
HCT VFR BLD CALC: 41.4 % — SIGNIFICANT CHANGE UP (ref 34.5–45)
HCV AB S/CO SERPL IA: 0.15 S/CO — SIGNIFICANT CHANGE UP (ref 0–0.99)
HCV AB SERPL-IMP: SIGNIFICANT CHANGE UP
HGB BLD-MCNC: 12.8 G/DL — SIGNIFICANT CHANGE UP (ref 11.5–15.5)
HGB BLD-MCNC: 14.4 G/DL — SIGNIFICANT CHANGE UP (ref 11.5–15.5)
LYMPHOCYTES # BLD AUTO: 1.4 K/UL — SIGNIFICANT CHANGE UP (ref 1–3.3)
LYMPHOCYTES # BLD AUTO: 16.4 % — SIGNIFICANT CHANGE UP (ref 13–44)
MAGNESIUM SERPL-MCNC: 1 MG/DL — CRITICAL LOW (ref 1.6–2.6)
MAGNESIUM SERPL-MCNC: 1.9 MG/DL — SIGNIFICANT CHANGE UP (ref 1.6–2.6)
MAGNESIUM SERPL-MCNC: 1.9 MG/DL — SIGNIFICANT CHANGE UP (ref 1.6–2.6)
MCHC RBC-ENTMCNC: 32.8 GM/DL — SIGNIFICANT CHANGE UP (ref 32–36)
MCHC RBC-ENTMCNC: 33 PG — SIGNIFICANT CHANGE UP (ref 27–34)
MCHC RBC-ENTMCNC: 34.1 PG — HIGH (ref 27–34)
MCHC RBC-ENTMCNC: 34.8 GM/DL — SIGNIFICANT CHANGE UP (ref 32–36)
MCV RBC AUTO: 100.4 FL — HIGH (ref 80–100)
MCV RBC AUTO: 97.9 FL — SIGNIFICANT CHANGE UP (ref 80–100)
MONOCYTES # BLD AUTO: 0.7 K/UL — SIGNIFICANT CHANGE UP (ref 0–0.9)
MONOCYTES NFR BLD AUTO: 7.9 % — SIGNIFICANT CHANGE UP (ref 1–9)
NEUTROPHILS # BLD AUTO: 6.2 K/UL — SIGNIFICANT CHANGE UP (ref 1.8–7.4)
NEUTROPHILS NFR BLD AUTO: 73.2 % — SIGNIFICANT CHANGE UP (ref 43–77)
PHOSPHATE SERPL-MCNC: 3.1 MG/DL — SIGNIFICANT CHANGE UP (ref 2.5–4.5)
PLATELET # BLD AUTO: 237 K/UL — SIGNIFICANT CHANGE UP (ref 150–400)
PLATELET # BLD AUTO: 268 K/UL — SIGNIFICANT CHANGE UP (ref 150–400)
POTASSIUM SERPL-MCNC: 5.2 MMOL/L — SIGNIFICANT CHANGE UP (ref 3.5–5.3)
POTASSIUM SERPL-MCNC: 5.4 MMOL/L — HIGH (ref 3.5–5.3)
POTASSIUM SERPL-MCNC: 5.6 MMOL/L — HIGH (ref 3.5–5.3)
POTASSIUM SERPL-MCNC: 5.7 MMOL/L — HIGH (ref 3.5–5.3)
POTASSIUM SERPL-SCNC: 5.2 MMOL/L — SIGNIFICANT CHANGE UP (ref 3.5–5.3)
POTASSIUM SERPL-SCNC: 5.4 MMOL/L — HIGH (ref 3.5–5.3)
POTASSIUM SERPL-SCNC: 5.6 MMOL/L — HIGH (ref 3.5–5.3)
POTASSIUM SERPL-SCNC: 5.7 MMOL/L — HIGH (ref 3.5–5.3)
PROT SERPL-MCNC: 6.1 G/DL — SIGNIFICANT CHANGE UP (ref 6–8.3)
PROT SERPL-MCNC: 6.4 G/DL — SIGNIFICANT CHANGE UP (ref 6–8.3)
PROT SERPL-MCNC: 7.4 G/DL — SIGNIFICANT CHANGE UP (ref 6–8.3)
RBC # BLD: 3.89 M/UL — SIGNIFICANT CHANGE UP (ref 3.8–5.2)
RBC # BLD: 4.23 M/UL — SIGNIFICANT CHANGE UP (ref 3.8–5.2)
RBC # FLD: 12.4 % — SIGNIFICANT CHANGE UP (ref 10.3–14.5)
RBC # FLD: 12.4 % — SIGNIFICANT CHANGE UP (ref 10.3–14.5)
SODIUM SERPL-SCNC: 130 MMOL/L — LOW (ref 135–145)
SODIUM SERPL-SCNC: 131 MMOL/L — LOW (ref 135–145)
SODIUM SERPL-SCNC: 132 MMOL/L — LOW (ref 135–145)
TROPONIN I SERPL-MCNC: <.017 NG/ML — LOW (ref 0.02–0.06)
TROPONIN I SERPL-MCNC: <.017 NG/ML — LOW (ref 0.02–0.06)
WBC # BLD: 7.2 K/UL — SIGNIFICANT CHANGE UP (ref 3.8–10.5)
WBC # BLD: 8.5 K/UL — SIGNIFICANT CHANGE UP (ref 3.8–10.5)
WBC # FLD AUTO: 7.2 K/UL — SIGNIFICANT CHANGE UP (ref 3.8–10.5)
WBC # FLD AUTO: 8.5 K/UL — SIGNIFICANT CHANGE UP (ref 3.8–10.5)

## 2019-04-05 PROCEDURE — 72125 CT NECK SPINE W/O DYE: CPT | Mod: 26

## 2019-04-05 PROCEDURE — 99223 1ST HOSP IP/OBS HIGH 75: CPT

## 2019-04-05 PROCEDURE — 93010 ELECTROCARDIOGRAM REPORT: CPT

## 2019-04-05 PROCEDURE — 72131 CT LUMBAR SPINE W/O DYE: CPT | Mod: 26

## 2019-04-05 PROCEDURE — 70450 CT HEAD/BRAIN W/O DYE: CPT | Mod: 26

## 2019-04-05 PROCEDURE — 71045 X-RAY EXAM CHEST 1 VIEW: CPT | Mod: 26

## 2019-04-05 PROCEDURE — 99285 EMERGENCY DEPT VISIT HI MDM: CPT

## 2019-04-05 PROCEDURE — 99222 1ST HOSP IP/OBS MODERATE 55: CPT

## 2019-04-05 RX ORDER — SODIUM CHLORIDE 9 MG/ML
1000 INJECTION, SOLUTION INTRAVENOUS
Qty: 0 | Refills: 0 | Status: DISCONTINUED | OUTPATIENT
Start: 2019-04-05 | End: 2019-04-12

## 2019-04-05 RX ORDER — LOSARTAN POTASSIUM 100 MG/1
50 TABLET, FILM COATED ORAL DAILY
Qty: 0 | Refills: 0 | Status: DISCONTINUED | OUTPATIENT
Start: 2019-04-05 | End: 2019-04-09

## 2019-04-05 RX ORDER — INSULIN LISPRO 100/ML
VIAL (ML) SUBCUTANEOUS
Qty: 0 | Refills: 0 | Status: DISCONTINUED | OUTPATIENT
Start: 2019-04-05 | End: 2019-04-07

## 2019-04-05 RX ORDER — POLYETHYLENE GLYCOL 3350 17 G/17G
17 POWDER, FOR SOLUTION ORAL DAILY
Qty: 0 | Refills: 0 | Status: DISCONTINUED | OUTPATIENT
Start: 2019-04-05 | End: 2019-04-09

## 2019-04-05 RX ORDER — BUDESONIDE, MICRONIZED 100 %
0.25 POWDER (GRAM) MISCELLANEOUS
Qty: 0 | Refills: 0 | Status: COMPLETED | OUTPATIENT
Start: 2019-04-05 | End: 2019-04-08

## 2019-04-05 RX ORDER — TIOTROPIUM BROMIDE 18 UG/1
1 CAPSULE ORAL; RESPIRATORY (INHALATION) DAILY
Qty: 0 | Refills: 0 | Status: DISCONTINUED | OUTPATIENT
Start: 2019-04-05 | End: 2019-04-12

## 2019-04-05 RX ORDER — ALBUTEROL 90 UG/1
1 AEROSOL, METERED ORAL EVERY 4 HOURS
Qty: 0 | Refills: 0 | Status: DISCONTINUED | OUTPATIENT
Start: 2019-04-05 | End: 2019-04-12

## 2019-04-05 RX ORDER — OXYCODONE HYDROCHLORIDE 5 MG/1
10 TABLET ORAL ONCE
Qty: 0 | Refills: 0 | Status: DISCONTINUED | OUTPATIENT
Start: 2019-04-05 | End: 2019-04-05

## 2019-04-05 RX ORDER — ONDANSETRON 8 MG/1
4 TABLET, FILM COATED ORAL ONCE
Qty: 0 | Refills: 0 | Status: COMPLETED | OUTPATIENT
Start: 2019-04-05 | End: 2019-04-05

## 2019-04-05 RX ORDER — DEXTROSE 50 % IN WATER 50 %
25 SYRINGE (ML) INTRAVENOUS ONCE
Qty: 0 | Refills: 0 | Status: DISCONTINUED | OUTPATIENT
Start: 2019-04-05 | End: 2019-04-12

## 2019-04-05 RX ORDER — ATORVASTATIN CALCIUM 80 MG/1
20 TABLET, FILM COATED ORAL AT BEDTIME
Qty: 0 | Refills: 0 | Status: DISCONTINUED | OUTPATIENT
Start: 2019-04-05 | End: 2019-04-12

## 2019-04-05 RX ORDER — SODIUM CHLORIDE 9 MG/ML
1000 INJECTION INTRAMUSCULAR; INTRAVENOUS; SUBCUTANEOUS
Qty: 0 | Refills: 0 | Status: DISCONTINUED | OUTPATIENT
Start: 2019-04-05 | End: 2019-04-06

## 2019-04-05 RX ORDER — INSULIN LISPRO 100/ML
VIAL (ML) SUBCUTANEOUS AT BEDTIME
Qty: 0 | Refills: 0 | Status: DISCONTINUED | OUTPATIENT
Start: 2019-04-05 | End: 2019-04-07

## 2019-04-05 RX ORDER — DEXTROSE 50 % IN WATER 50 %
15 SYRINGE (ML) INTRAVENOUS ONCE
Qty: 0 | Refills: 0 | Status: DISCONTINUED | OUTPATIENT
Start: 2019-04-05 | End: 2019-04-12

## 2019-04-05 RX ORDER — MORPHINE SULFATE 50 MG/1
4 CAPSULE, EXTENDED RELEASE ORAL ONCE
Qty: 0 | Refills: 0 | Status: DISCONTINUED | OUTPATIENT
Start: 2019-04-05 | End: 2019-04-05

## 2019-04-05 RX ORDER — CLOPIDOGREL BISULFATE 75 MG/1
1 TABLET, FILM COATED ORAL
Qty: 0 | Refills: 0 | COMMUNITY

## 2019-04-05 RX ORDER — OXYCODONE HYDROCHLORIDE 5 MG/1
5 TABLET ORAL EVERY 4 HOURS
Qty: 0 | Refills: 0 | Status: DISCONTINUED | OUTPATIENT
Start: 2019-04-05 | End: 2019-04-11

## 2019-04-05 RX ORDER — SODIUM CHLORIDE 9 MG/ML
3 INJECTION INTRAMUSCULAR; INTRAVENOUS; SUBCUTANEOUS ONCE
Qty: 0 | Refills: 0 | Status: COMPLETED | OUTPATIENT
Start: 2019-04-05 | End: 2019-04-05

## 2019-04-05 RX ORDER — ENOXAPARIN SODIUM 100 MG/ML
40 INJECTION SUBCUTANEOUS EVERY 24 HOURS
Qty: 0 | Refills: 0 | Status: DISCONTINUED | OUTPATIENT
Start: 2019-04-05 | End: 2019-04-12

## 2019-04-05 RX ORDER — ONDANSETRON 8 MG/1
4 TABLET, FILM COATED ORAL EVERY 8 HOURS
Qty: 0 | Refills: 0 | Status: DISCONTINUED | OUTPATIENT
Start: 2019-04-05 | End: 2019-04-12

## 2019-04-05 RX ORDER — SENNA PLUS 8.6 MG/1
2 TABLET ORAL AT BEDTIME
Qty: 0 | Refills: 0 | Status: DISCONTINUED | OUTPATIENT
Start: 2019-04-05 | End: 2019-04-12

## 2019-04-05 RX ORDER — IPRATROPIUM/ALBUTEROL SULFATE 18-103MCG
3 AEROSOL WITH ADAPTER (GRAM) INHALATION EVERY 4 HOURS
Qty: 0 | Refills: 0 | Status: DISCONTINUED | OUTPATIENT
Start: 2019-04-05 | End: 2019-04-07

## 2019-04-05 RX ORDER — SIMVASTATIN 20 MG/1
40 TABLET, FILM COATED ORAL AT BEDTIME
Qty: 0 | Refills: 0 | Status: DISCONTINUED | OUTPATIENT
Start: 2019-04-05 | End: 2019-04-05

## 2019-04-05 RX ORDER — PANTOPRAZOLE SODIUM 20 MG/1
40 TABLET, DELAYED RELEASE ORAL
Qty: 0 | Refills: 0 | Status: DISCONTINUED | OUTPATIENT
Start: 2019-04-05 | End: 2019-04-12

## 2019-04-05 RX ORDER — DEXTROSE 50 % IN WATER 50 %
12.5 SYRINGE (ML) INTRAVENOUS ONCE
Qty: 0 | Refills: 0 | Status: DISCONTINUED | OUTPATIENT
Start: 2019-04-05 | End: 2019-04-12

## 2019-04-05 RX ORDER — MORPHINE SULFATE 50 MG/1
2 CAPSULE, EXTENDED RELEASE ORAL EVERY 8 HOURS
Qty: 0 | Refills: 0 | Status: DISCONTINUED | OUTPATIENT
Start: 2019-04-05 | End: 2019-04-11

## 2019-04-05 RX ORDER — FOLIC ACID 0.8 MG
1 TABLET ORAL DAILY
Qty: 0 | Refills: 0 | Status: DISCONTINUED | OUTPATIENT
Start: 2019-04-05 | End: 2019-04-12

## 2019-04-05 RX ORDER — GLUCAGON INJECTION, SOLUTION 0.5 MG/.1ML
1 INJECTION, SOLUTION SUBCUTANEOUS ONCE
Qty: 0 | Refills: 0 | Status: DISCONTINUED | OUTPATIENT
Start: 2019-04-05 | End: 2019-04-12

## 2019-04-05 RX ORDER — ACETAMINOPHEN 500 MG
650 TABLET ORAL EVERY 6 HOURS
Qty: 0 | Refills: 0 | Status: DISCONTINUED | OUTPATIENT
Start: 2019-04-05 | End: 2019-04-12

## 2019-04-05 RX ORDER — MAGNESIUM SULFATE 500 MG/ML
1 VIAL (ML) INJECTION
Qty: 0 | Refills: 0 | Status: COMPLETED | OUTPATIENT
Start: 2019-04-05 | End: 2019-04-05

## 2019-04-05 RX ORDER — THIAMINE MONONITRATE (VIT B1) 100 MG
100 TABLET ORAL DAILY
Qty: 0 | Refills: 0 | Status: COMPLETED | OUTPATIENT
Start: 2019-04-05 | End: 2019-04-07

## 2019-04-05 RX ORDER — MAGNESIUM OXIDE 400 MG ORAL TABLET 241.3 MG
400 TABLET ORAL DAILY
Qty: 0 | Refills: 0 | Status: DISCONTINUED | OUTPATIENT
Start: 2019-04-05 | End: 2019-04-12

## 2019-04-05 RX ORDER — SODIUM CHLORIDE 9 MG/ML
1000 INJECTION INTRAMUSCULAR; INTRAVENOUS; SUBCUTANEOUS ONCE
Qty: 0 | Refills: 0 | Status: COMPLETED | OUTPATIENT
Start: 2019-04-05 | End: 2019-04-05

## 2019-04-05 RX ORDER — METOPROLOL TARTRATE 50 MG
100 TABLET ORAL DAILY
Qty: 0 | Refills: 0 | Status: DISCONTINUED | OUTPATIENT
Start: 2019-04-05 | End: 2019-04-12

## 2019-04-05 RX ADMIN — OXYCODONE HYDROCHLORIDE 5 MILLIGRAM(S): 5 TABLET ORAL at 13:33

## 2019-04-05 RX ADMIN — Medication 100 MILLIGRAM(S): at 06:11

## 2019-04-05 RX ADMIN — OXYCODONE HYDROCHLORIDE 5 MILLIGRAM(S): 5 TABLET ORAL at 06:11

## 2019-04-05 RX ADMIN — MORPHINE SULFATE 4 MILLIGRAM(S): 50 CAPSULE, EXTENDED RELEASE ORAL at 02:30

## 2019-04-05 RX ADMIN — OXYCODONE HYDROCHLORIDE 5 MILLIGRAM(S): 5 TABLET ORAL at 06:55

## 2019-04-05 RX ADMIN — ATORVASTATIN CALCIUM 20 MILLIGRAM(S): 80 TABLET, FILM COATED ORAL at 20:26

## 2019-04-05 RX ADMIN — PANTOPRAZOLE SODIUM 40 MILLIGRAM(S): 20 TABLET, DELAYED RELEASE ORAL at 06:11

## 2019-04-05 RX ADMIN — SENNA PLUS 2 TABLET(S): 8.6 TABLET ORAL at 20:26

## 2019-04-05 RX ADMIN — OXYCODONE HYDROCHLORIDE 10 MILLIGRAM(S): 5 TABLET ORAL at 00:03

## 2019-04-05 RX ADMIN — Medication 100 MILLIGRAM(S): at 14:47

## 2019-04-05 RX ADMIN — ONDANSETRON 4 MILLIGRAM(S): 8 TABLET, FILM COATED ORAL at 08:28

## 2019-04-05 RX ADMIN — OXYCODONE HYDROCHLORIDE 10 MILLIGRAM(S): 5 TABLET ORAL at 03:07

## 2019-04-05 RX ADMIN — Medication 1 MILLIGRAM(S): at 14:47

## 2019-04-05 RX ADMIN — SODIUM CHLORIDE 1000 MILLILITER(S): 9 INJECTION INTRAMUSCULAR; INTRAVENOUS; SUBCUTANEOUS at 02:12

## 2019-04-05 RX ADMIN — MORPHINE SULFATE 2 MILLIGRAM(S): 50 CAPSULE, EXTENDED RELEASE ORAL at 21:30

## 2019-04-05 RX ADMIN — SODIUM CHLORIDE 1000 MILLILITER(S): 9 INJECTION INTRAMUSCULAR; INTRAVENOUS; SUBCUTANEOUS at 01:12

## 2019-04-05 RX ADMIN — Medication 3 MILLILITER(S): at 22:08

## 2019-04-05 RX ADMIN — OXYCODONE HYDROCHLORIDE 5 MILLIGRAM(S): 5 TABLET ORAL at 12:19

## 2019-04-05 RX ADMIN — MAGNESIUM OXIDE 400 MG ORAL TABLET 400 MILLIGRAM(S): 241.3 TABLET ORAL at 14:47

## 2019-04-05 RX ADMIN — MORPHINE SULFATE 2 MILLIGRAM(S): 50 CAPSULE, EXTENDED RELEASE ORAL at 20:26

## 2019-04-05 RX ADMIN — OXYCODONE HYDROCHLORIDE 5 MILLIGRAM(S): 5 TABLET ORAL at 17:16

## 2019-04-05 RX ADMIN — OXYCODONE HYDROCHLORIDE 5 MILLIGRAM(S): 5 TABLET ORAL at 23:26

## 2019-04-05 RX ADMIN — Medication 1 TABLET(S): at 14:48

## 2019-04-05 RX ADMIN — Medication 100 GRAM(S): at 10:00

## 2019-04-05 RX ADMIN — LOSARTAN POTASSIUM 50 MILLIGRAM(S): 100 TABLET, FILM COATED ORAL at 06:11

## 2019-04-05 RX ADMIN — SODIUM CHLORIDE 3 MILLILITER(S): 9 INJECTION INTRAMUSCULAR; INTRAVENOUS; SUBCUTANEOUS at 00:30

## 2019-04-05 RX ADMIN — Medication 100 GRAM(S): at 11:32

## 2019-04-05 RX ADMIN — ENOXAPARIN SODIUM 40 MILLIGRAM(S): 100 INJECTION SUBCUTANEOUS at 12:19

## 2019-04-05 RX ADMIN — SODIUM CHLORIDE 70 MILLILITER(S): 9 INJECTION INTRAMUSCULAR; INTRAVENOUS; SUBCUTANEOUS at 05:30

## 2019-04-05 RX ADMIN — SODIUM CHLORIDE 1000 MILLILITER(S): 9 INJECTION INTRAMUSCULAR; INTRAVENOUS; SUBCUTANEOUS at 00:26

## 2019-04-05 RX ADMIN — ONDANSETRON 4 MILLIGRAM(S): 8 TABLET, FILM COATED ORAL at 08:29

## 2019-04-05 RX ADMIN — MORPHINE SULFATE 4 MILLIGRAM(S): 50 CAPSULE, EXTENDED RELEASE ORAL at 01:20

## 2019-04-05 NOTE — ED PROVIDER NOTE - PHYSICAL EXAMINATION
Gen:  alert, awake, no acute distress  HEENT:  atraumatic head, airway clear, pupils equal and round  CV:  rrr, nl S1, S2, no m/r/g  Pulm:  lungs CTA b/l  Abd: s/nt/nd, +BS  MSK/Ext:  moving all extremities, easily sits up, no spinal ttp  Neuro:  grossly intact, no focal deficits  Skin:  clear, dry, intact  Psych: AOx3, normal affect, no apparent risk to self or others

## 2019-04-05 NOTE — H&P ADULT - NSHPREVIEWOFSYSTEMS_GEN_ALL_CORE
CONSTITUTIONAL: No fever, weight loss, or fatigue  EYES: No eye pain, visual disturbances, or discharge  ENMT:  No difficulty hearing, tinnitus, vertigo; No sinus or throat pain  NECK: No pain or stiffness  RESPIRATORY: No cough, wheezing, chills or hemoptysis; No shortness of breath  CARDIOVASCULAR: No chest pain, palpitations, dizziness, or leg swelling  GASTROINTESTINAL: No abdominal or epigastric pain. No nausea, vomiting, or hematemesis; No diarrhea or constipation. No melena or hematochezia.  GENITOURINARY: No dysuria, frequency, hematuria, or incontinence  NEUROLOGICAL: No headaches, memory loss, loss of strength, numbness, or tremors  SKIN: No itching, burning, rashes, or lesions   LYMPH NODES: No enlarged glands  ENDOCRINE: No heat or cold intolerance; No hair loss  MUSCULOSKELETAL: BACK pain as per HPI  PSYCHIATRIC: No depression, anxiety, mood swings, or difficulty sleeping  HEME/LYMPH: No easy bruising, or bleeding gums  ALLERY AND IMMUNOLOGIC: No hives or eczema    IMPROVE VTE Individual Risk Assessment          RISK                                                          Points  [  ] Previous VTE                                                3  [  ] Thrombophilia                                             2  [  ] Lower limb paralysis                                   2        (unable to hold up >15 seconds)    [  ] Current Cancer                                             2         (within 6 months)  [ 1 ] Immobilization > 24 hrs                              1  [  ] ICU/CCU stay > 24 hours                             1  [  1] Age > 60                                                         1    IMPROVE VTE Score:         [    2     ]    Total Risk Factor Score:    0 - 1:   Consider IPC  >2 - 3:  Thromboprophylaxis required (enoxaparin or SQ heparin)        >4:   High Risk: Thromboprophylaxis required (enoxaparin or SQ heparin), optional add IPC  **If CONTRAINDICATION to enoxaparin or SQ heparin, USE IPCs**

## 2019-04-05 NOTE — H&P ADULT - ASSESSMENT
74F hx of ETOH use, CAD/CABGx4, AAA repair, HTN, HLD, T2DM pw s/p mechanical fall with L2 vertebral fx.     # Vertebral fx  Analgesics, bowel regimen  DVT/GI proph    # ETOH use.  CIWA and sx trigger rx.  check lytes, CPK  MVI, FA, thiamine  IVFs    #Hyponatremia  appears dehydrated  IVFs    # CAD  cont asa, losartan for now.   VERIFY meds with GC  or  in am.     # T2DM  ISS

## 2019-04-05 NOTE — H&P ADULT - NSHPPHYSICALEXAM_GEN_ALL_CORE
Vital Signs Last 24 Hrs  T(C): 36.3 (05 Apr 2019 00:03), Max: 36.3 (05 Apr 2019 00:03)  T(F): 97.3 (05 Apr 2019 00:03), Max: 97.3 (05 Apr 2019 00:03)  HR: 73 (05 Apr 2019 03:02) (73 - 76)  BP: 164/73 (05 Apr 2019 03:02) (164/73 - 184/105)  BP(mean): --  RR: 18 (05 Apr 2019 03:02) (18 - 18)  SpO2: 95% (05 Apr 2019 03:02) (95% - 95%)  Daily Height in cm: 162.56 (05 Apr 2019 00:03)    Daily   CAPILLARY BLOOD GLUCOSE        I&O's Summary      GENERAL: NAD, +ETOH on breath  HEAD:  Normocephalic  EYES: EOMI, PERRLA, conjunctiva and sclera clear  ENMT: No tonsillar erythema, exudates, or enlargement; DRY mucous membranes, No lesions  NECK: Supple, No JVD, no bruit, normal thyroid  NERVOUS SYSTEM:  Alert & Oriented X3, grossly  moves all fours, no tremors.   CHEST/LUNG: Clear to auscultation bilaterally; No rales, rhonchi, wheezing, or rubs  HEART: Regular rate and rhythm; +systolic murmurs, rubs, or gallops  ABDOMEN: Soft, Nontender, Nondistended; Bowel sounds present  EXTREMITIES:  2+ Peripheral Pulses, No clubbing, cyanosis, trace bl LE edema. +vertebral tenderness.   LYMPH: No lymphadenopathy noted  SKIN: No rashes or lesions

## 2019-04-05 NOTE — CHART NOTE - NSCHARTNOTEFT_GEN_A_CORE
PA FOLLOW UP NOTE    74F hx of ETOH use, CAD/CABGx4, AAA repair, HTN, HLD, T2DM pw s/p mechanical fall with L2 vertebral fx.     # Vertebral fx  Analgesics, bowel regimen  Ortho consulted  DVT/GI proph  Appreciate PT eval  Apti contact from the orthotist for lumbar brace--rx and face sheet faxed to [phone]    # ETOH use.  CIWA and sx trigger rx.  check lytes, CPK  MVI, FA, thiamine  IVFs    #Hyponatremia  appears dehydrated  Cont IVF    #HypoMg:  Mg 1.0 today  repleted  Repeat CMP pending    # CAD  cont asa,, losartan and metop  meds verified w GC     # T2DM  ISS    D PA FOLLOW UP NOTE    74F hx of ETOH use, CAD/CABGx4, AAA repair, HTN, HLD, T2DM pw s/p mechanical fall with L2 vertebral fx.     # Vertebral fx  Analgesics, bowel regimen  Ortho consulted  DVT/GI proph  Appreciate PT eval  Pati contact from the orthotist for lumbar brace--rx and face sheet faxed to [phone]    # ETOH use.  CIWA and sx trigger rx.  check lytes, CPK  MVI, FA, thiamine  IVFs    #Hyponatremia  appears dehydrated  Cont IVF    #HypoMg:  Mg 1.0 today  repleted  Repeat CMP pending    # CAD  cont asa,, losartan and metop  meds verified w GC     # T2DM  ISS    Dispo: On CIWA and awaiting PT eval

## 2019-04-05 NOTE — H&P ADULT - NSHPLABSRESULTS_GEN_ALL_CORE
14.4   8.5   )-----------( 268      ( 05 Apr 2019 00:30 )             41.4       04-05    130<L>  |  93<L>  |  12  ----------------------------<  108<H>  5.7<H>   |  28  |  0.61    Ca    8.7      05 Apr 2019 00:30    TPro  7.4  /  Alb  3.6  /  TBili  0.3  /  DBili  x   /  AST  31  /  ALT  23  /  AlkPhos  66  04-05         LIVER FUNCTIONS - ( 05 Apr 2019 00:30 )  Alb: 3.6 g/dL / Pro: 7.4 g/dL / ALK PHOS: 66 U/L / ALT: 23 U/L DA / AST: 31 U/L / GGT: x                   CARDIAC MARKERS ( 05 Apr 2019 00:30 )  <.017 ng/mL / x     / x     / x     / x              EKG: NSR at 89bpm, RBBB, q II, III Avf      CXR: wet read - Scarring in R upper lung    < from: CT Lumbar Spine No Cont (04.05.19 @ 01:01) >    IMPRESSION:    New fracture lucency through L2 extending through the anterior and middle   column to the posterior cortex with minimal bony retropulsion. Mild   vertical loss of height of the L2 vertebral body.    Chronic mild compression fractureof the L1 vertebral body.    Lumbar spondylosis, as described.      < end of copied text >    < from: CT Cervical Spine No Cont (04.05.19 @ 00:59) >    IMPRESSION:    Evaluation degraded by mild motion, beam hardening artifact, and streak   artifact. No gross acute cervical spine fracture or evidence of traumatic   malalignment. Multilevel cervical spondylosis.    < end of copied text >    < from: CT Head No Cont (04.05.19 @ 00:59) >    IMPRESSION:    No acute intracranial hemorrhage, mass effect, or acute osseous fracture.   No change from prior exam from 9/29/2018.    < end of copied text >
impairments found

## 2019-04-05 NOTE — ED ADULT NURSE REASSESSMENT NOTE - NS ED NURSE REASSESS COMMENT FT1
broght in by ambulance c/o back pain ,s/p fall, no LOC,had wine, aox3, evaluated by md corona with orders, bloods drawn ,normal saline on flow.

## 2019-04-05 NOTE — CONSULT NOTE ADULT - SUBJECTIVE AND OBJECTIVE BOX
DMITRI MOHAN      74y Female with hx of ETOH use, CAD/CABGx4, AAA repair, HTN, HLD, T2DM pw s/p fall. Pt was in the kitchen and tripped over uneven floor and landed on her back. Denied any head injury or LOC. Denied any antecedent HA, dizziness, palps, diaphoresis, chest pain prior to fall. Denied any recent fevers, chills, cough, NVD, dysuria. She complains mainly of low back pain with no radicular sxs to the legs, denied any other injury      PAST MEDICAL HISTORY:                                                                                                                             Alcohol abuse   Asthma   DM (diabetes mellitus)   HTN (hypertension).     PAST SURGICAL HISTORY:    H/O right shoulder hemiarthroplasty  S/P CABG x 4.   AAA repair        T(F): 99  HR: 97  BP: 152/79  RR: 15  SpO2: 93%                        12.8   7.2   )-----------( 237      ( 05 Apr 2019 06:16 )             39.1                     04-05    131<L>  |  97  |  14  ----------------------------<  130<H>  5.2   |  29  |  0.78    Ca    7.8<L>      05 Apr 2019 15:20  Phos  3.1     04-05  Mg     1.9     04-05    TPro  6.1  /  Alb  3.0<L>  /  TBili  0.6  /  DBili  x   /  AST  23  /  ALT  18  /  AlkPhos  62  04-05      Physical Exam :    -   Lower back with skin C/D/I, tender to palpation diffusely about the lumbar spine.   -   Distal Neurvascular status intact grossly.   -   Warm well perfused; capillary refill <3 seconds   -   (+)B/L Hip flexor/quad/TA/EHL/FHL 5/5  -   Patellar and achilles tendon reflexes intact  -   (+) Sensation to light touch  -   (-) Calf tenderness Bilaterally    EXAM: CT LUMBAR SPINE     PROCEDURE DATE: 04/05/2019       INTERPRETATION: CT of the lumbar spine dated 4/5/2019.     CLINICAL INFORMATION: Back pain status post fall.     TECHNIQUE: Thin section axial CT images are obtained through the lumbar   spine with reformatted images in the sagittal and coronal planes.     The study is correlated with a CT the abdomen and pelvis from 10/7/2018.     FINDINGS:     The bones are diffusely demineralized. There are 5 nonrib-bearing   lumbar-type vertebral bodies. The lumbar lordosis is maintained. There is a   chronic mild compression fracture of the L1 vertebral body. There is new   fracture lucency through through L2 extending through the anterior and   middle column to the posterior cortex with minimal bony retropulsion. There   is mild vertical loss of height of the L2 vertebral body. No other acute   fracture is identified. The lumbar vertebral elements are well aligned.   There is no significant paravertebral hematoma. There is intervertebral disc   space narrowing predominantly at L5-S1. There are multilevel mild disc   bulges and facet and ligamentous hypertrophy resulting in mild to moderate   segmental canal stenosis at L2-L3 and L3-L4. There is mild left-sided neural   foraminal narrowing at L3-L4 secondary to a lateralized disc bulge and facet   hypertrophy. There is again noted an abdominal aortic aneurysm with   aortobiiliac stent graft. The aneurysm sac is without significant interval   change. There are colonic diverticula.     IMPRESSION:     New fracture lucency through L2 extending through the anterior and middle   column to the posterior cortex with minimal bony retropulsion. Mild vertical   loss of height of the L2 vertebral body.     Chronic mild compression fracture of the L1 vertebral body.     Lumbar spondylosis, as described.                   DAHLIA HARRINGTON   This document has been electronically signed. Apr 5 2019 2:13AM

## 2019-04-05 NOTE — ED PROVIDER NOTE - CARE PLAN
Principal Discharge DX:	Back pain  Secondary Diagnosis:	Alcohol abuse Principal Discharge DX:	Vertebral compression fracture  Secondary Diagnosis:	Alcohol abuse

## 2019-04-05 NOTE — H&P ADULT - HISTORY OF PRESENT ILLNESS
74F hx of ETOH use, CAD/CABGx4, AAA repair, HTN, HLD, T2DM pw s/p fall. Pt was in the kitchen and tripped over uneven floor and landed on her back. Denied any head injury or LOC. Denied any antecedent HA, dizziness, palps, diaphoresis, chest pain prior to fall. Denied any recent fevers, chills, cough, NVD, dysuria. She complains mainly of low back pain with no radicular sxs to the legs, denied any other injury    Admits to drinking 3-4 glasses of wine every evening. Denied any hx of ETOH withdrawal/DTs or tremors in AM or any ETOH withdrawal related hospitalizations.     Unable to recall list of meds but related that she had not seen her cardiologist >6months and has no PMD. Meds had not changed much since her last admission in 9/2018 but does not take Lasix or Plavix any longer.   Pharmacy is [place] .   Left Message with .

## 2019-04-05 NOTE — ED ADULT NURSE NOTE - NSIMPLEMENTINTERV_GEN_ALL_ED
Implemented All Universal Safety Interventions:  [place] to call system. Call bell, personal items and telephone within reach. Instruct patient to call for assistance. Room bathroom lighting operational. Non-slip footwear when patient is off stretcher. Physically safe environment: no spills, clutter or unnecessary equipment. Stretcher in lowest position, wheels locked, appropriate side rails in place.

## 2019-04-05 NOTE — ED PROVIDER NOTE - OBJECTIVE STATEMENT
pt states that she was having a few drinks tonight and fell, she could not explain completely how she fell and the states that she tried to get up and slipped and feel onto her back. pt denies LOC, states that she has low back pain, denies head trauma.  reports drinking fairly regularly.

## 2019-04-06 DIAGNOSIS — I25.10 ATHEROSCLEROTIC HEART DISEASE OF NATIVE CORONARY ARTERY WITHOUT ANGINA PECTORIS: ICD-10-CM

## 2019-04-06 DIAGNOSIS — M48.50XA COLLAPSED VERTEBRA, NOT ELSEWHERE CLASSIFIED, SITE UNSPECIFIED, INITIAL ENCOUNTER FOR FRACTURE: ICD-10-CM

## 2019-04-06 DIAGNOSIS — R09.02 HYPOXEMIA: ICD-10-CM

## 2019-04-06 DIAGNOSIS — E11.8 TYPE 2 DIABETES MELLITUS WITH UNSPECIFIED COMPLICATIONS: ICD-10-CM

## 2019-04-06 DIAGNOSIS — E87.1 HYPO-OSMOLALITY AND HYPONATREMIA: ICD-10-CM

## 2019-04-06 DIAGNOSIS — F10.10 ALCOHOL ABUSE, UNCOMPLICATED: ICD-10-CM

## 2019-04-06 LAB
ALBUMIN SERPL ELPH-MCNC: 3.1 G/DL — LOW (ref 3.3–5)
ALP SERPL-CCNC: 62 U/L — SIGNIFICANT CHANGE UP (ref 40–120)
ALT FLD-CCNC: 18 U/L DA — SIGNIFICANT CHANGE UP (ref 10–45)
ANION GAP SERPL CALC-SCNC: 9 MMOL/L — SIGNIFICANT CHANGE UP (ref 5–17)
AST SERPL-CCNC: 26 U/L — SIGNIFICANT CHANGE UP (ref 10–40)
BILIRUB SERPL-MCNC: 0.6 MG/DL — SIGNIFICANT CHANGE UP (ref 0.2–1.2)
BUN SERPL-MCNC: 16 MG/DL — SIGNIFICANT CHANGE UP (ref 7–23)
CALCIUM SERPL-MCNC: 8.3 MG/DL — LOW (ref 8.4–10.5)
CHLORIDE SERPL-SCNC: 96 MMOL/L — SIGNIFICANT CHANGE UP (ref 96–108)
CO2 SERPL-SCNC: 26 MMOL/L — SIGNIFICANT CHANGE UP (ref 22–31)
CORTIS AM PEAK SERPL-MCNC: 23.4 UG/DL — HIGH (ref 6–18.4)
CREAT SERPL-MCNC: 0.96 MG/DL — SIGNIFICANT CHANGE UP (ref 0.5–1.3)
GLUCOSE SERPL-MCNC: 160 MG/DL — HIGH (ref 70–99)
HCT VFR BLD CALC: 37.8 % — SIGNIFICANT CHANGE UP (ref 34.5–45)
HGB BLD-MCNC: 12.5 G/DL — SIGNIFICANT CHANGE UP (ref 11.5–15.5)
MAGNESIUM SERPL-MCNC: 2 MG/DL — SIGNIFICANT CHANGE UP (ref 1.6–2.6)
MCHC RBC-ENTMCNC: 33.2 GM/DL — SIGNIFICANT CHANGE UP (ref 32–36)
MCHC RBC-ENTMCNC: 33.7 PG — SIGNIFICANT CHANGE UP (ref 27–34)
MCV RBC AUTO: 101.5 FL — HIGH (ref 80–100)
PLATELET # BLD AUTO: 232 K/UL — SIGNIFICANT CHANGE UP (ref 150–400)
POTASSIUM SERPL-MCNC: 5 MMOL/L — SIGNIFICANT CHANGE UP (ref 3.5–5.3)
POTASSIUM SERPL-SCNC: 5 MMOL/L — SIGNIFICANT CHANGE UP (ref 3.5–5.3)
PROT SERPL-MCNC: 6.5 G/DL — SIGNIFICANT CHANGE UP (ref 6–8.3)
RBC # BLD: 3.72 M/UL — LOW (ref 3.8–5.2)
RBC # FLD: 12.8 % — SIGNIFICANT CHANGE UP (ref 10.3–14.5)
SODIUM SERPL-SCNC: 131 MMOL/L — LOW (ref 135–145)
TSH SERPL-MCNC: 1.79 UIU/ML — SIGNIFICANT CHANGE UP (ref 0.27–4.2)
WBC # BLD: 9.2 K/UL — SIGNIFICANT CHANGE UP (ref 3.8–10.5)
WBC # FLD AUTO: 9.2 K/UL — SIGNIFICANT CHANGE UP (ref 3.8–10.5)

## 2019-04-06 PROCEDURE — 71045 X-RAY EXAM CHEST 1 VIEW: CPT | Mod: 26

## 2019-04-06 PROCEDURE — 99233 SBSQ HOSP IP/OBS HIGH 50: CPT

## 2019-04-06 RX ORDER — IPRATROPIUM/ALBUTEROL SULFATE 18-103MCG
3 AEROSOL WITH ADAPTER (GRAM) INHALATION ONCE
Qty: 0 | Refills: 0 | Status: COMPLETED | OUTPATIENT
Start: 2019-04-06 | End: 2019-04-06

## 2019-04-06 RX ORDER — CHOLECALCIFEROL (VITAMIN D3) 125 MCG
1000 CAPSULE ORAL DAILY
Qty: 0 | Refills: 0 | Status: DISCONTINUED | OUTPATIENT
Start: 2019-04-06 | End: 2019-04-12

## 2019-04-06 RX ADMIN — Medication 1: at 07:39

## 2019-04-06 RX ADMIN — LOSARTAN POTASSIUM 50 MILLIGRAM(S): 100 TABLET, FILM COATED ORAL at 05:24

## 2019-04-06 RX ADMIN — Medication 3 MILLILITER(S): at 10:08

## 2019-04-06 RX ADMIN — Medication 100 MILLIGRAM(S): at 05:24

## 2019-04-06 RX ADMIN — Medication 1 MILLIGRAM(S): at 09:35

## 2019-04-06 RX ADMIN — Medication 0.25 MILLIGRAM(S): at 21:15

## 2019-04-06 RX ADMIN — MAGNESIUM OXIDE 400 MG ORAL TABLET 400 MILLIGRAM(S): 241.3 TABLET ORAL at 09:35

## 2019-04-06 RX ADMIN — OXYCODONE HYDROCHLORIDE 5 MILLIGRAM(S): 5 TABLET ORAL at 21:45

## 2019-04-06 RX ADMIN — OXYCODONE HYDROCHLORIDE 5 MILLIGRAM(S): 5 TABLET ORAL at 00:30

## 2019-04-06 RX ADMIN — OXYCODONE HYDROCHLORIDE 5 MILLIGRAM(S): 5 TABLET ORAL at 05:24

## 2019-04-06 RX ADMIN — OXYCODONE HYDROCHLORIDE 5 MILLIGRAM(S): 5 TABLET ORAL at 22:45

## 2019-04-06 RX ADMIN — ATORVASTATIN CALCIUM 20 MILLIGRAM(S): 80 TABLET, FILM COATED ORAL at 21:44

## 2019-04-06 RX ADMIN — MORPHINE SULFATE 2 MILLIGRAM(S): 50 CAPSULE, EXTENDED RELEASE ORAL at 09:36

## 2019-04-06 RX ADMIN — Medication 100 MILLIGRAM(S): at 09:35

## 2019-04-06 RX ADMIN — MORPHINE SULFATE 2 MILLIGRAM(S): 50 CAPSULE, EXTENDED RELEASE ORAL at 09:50

## 2019-04-06 RX ADMIN — Medication 2: at 12:47

## 2019-04-06 RX ADMIN — Medication 3 MILLILITER(S): at 21:15

## 2019-04-06 RX ADMIN — Medication 0.25 MILLIGRAM(S): at 10:02

## 2019-04-06 RX ADMIN — Medication 3 MILLILITER(S): at 22:43

## 2019-04-06 RX ADMIN — SENNA PLUS 2 TABLET(S): 8.6 TABLET ORAL at 21:43

## 2019-04-06 RX ADMIN — PANTOPRAZOLE SODIUM 40 MILLIGRAM(S): 20 TABLET, DELAYED RELEASE ORAL at 05:24

## 2019-04-06 RX ADMIN — Medication 1 TABLET(S): at 09:35

## 2019-04-06 NOTE — PROGRESS NOTE ADULT - ASSESSMENT
74F hx of ETOH use, CAD/CABGx4, AAA repair, HTN, HLD, T2DM pw s/p mechanical fall with L2 vertebral fx.

## 2019-04-06 NOTE — DIETITIAN INITIAL EVALUATION ADULT. - PHYSICAL APPEARANCE
Pt with evidence of mild muscle depletion in temporal and clavicle region) + subcutaneous fat loss of triceps as per NFPE

## 2019-04-06 NOTE — DIETITIAN INITIAL EVALUATION ADULT. - ORAL INTAKE PTA
poor/Pt states that her appetite has been poor over the last two weeks- states that she just does not feel like eating. Reports eating 1 meal/day usually.

## 2019-04-06 NOTE — DIETITIAN INITIAL EVALUATION ADULT. - NS AS NUTRI INTERV VITAMIN
Continue MVI, thiamine, folic acid. Consider vitamin D supplementation 2/2 pt report hx vitamin D deficiency/D

## 2019-04-06 NOTE — PROGRESS NOTE ADULT - PROBLEM SELECTOR PLAN 1
acute L2 vertebral compression fx - no surgical intervention  ortho following - lso brace, pt, oob, wbat, pain management

## 2019-04-06 NOTE — DIETITIAN INITIAL EVALUATION ADULT. - ADHERENCE
n/a/Pt reports hx of pre diabetes. States that she has a history of hyponatremia and was instructed to include more salt in her diet. Pt reports taking MVI and vitamin D PTA.

## 2019-04-06 NOTE — CHART NOTE - NSCHARTNOTEFT_GEN_A_CORE
Upon Nutritional Assessment by the Registered Dietitian your patient was determined to meet criteria / has evidence of the following diagnosis/diagnoses:          [ ]  Mild Protein Calorie Malnutrition        [X]  Moderate Protein Calorie Malnutrition        [ ] Severe Protein Calorie Malnutrition        [ ] Unspecified Protein Calorie Malnutrition        [ ] Underweight / BMI <19        [ ] Morbid Obesity / BMI > 40      Findings as based on:  [X] Comprehensive nutrition assessment   [X] Nutrition Focused Physical Exam  [X] Other: mild muscle/fat depletion, pt met <75% estimated nutritional needs x 2weeks      Nutrition Plan/Recommendations:  Pt declines po supplements. Continue MVI, thiamine, folic acid. Consider vitamin D supplementation.         PROVIDER Section:     By signing this assessment you are acknowledging and agree with the diagnosis/diagnoses assigned by the Registered Dietitian    Comments:

## 2019-04-06 NOTE — DIETITIAN INITIAL EVALUATION ADULT. - ENERGY NEEDS
Height: 5'4", Weight: 138.8lbs  Skin: intact, Edema: none  IBW range: 108-132lbs  Last BM: 4/5 per pt

## 2019-04-06 NOTE — DIETITIAN INITIAL EVALUATION ADULT. - PERTINENT MEDS FT
SSI, albuterol, lipitor, pulmicort, folic acid, ativan, toprol, morphine, MVI, zofran, protonix, miralax, senna, thiamine, solu-medrol

## 2019-04-06 NOTE — PROGRESS NOTE ADULT - PROBLEM SELECTOR PLAN 6
pt with episode of hypoxia as per nursing staff will obtain cxr to evaluate  currently 95% with nasal canula

## 2019-04-06 NOTE — DIETITIAN INITIAL EVALUATION ADULT. - OTHER INFO
Pt is a 74F hx of ETOH use, CAD/CABGx4, AAA repair, HTN, HLD, T2DM pw s/p mechanical fall with L2 vertebral fx. Pt tolerating consistent carbohydrate diet with report of fair appetite. States that she ate a small amount at breakfast. Pt denies any recent weight changes, but reports poor appetite x 2 weeks. Pt denies chewing/swallowing difficulty. Report intolerance to sour cream. Declines PO supplements at this time (Glucerna). Reviewed menu ordering procedures and obtained preferences in order to optimize po intake.

## 2019-04-07 DIAGNOSIS — F10.231 ALCOHOL DEPENDENCE WITH WITHDRAWAL DELIRIUM: ICD-10-CM

## 2019-04-07 DIAGNOSIS — I50.33 ACUTE ON CHRONIC DIASTOLIC (CONGESTIVE) HEART FAILURE: ICD-10-CM

## 2019-04-07 DIAGNOSIS — D72.829 ELEVATED WHITE BLOOD CELL COUNT, UNSPECIFIED: ICD-10-CM

## 2019-04-07 DIAGNOSIS — J45.901 UNSPECIFIED ASTHMA WITH (ACUTE) EXACERBATION: ICD-10-CM

## 2019-04-07 LAB
ANION GAP SERPL CALC-SCNC: 11 MMOL/L — SIGNIFICANT CHANGE UP (ref 5–17)
BUN SERPL-MCNC: 22 MG/DL — SIGNIFICANT CHANGE UP (ref 7–23)
CALCIUM SERPL-MCNC: 8.7 MG/DL — SIGNIFICANT CHANGE UP (ref 8.4–10.5)
CHLORIDE SERPL-SCNC: 96 MMOL/L — SIGNIFICANT CHANGE UP (ref 96–108)
CO2 SERPL-SCNC: 25 MMOL/L — SIGNIFICANT CHANGE UP (ref 22–31)
CREAT SERPL-MCNC: 1.26 MG/DL — SIGNIFICANT CHANGE UP (ref 0.5–1.3)
GLUCOSE SERPL-MCNC: 258 MG/DL — HIGH (ref 70–99)
HCT VFR BLD CALC: 36.9 % — SIGNIFICANT CHANGE UP (ref 34.5–45)
HGB BLD-MCNC: 12.6 G/DL — SIGNIFICANT CHANGE UP (ref 11.5–15.5)
MCHC RBC-ENTMCNC: 34.1 GM/DL — SIGNIFICANT CHANGE UP (ref 32–36)
MCHC RBC-ENTMCNC: 34.5 PG — HIGH (ref 27–34)
MCV RBC AUTO: 101.2 FL — HIGH (ref 80–100)
PLATELET # BLD AUTO: 204 K/UL — SIGNIFICANT CHANGE UP (ref 150–400)
POTASSIUM SERPL-MCNC: 4.4 MMOL/L — SIGNIFICANT CHANGE UP (ref 3.5–5.3)
POTASSIUM SERPL-SCNC: 4.4 MMOL/L — SIGNIFICANT CHANGE UP (ref 3.5–5.3)
RBC # BLD: 3.64 M/UL — LOW (ref 3.8–5.2)
RBC # FLD: 12.3 % — SIGNIFICANT CHANGE UP (ref 10.3–14.5)
SODIUM SERPL-SCNC: 132 MMOL/L — LOW (ref 135–145)
WBC # BLD: 11.4 K/UL — HIGH (ref 3.8–10.5)
WBC # FLD AUTO: 11.4 K/UL — HIGH (ref 3.8–10.5)

## 2019-04-07 PROCEDURE — 99233 SBSQ HOSP IP/OBS HIGH 50: CPT

## 2019-04-07 RX ORDER — FUROSEMIDE 40 MG
40 TABLET ORAL ONCE
Qty: 0 | Refills: 0 | Status: COMPLETED | OUTPATIENT
Start: 2019-04-07 | End: 2019-04-07

## 2019-04-07 RX ORDER — IPRATROPIUM/ALBUTEROL SULFATE 18-103MCG
3 AEROSOL WITH ADAPTER (GRAM) INHALATION EVERY 6 HOURS
Qty: 0 | Refills: 0 | Status: DISCONTINUED | OUTPATIENT
Start: 2019-04-07 | End: 2019-04-11

## 2019-04-07 RX ORDER — INSULIN LISPRO 100/ML
VIAL (ML) SUBCUTANEOUS AT BEDTIME
Qty: 0 | Refills: 0 | Status: DISCONTINUED | OUTPATIENT
Start: 2019-04-07 | End: 2019-04-12

## 2019-04-07 RX ORDER — INSULIN LISPRO 100/ML
VIAL (ML) SUBCUTANEOUS
Qty: 0 | Refills: 0 | Status: DISCONTINUED | OUTPATIENT
Start: 2019-04-07 | End: 2019-04-12

## 2019-04-07 RX ADMIN — ATORVASTATIN CALCIUM 20 MILLIGRAM(S): 80 TABLET, FILM COATED ORAL at 21:14

## 2019-04-07 RX ADMIN — OXYCODONE HYDROCHLORIDE 5 MILLIGRAM(S): 5 TABLET ORAL at 13:18

## 2019-04-07 RX ADMIN — MAGNESIUM OXIDE 400 MG ORAL TABLET 400 MILLIGRAM(S): 241.3 TABLET ORAL at 11:01

## 2019-04-07 RX ADMIN — Medication 3 MILLILITER(S): at 03:48

## 2019-04-07 RX ADMIN — PANTOPRAZOLE SODIUM 40 MILLIGRAM(S): 20 TABLET, DELAYED RELEASE ORAL at 06:05

## 2019-04-07 RX ADMIN — Medication 1 MILLIGRAM(S): at 00:25

## 2019-04-07 RX ADMIN — Medication 40 MILLIGRAM(S): at 01:09

## 2019-04-07 RX ADMIN — Medication 100 MILLIGRAM(S): at 11:02

## 2019-04-07 RX ADMIN — Medication 3 MILLILITER(S): at 21:55

## 2019-04-07 RX ADMIN — Medication 3 MILLILITER(S): at 09:13

## 2019-04-07 RX ADMIN — MORPHINE SULFATE 2 MILLIGRAM(S): 50 CAPSULE, EXTENDED RELEASE ORAL at 14:52

## 2019-04-07 RX ADMIN — SENNA PLUS 2 TABLET(S): 8.6 TABLET ORAL at 21:22

## 2019-04-07 RX ADMIN — Medication 1 MILLIGRAM(S): at 11:02

## 2019-04-07 RX ADMIN — Medication 8: at 10:57

## 2019-04-07 RX ADMIN — Medication 40 MILLIGRAM(S): at 06:04

## 2019-04-07 RX ADMIN — Medication 3 MILLILITER(S): at 15:37

## 2019-04-07 RX ADMIN — LOSARTAN POTASSIUM 50 MILLIGRAM(S): 100 TABLET, FILM COATED ORAL at 06:01

## 2019-04-07 RX ADMIN — Medication 40 MILLIGRAM(S): at 10:58

## 2019-04-07 RX ADMIN — Medication 100 MILLIGRAM(S): at 06:02

## 2019-04-07 RX ADMIN — Medication 50 MILLIGRAM(S): at 13:12

## 2019-04-07 RX ADMIN — Medication 0.25 MILLIGRAM(S): at 21:55

## 2019-04-07 RX ADMIN — Medication 2: at 07:39

## 2019-04-07 RX ADMIN — OXYCODONE HYDROCHLORIDE 5 MILLIGRAM(S): 5 TABLET ORAL at 14:18

## 2019-04-07 RX ADMIN — MORPHINE SULFATE 2 MILLIGRAM(S): 50 CAPSULE, EXTENDED RELEASE ORAL at 15:22

## 2019-04-07 RX ADMIN — Medication 0.25 MILLIGRAM(S): at 09:13

## 2019-04-07 RX ADMIN — Medication 1 TABLET(S): at 11:02

## 2019-04-07 RX ADMIN — Medication 50 MILLIGRAM(S): at 06:04

## 2019-04-07 RX ADMIN — Medication 40 MILLIGRAM(S): at 01:39

## 2019-04-07 NOTE — PROGRESS NOTE ADULT - PROBLEM SELECTOR PLAN 1
acute L2 vertebral compression fx - no surgical intervention  ortho following- PT, oob, wbat, pain management  Back brace ordered

## 2019-04-07 NOTE — PROGRESS NOTE ADULT - ASSESSMENT
73 yo female, PMHx EtOH abuse, HTN, asthma, DMT2, CAD s/p CABG x 4, AAA repair, frequent falls, who presented from home s/p mechanical fall sustained L2 vertebral compression fracture c/b acute asthma exacerbation and delirium likely alcohol withdrawal.     1. Delirium  - Likely secondary to EtOH withdrawal.   - On CIWA protocol, though has not yet gotten any benzodiazepines.  - Give stat dose of Ativan  - Will add standing Librium taper (was on librium on last admission as well).  - If refuses PO librium, will convert to standing IV Ativan or Phenobarbital    2. Hypoxia  - Asthma exacerbation vs component of decompensated diastolic heart failure   - Transiently improved with duoneb, remains hypoxic  - Will escalate to continuous pulse ox monitoring  - On last admission required Lasix 60mg IV BID for issues with shortness of breath  - Currently refusing additional treatments for asthma. Will trial with diuretic therapy and reassess.    3. Vertebral Fracture  - Related to mechanical fall at home without LOC  - Ortho input appreciated, plan for conservative management with LSO brace  - Would attempt to limit narcotic administration in favor of NSAIDS and APAP in the setting of delirium.

## 2019-04-07 NOTE — PROGRESS NOTE ADULT - ATTENDING COMMENTS
I have personally seen and examined patient on the above date.  I discussed the case with ZORAIDA Castellanos and I agree with findings and plan as detailed per note above, which I have amended where appropriate.      # Vertebral fx  Analgesics, bowel regimen  Ortho consulted  DVT/GI proph  Appreciate PT eval- JOSE Krueger contact from the orthotist for lumbar brace--rx and face sheet faxed to [phone]    #Wheezing, hx of asthma  add solumedrol 40mg Q12H  acute hypoxic respiratory failure secondary to above. O2 sat 76% on RA, 94% on nasal cannula 2L   check Chest xray     # ETOH use.  CIWA and sx trigger rx.  MVI, FA, thiamine    #Hyponatremia-monitor   d/c fluids     #HypoMg: repleted     # CAD  cont asa,, losartan and metop  meds verified w GC     # T2DM  ISS    Dispo: On CIWA and awaiting PT eval.
I have personally seen and examined patient on the above date.  I discussed the case with HEIDY Chaudhary and I agree with findings and plan as detailed per note above, which I have amended where appropriate.      # Vertebral fx  Analgesics, bowel regimen  Ortho consulted, awaiting brace   DVT/GI proph  Appreciate PT eran Krueger contact from the orthotist for lumbar brace--rx and face sheet faxed to [phone]    #Wheezing, hx of asthma   solumedrol 40mg Q12H- changed to PO prednisone  acute hypoxic respiratory failure secondary to above. O2 sat 76% on RA, 94% on nasal cannula 2L, taper oxygen   chest xray - no pneumonia/effusion    # ETOH use.  CIWA and sx trigger rx.  MVI, FA, thiamine    #Hyponatremia-monitor   d/c fluids     #HypoMg: repleted     # CAD  cont asa,, losartan and metop  meds verified w GC     # T2DM  ISS    Dispo: On CIWA   JOSE

## 2019-04-07 NOTE — PROGRESS NOTE ADULT - PROBLEM SELECTOR PLAN 6
from COPD/ anxiety  satting 98% on 2 L  stop IV solumedrol and start prednisone 40 mg daily--anticipate quick taper  cont nebs  cont to monitor O2 sat

## 2019-04-08 LAB
ANION GAP SERPL CALC-SCNC: 9 MMOL/L — SIGNIFICANT CHANGE UP (ref 5–17)
BUN SERPL-MCNC: 35 MG/DL — HIGH (ref 7–23)
CALCIUM SERPL-MCNC: 8.6 MG/DL — SIGNIFICANT CHANGE UP (ref 8.4–10.5)
CHLORIDE SERPL-SCNC: 96 MMOL/L — SIGNIFICANT CHANGE UP (ref 96–108)
CO2 SERPL-SCNC: 28 MMOL/L — SIGNIFICANT CHANGE UP (ref 22–31)
CREAT SERPL-MCNC: 1.09 MG/DL — SIGNIFICANT CHANGE UP (ref 0.5–1.3)
GLUCOSE BLDC GLUCOMTR-MCNC: 152 MG/DL — HIGH (ref 70–99)
GLUCOSE BLDC GLUCOMTR-MCNC: 192 MG/DL — HIGH (ref 70–99)
GLUCOSE BLDC GLUCOMTR-MCNC: 213 MG/DL — HIGH (ref 70–99)
GLUCOSE SERPL-MCNC: 150 MG/DL — HIGH (ref 70–99)
HCT VFR BLD CALC: 34.2 % — LOW (ref 34.5–45)
HGB BLD-MCNC: 11.2 G/DL — LOW (ref 11.5–15.5)
MCHC RBC-ENTMCNC: 32.8 GM/DL — SIGNIFICANT CHANGE UP (ref 32–36)
MCHC RBC-ENTMCNC: 33 PG — SIGNIFICANT CHANGE UP (ref 27–34)
MCV RBC AUTO: 100.4 FL — HIGH (ref 80–100)
NT-PROBNP SERPL-SCNC: HIGH PG/ML (ref 0–300)
PLATELET # BLD AUTO: 201 K/UL — SIGNIFICANT CHANGE UP (ref 150–400)
POTASSIUM SERPL-MCNC: 4.3 MMOL/L — SIGNIFICANT CHANGE UP (ref 3.5–5.3)
POTASSIUM SERPL-SCNC: 4.3 MMOL/L — SIGNIFICANT CHANGE UP (ref 3.5–5.3)
RBC # BLD: 3.4 M/UL — LOW (ref 3.8–5.2)
RBC # FLD: 12.4 % — SIGNIFICANT CHANGE UP (ref 10.3–14.5)
SODIUM SERPL-SCNC: 133 MMOL/L — LOW (ref 135–145)
WBC # BLD: 15.8 K/UL — HIGH (ref 3.8–10.5)
WBC # FLD AUTO: 15.8 K/UL — HIGH (ref 3.8–10.5)

## 2019-04-08 PROCEDURE — 71045 X-RAY EXAM CHEST 1 VIEW: CPT | Mod: 26

## 2019-04-08 PROCEDURE — 99497 ADVNCD CARE PLAN 30 MIN: CPT | Mod: 25

## 2019-04-08 PROCEDURE — 99233 SBSQ HOSP IP/OBS HIGH 50: CPT

## 2019-04-08 RX ORDER — FUROSEMIDE 40 MG
40 TABLET ORAL DAILY
Qty: 0 | Refills: 0 | Status: DISCONTINUED | OUTPATIENT
Start: 2019-04-08 | End: 2019-04-09

## 2019-04-08 RX ORDER — DOCUSATE SODIUM 100 MG
100 CAPSULE ORAL
Qty: 0 | Refills: 0 | Status: DISCONTINUED | OUTPATIENT
Start: 2019-04-08 | End: 2019-04-09

## 2019-04-08 RX ADMIN — LOSARTAN POTASSIUM 50 MILLIGRAM(S): 100 TABLET, FILM COATED ORAL at 06:00

## 2019-04-08 RX ADMIN — Medication 40 MILLIGRAM(S): at 06:01

## 2019-04-08 RX ADMIN — MORPHINE SULFATE 2 MILLIGRAM(S): 50 CAPSULE, EXTENDED RELEASE ORAL at 15:30

## 2019-04-08 RX ADMIN — Medication 3 MILLILITER(S): at 15:47

## 2019-04-08 RX ADMIN — OXYCODONE HYDROCHLORIDE 5 MILLIGRAM(S): 5 TABLET ORAL at 19:54

## 2019-04-08 RX ADMIN — Medication 2: at 11:42

## 2019-04-08 RX ADMIN — OXYCODONE HYDROCHLORIDE 5 MILLIGRAM(S): 5 TABLET ORAL at 21:30

## 2019-04-08 RX ADMIN — OXYCODONE HYDROCHLORIDE 5 MILLIGRAM(S): 5 TABLET ORAL at 12:30

## 2019-04-08 RX ADMIN — ATORVASTATIN CALCIUM 20 MILLIGRAM(S): 80 TABLET, FILM COATED ORAL at 23:33

## 2019-04-08 RX ADMIN — Medication 100 MILLIGRAM(S): at 06:01

## 2019-04-08 RX ADMIN — Medication 0.25 MILLIGRAM(S): at 09:52

## 2019-04-08 RX ADMIN — MORPHINE SULFATE 2 MILLIGRAM(S): 50 CAPSULE, EXTENDED RELEASE ORAL at 00:30

## 2019-04-08 RX ADMIN — Medication 3 MILLILITER(S): at 09:52

## 2019-04-08 RX ADMIN — Medication 5 MILLIGRAM(S): at 23:33

## 2019-04-08 RX ADMIN — Medication 1 TABLET(S): at 11:43

## 2019-04-08 RX ADMIN — Medication 50 MILLIGRAM(S): at 17:46

## 2019-04-08 RX ADMIN — SENNA PLUS 2 TABLET(S): 8.6 TABLET ORAL at 21:39

## 2019-04-08 RX ADMIN — OXYCODONE HYDROCHLORIDE 5 MILLIGRAM(S): 5 TABLET ORAL at 11:51

## 2019-04-08 RX ADMIN — MORPHINE SULFATE 2 MILLIGRAM(S): 50 CAPSULE, EXTENDED RELEASE ORAL at 15:14

## 2019-04-08 RX ADMIN — Medication 3 MILLILITER(S): at 21:31

## 2019-04-08 RX ADMIN — MORPHINE SULFATE 2 MILLIGRAM(S): 50 CAPSULE, EXTENDED RELEASE ORAL at 01:00

## 2019-04-08 RX ADMIN — PANTOPRAZOLE SODIUM 40 MILLIGRAM(S): 20 TABLET, DELAYED RELEASE ORAL at 06:01

## 2019-04-08 RX ADMIN — Medication 2: at 17:00

## 2019-04-08 RX ADMIN — Medication 40 MILLIGRAM(S): at 17:46

## 2019-04-08 RX ADMIN — ENOXAPARIN SODIUM 40 MILLIGRAM(S): 100 INJECTION SUBCUTANEOUS at 11:43

## 2019-04-08 RX ADMIN — Medication 1 MILLIGRAM(S): at 11:43

## 2019-04-08 RX ADMIN — Medication 100 MILLIGRAM(S): at 21:39

## 2019-04-08 RX ADMIN — MAGNESIUM OXIDE 400 MG ORAL TABLET 400 MILLIGRAM(S): 241.3 TABLET ORAL at 11:43

## 2019-04-08 RX ADMIN — Medication 50 MILLIGRAM(S): at 06:00

## 2019-04-08 NOTE — PROGRESS NOTE ADULT - ASSESSMENT
74F hx of ETOH use, CAD/CABGx4, AAA repair, HTN, HLD, T2DM pw s/p mechanical fall with acute L2 vertebral fx. Course complicated by CHF exacerbation and alcohol withdrawal (See below for details)    #Acute L2 compression fracture s/p mechanical fall  -PT/OT  -PMR eval pending PT input  -LSO brace recommended - but patient refusing to wear this brace  -Pain control, prn    #Acute pulmonary edema, suspect new CHF exacerbation  -Will order ECHO to assess EF  -IV lasix daily for now  -Repeat CXR and pro-BNP in 2 days  -No more IVF at this time  -Nebulizers, suspect "cardiac wheeze"    #Acute hypoxic respiratory failure secondary to #2 above  -O2 supplement  -Expect improved with IV lasix    #Hyperglycemia, steroid induced  #Leukocytosis, steroid induced  #History of diabetes, controlled, A1C 5.7, on metformin at home  -ISS  -D/C steroids, this is not a severe asthma or COPD exacerbation    #Alcohol withdrawal with DTs  -Improved, on Librium, with standing taper  -CIWA score is zero    #Essential HTN: stable on current regimen    DVT ppx: Lovenox 74F hx of ETOH use, CAD/CABGx4, AAA repair, HTN, HLD, T2DM pw s/p mechanical fall with acute L2 vertebral fx. Course complicated by CHF exacerbation and alcohol withdrawal (See below for details)    #Acute L2 compression fracture s/p mechanical fall  -PT/OT  -PMR eval pending PT input  -LSO brace recommended - but patient refusing to wear this brace  -Pain control, prn    #Acute pulmonary edema, suspect new CHF exacerbation  -Will order ECHO to assess EF  -IV lasix daily for now  -Repeat CXR and pro-BNP in 2 days  -No more IVF at this time  -Nebulizers, suspect "cardiac wheeze"    #Acute hypoxic respiratory failure secondary to #2 above  -O2 supplement  -Expect improved with IV lasix    #Hyperglycemia, steroid induced  #Leukocytosis, steroid induced  #History of diabetes, controlled, A1C 5.7, on metformin at home  -ISS  -D/C steroids, this is not a severe asthma or COPD exacerbation    #Alcohol withdrawal with DTs  -Improved, on Librium, with standing taper  -CIWA score is zero    #Essential HTN: stable on current regimen    DVT ppx: Lovenox    #Advanced Care planning: Patient wishes to be DNR/DNI. See [facility] note    Attempted to reach Ramírez Gracias, , to update plan of care. No answer. Will try again tomorrow. 74F hx of ETOH use, CAD/CABGx4, AAA repair, HTN, HLD, T2DM pw s/p mechanical fall with acute L2 vertebral fx. Course complicated by CHF exacerbation and alcohol withdrawal (See below for details)    #Acute L2 compression fracture s/p mechanical fall  -PT/OT  -PMR eval pending PT input  -LSO brace recommended - but patient refusing to wear this brace  -Pain control, prn    #Acute pulmonary edema, suspect new CHF exacerbation  -Will order ECHO to assess EF  -IV lasix daily for now  -Repeat CXR and pro-BNP in 2 days  -No more IVF at this time  -Nebulizers, suspect "cardiac wheeze"    #Acute hypoxic respiratory failure secondary to #2 above  -O2 supplement  -Expect improved with IV lasix    #Hyperglycemia, steroid induced  #Leukocytosis, steroid induced  #History of diabetes, controlled, A1C 5.7, on metformin at home  -ISS  -D/C steroids, this is not a severe asthma or COPD exacerbation    #Alcohol withdrawal with DTs  -Improved, on Librium, with standing taper  -CIWA score is zero    #Essential HTN: stable on current regimen    #constipation: Add dulcolax, may need suppository if no BM by tomorrow (said it's been 3 days)    DVT ppx: Lovenox    #Advanced Care planning: Patient wishes to be DNR/DNI. See [facility] note    Attempted to reach Ramírez Beckgins, , to update plan of care. No answer. Will try again tomorrow.

## 2019-04-08 NOTE — GOALS OF CARE CONVERSATION - PERSONAL ADVANCE DIRECTIVE - CONVERSATION DETAILS
We discussed MOLST form and advanced care planning. Patient expressed her desire to be DNR/DNI. She expressed her understanding of what "DNR/DNI" means. Other than that, she would like to be treated in a hospital, as needed. MOLST form filled out and placed in patient's bedside chart. Attempted to let her , Ramírez, know about these wishes, however, he did not  his phone. Face-to-face Time spent on advanced care planning independent of the rest of the exam - 17 minutes. We discussed MOLST form and advanced care planning. Patient expressed her desire to be DNR/DNI. She expressed her understanding of what "DNR/DNI" means. Other than that, she would like to be treated in a hospital, as needed. Okay for  "short trial" of feeding tube if needed. MOLST form filled out and placed in patient's bedside chart. Attempted to let her , Ramírez, know about these wishes, however, he did not  his phone. Face-to-face Time spent on advanced care planning independent of the rest of the exam - 17 minutes.

## 2019-04-09 LAB
ANION GAP SERPL CALC-SCNC: 8 MMOL/L — SIGNIFICANT CHANGE UP (ref 5–17)
BASOPHILS # BLD AUTO: 0 K/UL — SIGNIFICANT CHANGE UP (ref 0–0.2)
BASOPHILS NFR BLD AUTO: 0.2 % — SIGNIFICANT CHANGE UP (ref 0–2)
BUN SERPL-MCNC: 44 MG/DL — HIGH (ref 7–23)
CALCIUM SERPL-MCNC: 8.9 MG/DL — SIGNIFICANT CHANGE UP (ref 8.4–10.5)
CHLORIDE SERPL-SCNC: 97 MMOL/L — SIGNIFICANT CHANGE UP (ref 96–108)
CO2 SERPL-SCNC: 31 MMOL/L — SIGNIFICANT CHANGE UP (ref 22–31)
CREAT SERPL-MCNC: 1.18 MG/DL — SIGNIFICANT CHANGE UP (ref 0.5–1.3)
EOSINOPHIL # BLD AUTO: 0 K/UL — SIGNIFICANT CHANGE UP (ref 0–0.5)
EOSINOPHIL NFR BLD AUTO: 0.2 % — SIGNIFICANT CHANGE UP (ref 0–6)
GLUCOSE BLDC GLUCOMTR-MCNC: 107 MG/DL — HIGH (ref 70–99)
GLUCOSE BLDC GLUCOMTR-MCNC: 118 MG/DL — HIGH (ref 70–99)
GLUCOSE BLDC GLUCOMTR-MCNC: 120 MG/DL — HIGH (ref 70–99)
GLUCOSE BLDC GLUCOMTR-MCNC: 135 MG/DL — HIGH (ref 70–99)
GLUCOSE SERPL-MCNC: 116 MG/DL — HIGH (ref 70–99)
HCT VFR BLD CALC: 32.9 % — LOW (ref 34.5–45)
HGB BLD-MCNC: 11.2 G/DL — LOW (ref 11.5–15.5)
LYMPHOCYTES # BLD AUTO: 1.9 K/UL — SIGNIFICANT CHANGE UP (ref 1–3.3)
LYMPHOCYTES # BLD AUTO: 12 % — LOW (ref 13–44)
MCHC RBC-ENTMCNC: 34.1 GM/DL — SIGNIFICANT CHANGE UP (ref 32–36)
MCHC RBC-ENTMCNC: 34.7 PG — HIGH (ref 27–34)
MCV RBC AUTO: 101.9 FL — HIGH (ref 80–100)
MONOCYTES # BLD AUTO: 0.9 K/UL — SIGNIFICANT CHANGE UP (ref 0–0.9)
MONOCYTES NFR BLD AUTO: 5.6 % — SIGNIFICANT CHANGE UP (ref 2–14)
NEUTROPHILS # BLD AUTO: 12.8 K/UL — HIGH (ref 1.8–7.4)
NEUTROPHILS NFR BLD AUTO: 82 % — HIGH (ref 43–77)
PLATELET # BLD AUTO: 201 K/UL — SIGNIFICANT CHANGE UP (ref 150–400)
POTASSIUM SERPL-MCNC: 3.9 MMOL/L — SIGNIFICANT CHANGE UP (ref 3.5–5.3)
POTASSIUM SERPL-SCNC: 3.9 MMOL/L — SIGNIFICANT CHANGE UP (ref 3.5–5.3)
RBC # BLD: 3.23 M/UL — LOW (ref 3.8–5.2)
RBC # FLD: 12.6 % — SIGNIFICANT CHANGE UP (ref 10.3–14.5)
SODIUM SERPL-SCNC: 136 MMOL/L — SIGNIFICANT CHANGE UP (ref 135–145)
WBC # BLD: 15.6 K/UL — HIGH (ref 3.8–10.5)
WBC # FLD AUTO: 15.6 K/UL — HIGH (ref 3.8–10.5)

## 2019-04-09 PROCEDURE — 93306 TTE W/DOPPLER COMPLETE: CPT | Mod: 26

## 2019-04-09 PROCEDURE — 99233 SBSQ HOSP IP/OBS HIGH 50: CPT

## 2019-04-09 RX ORDER — DOCUSATE SODIUM 100 MG
100 CAPSULE ORAL THREE TIMES A DAY
Qty: 0 | Refills: 0 | Status: DISCONTINUED | OUTPATIENT
Start: 2019-04-09 | End: 2019-04-12

## 2019-04-09 RX ORDER — FUROSEMIDE 40 MG
20 TABLET ORAL DAILY
Qty: 0 | Refills: 0 | Status: DISCONTINUED | OUTPATIENT
Start: 2019-04-10 | End: 2019-04-12

## 2019-04-09 RX ORDER — POLYETHYLENE GLYCOL 3350 17 G/17G
17 POWDER, FOR SOLUTION ORAL DAILY
Qty: 0 | Refills: 0 | Status: DISCONTINUED | OUTPATIENT
Start: 2019-04-09 | End: 2019-04-12

## 2019-04-09 RX ADMIN — OXYCODONE HYDROCHLORIDE 5 MILLIGRAM(S): 5 TABLET ORAL at 06:08

## 2019-04-09 RX ADMIN — Medication 1 MILLIGRAM(S): at 11:51

## 2019-04-09 RX ADMIN — Medication 3 MILLILITER(S): at 10:26

## 2019-04-09 RX ADMIN — Medication 5 MILLIGRAM(S): at 22:36

## 2019-04-09 RX ADMIN — PANTOPRAZOLE SODIUM 40 MILLIGRAM(S): 20 TABLET, DELAYED RELEASE ORAL at 06:03

## 2019-04-09 RX ADMIN — Medication 100 MILLIGRAM(S): at 22:35

## 2019-04-09 RX ADMIN — SENNA PLUS 2 TABLET(S): 8.6 TABLET ORAL at 22:35

## 2019-04-09 RX ADMIN — Medication 25 MILLIGRAM(S): at 06:03

## 2019-04-09 RX ADMIN — OXYCODONE HYDROCHLORIDE 5 MILLIGRAM(S): 5 TABLET ORAL at 22:36

## 2019-04-09 RX ADMIN — Medication 3 MILLILITER(S): at 21:52

## 2019-04-09 RX ADMIN — ATORVASTATIN CALCIUM 20 MILLIGRAM(S): 80 TABLET, FILM COATED ORAL at 22:35

## 2019-04-09 RX ADMIN — OXYCODONE HYDROCHLORIDE 5 MILLIGRAM(S): 5 TABLET ORAL at 11:50

## 2019-04-09 RX ADMIN — MORPHINE SULFATE 2 MILLIGRAM(S): 50 CAPSULE, EXTENDED RELEASE ORAL at 16:31

## 2019-04-09 RX ADMIN — Medication 25 MILLIGRAM(S): at 22:35

## 2019-04-09 RX ADMIN — OXYCODONE HYDROCHLORIDE 5 MILLIGRAM(S): 5 TABLET ORAL at 07:08

## 2019-04-09 RX ADMIN — OXYCODONE HYDROCHLORIDE 5 MILLIGRAM(S): 5 TABLET ORAL at 12:50

## 2019-04-09 RX ADMIN — Medication 3 MILLILITER(S): at 15:59

## 2019-04-09 RX ADMIN — LOSARTAN POTASSIUM 50 MILLIGRAM(S): 100 TABLET, FILM COATED ORAL at 06:03

## 2019-04-09 RX ADMIN — Medication 40 MILLIGRAM(S): at 09:36

## 2019-04-09 RX ADMIN — Medication 100 MILLIGRAM(S): at 06:03

## 2019-04-09 RX ADMIN — MAGNESIUM OXIDE 400 MG ORAL TABLET 400 MILLIGRAM(S): 241.3 TABLET ORAL at 11:51

## 2019-04-09 RX ADMIN — MORPHINE SULFATE 2 MILLIGRAM(S): 50 CAPSULE, EXTENDED RELEASE ORAL at 16:46

## 2019-04-09 RX ADMIN — Medication 1 TABLET(S): at 11:51

## 2019-04-09 RX ADMIN — OXYCODONE HYDROCHLORIDE 5 MILLIGRAM(S): 5 TABLET ORAL at 23:35

## 2019-04-09 NOTE — PROGRESS NOTE ADULT - ASSESSMENT
74F hx of ETOH use, CAD/CABGx4, AAA repair, HTN, HLD, T2DM pw s/p mechanical fall with acute L2 vertebral fx. Course complicated by CHF exacerbation and alcohol withdrawal (See below for details)    #Acute L2 compression fracture s/p mechanical fall  -PT/OT  -PMR eval pending PT input  -LSO brace recommended - but patient refusing to wear this brace  -Pain control, prn    #Acute pulmonary edema, suspect new CHF exacerbation  -Will order ECHO to assess EF  -Overall improved, and with increased Cr, will change to PO lasix starting tomorrow  -Repeat CXR in 1-2 days, repeat proBNP in AM  -No more IVF at this time  -Nebulizers, suspect "cardiac wheeze"    #Acute hypoxic respiratory failure secondary to #2 above  -O2 supplement    #Hyperglycemia, steroid induced  #Leukocytosis, steroid induced  #History of diabetes, controlled, A1C 5.7, on metformin at home  -ISS  -now off steroids, this is not a severe asthma or COPD exacerbation    #Alcohol withdrawal with DTs  -Improved, on Librium, with standing taper  -CIWA score is zero    #Essential HTN: d/c ARB in setting of GABY (see below), stable    #GABY: Cr ~0.6 --- now Cr ~1.2 -- hold ARB x 1-2 days, could partially be due to lasix, which has helped with her breathing status. Decrease lasix dose, expect renal function to normalize. PVR x 1    #constipation: could like to urinary retention, check PVR x 1. dulcolax added, patient refusing suppositories. one small BM yesterday    DVT ppx: Lovenox    #Advanced Care planning: DNR/DNI    Attempted to call Ramírez: [phone] - no answer today with no voicemail option, will reattempt tomorrow. Work phone number located from a previous care coordination assessment found - call placed - received a voicemail for a company - I did not leave a message in this general mailbox.

## 2019-04-09 NOTE — PHYSICAL THERAPY INITIAL EVALUATION ADULT - ADDITIONAL COMMENTS
Pt lives in house with .  No INOCENCIO, 14 steps with 1 rail to bedroom.  Pt was independent without a device at home, independent with ADLs.  Owns RW and SAC.

## 2019-04-10 LAB
ANION GAP SERPL CALC-SCNC: 10 MMOL/L — SIGNIFICANT CHANGE UP (ref 5–17)
BUN SERPL-MCNC: 31 MG/DL — HIGH (ref 7–23)
CALCIUM SERPL-MCNC: 10.9 MG/DL — HIGH (ref 8.4–10.5)
CHLORIDE SERPL-SCNC: 98 MMOL/L — SIGNIFICANT CHANGE UP (ref 96–108)
CO2 SERPL-SCNC: 30 MMOL/L — SIGNIFICANT CHANGE UP (ref 22–31)
CREAT SERPL-MCNC: 1 MG/DL — SIGNIFICANT CHANGE UP (ref 0.5–1.3)
GLUCOSE BLDC GLUCOMTR-MCNC: 105 MG/DL — HIGH (ref 70–99)
GLUCOSE BLDC GLUCOMTR-MCNC: 123 MG/DL — HIGH (ref 70–99)
GLUCOSE BLDC GLUCOMTR-MCNC: 176 MG/DL — HIGH (ref 70–99)
GLUCOSE SERPL-MCNC: 117 MG/DL — HIGH (ref 70–99)
HCT VFR BLD CALC: 40.2 % — SIGNIFICANT CHANGE UP (ref 34.5–45)
HGB BLD-MCNC: 13.2 G/DL — SIGNIFICANT CHANGE UP (ref 11.5–15.5)
MCHC RBC-ENTMCNC: 32.8 GM/DL — SIGNIFICANT CHANGE UP (ref 32–36)
MCHC RBC-ENTMCNC: 33.7 PG — SIGNIFICANT CHANGE UP (ref 27–34)
MCV RBC AUTO: 102.8 FL — HIGH (ref 80–100)
NT-PROBNP SERPL-SCNC: 3642 PG/ML — HIGH (ref 0–300)
PLATELET # BLD AUTO: 234 K/UL — SIGNIFICANT CHANGE UP (ref 150–400)
POTASSIUM SERPL-MCNC: 4 MMOL/L — SIGNIFICANT CHANGE UP (ref 3.5–5.3)
POTASSIUM SERPL-SCNC: 4 MMOL/L — SIGNIFICANT CHANGE UP (ref 3.5–5.3)
RBC # BLD: 3.92 M/UL — SIGNIFICANT CHANGE UP (ref 3.8–5.2)
RBC # FLD: 12.7 % — SIGNIFICANT CHANGE UP (ref 10.3–14.5)
SODIUM SERPL-SCNC: 138 MMOL/L — SIGNIFICANT CHANGE UP (ref 135–145)
WBC # BLD: 8.3 K/UL — SIGNIFICANT CHANGE UP (ref 3.8–10.5)
WBC # FLD AUTO: 8.3 K/UL — SIGNIFICANT CHANGE UP (ref 3.8–10.5)

## 2019-04-10 PROCEDURE — 99233 SBSQ HOSP IP/OBS HIGH 50: CPT

## 2019-04-10 PROCEDURE — 71045 X-RAY EXAM CHEST 1 VIEW: CPT | Mod: 26

## 2019-04-10 PROCEDURE — 99223 1ST HOSP IP/OBS HIGH 75: CPT

## 2019-04-10 RX ADMIN — Medication 100 MILLIGRAM(S): at 21:51

## 2019-04-10 RX ADMIN — PANTOPRAZOLE SODIUM 40 MILLIGRAM(S): 20 TABLET, DELAYED RELEASE ORAL at 06:06

## 2019-04-10 RX ADMIN — Medication 1 TABLET(S): at 11:29

## 2019-04-10 RX ADMIN — POLYETHYLENE GLYCOL 3350 17 GRAM(S): 17 POWDER, FOR SOLUTION ORAL at 11:29

## 2019-04-10 RX ADMIN — OXYCODONE HYDROCHLORIDE 5 MILLIGRAM(S): 5 TABLET ORAL at 22:45

## 2019-04-10 RX ADMIN — MORPHINE SULFATE 2 MILLIGRAM(S): 50 CAPSULE, EXTENDED RELEASE ORAL at 10:23

## 2019-04-10 RX ADMIN — Medication 25 MILLIGRAM(S): at 11:27

## 2019-04-10 RX ADMIN — OXYCODONE HYDROCHLORIDE 5 MILLIGRAM(S): 5 TABLET ORAL at 14:30

## 2019-04-10 RX ADMIN — MAGNESIUM OXIDE 400 MG ORAL TABLET 400 MILLIGRAM(S): 241.3 TABLET ORAL at 11:29

## 2019-04-10 RX ADMIN — Medication 100 MILLIGRAM(S): at 06:06

## 2019-04-10 RX ADMIN — OXYCODONE HYDROCHLORIDE 5 MILLIGRAM(S): 5 TABLET ORAL at 13:47

## 2019-04-10 RX ADMIN — OXYCODONE HYDROCHLORIDE 5 MILLIGRAM(S): 5 TABLET ORAL at 21:51

## 2019-04-10 RX ADMIN — Medication 3 MILLILITER(S): at 15:44

## 2019-04-10 RX ADMIN — SENNA PLUS 2 TABLET(S): 8.6 TABLET ORAL at 21:51

## 2019-04-10 RX ADMIN — Medication 5 MILLIGRAM(S): at 21:51

## 2019-04-10 RX ADMIN — Medication 3 MILLILITER(S): at 04:45

## 2019-04-10 RX ADMIN — Medication 2: at 17:28

## 2019-04-10 RX ADMIN — MORPHINE SULFATE 2 MILLIGRAM(S): 50 CAPSULE, EXTENDED RELEASE ORAL at 10:40

## 2019-04-10 RX ADMIN — Medication 3 MILLILITER(S): at 10:10

## 2019-04-10 RX ADMIN — Medication 1 MILLIGRAM(S): at 11:28

## 2019-04-10 RX ADMIN — Medication 20 MILLIGRAM(S): at 06:06

## 2019-04-10 RX ADMIN — Medication 1000 UNIT(S): at 11:28

## 2019-04-10 RX ADMIN — ATORVASTATIN CALCIUM 20 MILLIGRAM(S): 80 TABLET, FILM COATED ORAL at 21:51
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
## 2019-04-10 NOTE — PROGRESS NOTE ADULT - ASSESSMENT
74F hx of ETOH use, CAD/CABGx4, AAA repair, HTN, HLD, T2DM pw s/p mechanical fall with acute L2 vertebral fx. Course complicated by CHF exacerbation and alcohol withdrawal (See below for details)    #Acute L2 compression fracture s/p mechanical fall  -PT - recommend rehab  -OT consulted  -PMR eval ordered  -LSO brace recommended - but patient refusing to wear this brace  -Pain control, prn    #Acute pulmonary edema, suspect diastolic CHF exacerbation  -EF is 51%  -Overall improved, now on PO lasix   -No more IVF at this time  -Nebulizers for suspected "cardiac wheeze" -which is now resolved    #Acute hypoxic respiratory failure secondary to #2 above  -O2 supplement, O2 sat 88-92% on RA at rest during my exam    #Hyperglycemia, steroid induced  #Leukocytosis, steroid induced  #History of diabetes, controlled, A1C 5.7, on metformin at home  -ISS  -now off steroids, this is not a severe asthma or COPD exacerbation    #Alcohol withdrawal with DTs  -Improved, on Librium, with standing taper - last dose today   -CIWA score is zero    #Essential HTN: d/c ARB in setting of GABY (see below), stable    #GABY: Cr ~0.6 --- now Cr ~1.2 -- hold ARB x 1-2 days, could partially be due to lasix, which has helped with her breathing status. Decrease lasix dose, expect renal function to normalize. Repeat labs today    #constipation: could like to urinary retention, check PVR x 1. dulcolax added, patient refusing suppositories. one small BM yesterday    DVT ppx: Lovenox    #Advanced Care planning: DNR/DNI    Repeat labs for today - pending

## 2019-04-10 NOTE — CHART NOTE - NSCHARTNOTEFT_GEN_A_CORE
Nutrition Follow Up Note  Hospital Course (Per Electronic Medical Record):   Source: Medical Record [X] Patient [X] Family [ ] Nursing Staff [ ]     Diet: Regular, Consistent Carbohydrate diet with no snack  Pt reports fair PO intake, PO intake % per chart. Pt denies any recent chewing/swallowing difficulties and no recent N/V. Pt reports staying adequately hydrated throughout the day. Recommend supplements to patient 2/2 to continued poor PO intake but pt denied supplements again stating "she does not want them and could stand to lose some weight". Educated pt on the need for increased calories and protein while healing and will continue to follow PO intake. Pt states prior education on Consistent Carbohydrate diet for T2DM.    Enteral/Parenteral Nutrition: N/A    Current Weight: 139.9lb on 4/10  Edema: none noted  Skin: no pressure ulcer noted per chart    Pertinent Medications: MEDICATIONS  (STANDING):  ALBUTerol    90 MICROgram(s) HFA Inhaler 1 Puff(s) Inhalation every 4 hours  ALBUTerol/ipratropium for Nebulization 3 milliLiter(s) Nebulizer every 6 hours  atorvastatin 20 milliGRAM(s) Oral at bedtime  bisacodyl 5 milliGRAM(s) Oral at bedtime  chlordiazePOXIDE   Oral   chlordiazePOXIDE 25 milliGRAM(s) Oral daily  cholecalciferol 1000 Unit(s) Oral daily  dextrose 5%. 1000 milliLiter(s) (50 mL/Hr) IV Continuous <Continuous>  dextrose 50% Injectable 12.5 Gram(s) IV Push once  dextrose 50% Injectable 25 Gram(s) IV Push once  dextrose 50% Injectable 25 Gram(s) IV Push once  docusate sodium 100 milliGRAM(s) Oral three times a day  enoxaparin Injectable 40 milliGRAM(s) SubCutaneous every 24 hours  folic acid 1 milliGRAM(s) Oral daily  furosemide    Tablet 20 milliGRAM(s) Oral daily  insulin lispro (HumaLOG) corrective regimen sliding scale   SubCutaneous three times a day before meals  insulin lispro (HumaLOG) corrective regimen sliding scale   SubCutaneous at bedtime  magnesium oxide 400 milliGRAM(s) Oral daily  metoprolol succinate  milliGRAM(s) Oral daily  multivitamin 1 Tablet(s) Oral daily  pantoprazole    Tablet 40 milliGRAM(s) Oral before breakfast  polyethylene glycol 3350 17 Gram(s) Oral daily  senna 2 Tablet(s) Oral at bedtime  tiotropium 18 MICROgram(s) Capsule 1 Capsule(s) Inhalation daily    MEDICATIONS  (PRN):  acetaminophen   Tablet .. 650 milliGRAM(s) Oral every 6 hours PRN Temp greater or equal to 38C (100.4F), Mild Pain (1 - 3)  dextrose 40% Gel 15 Gram(s) Oral once PRN Blood Glucose LESS THAN 70 milliGRAM(s)/deciliter  glucagon  Injectable 1 milliGRAM(s) IntraMuscular once PRN Glucose LESS THAN 70 milligrams/deciliter  LORazepam   Injectable 1 milliGRAM(s) IV Push every 2 hours PRN CIWA-Ar score increase by 2 points and a total score of 7 or less  morphine  - Injectable 2 milliGRAM(s) IV Push every 8 hours PRN Severe Pain (7 - 10)  ondansetron Injectable 4 milliGRAM(s) IV Push every 8 hours PRN Nausea and/or Vomiting  oxyCODONE    IR 5 milliGRAM(s) Oral every 4 hours PRN Moderate Pain (4 - 6)      Pertinent Labs:  04-09 Na136 mmol/L Glu 116 mg/dL<H> K+ 3.9 mmol/L Cr  1.18 mg/dL BUN 44 mg/dL<H> 04-05 Phos 3.1 mg/dL 04-06 Alb 3.1 g/dL<L> 04-05 [id number] 5.7 %<H>    POCT: 107-213H (4/8-4/9)    Estimated Needs:   [X] No Change since Previous Assessment      Previous Nutrition Diagnosis:   Moderate Malnutrition    Nutrition Diagnosis is [X] Ongoing - will follow up per department protocol      New Nutrition Diagnosis: [X] Not Applicable    Interventions:   1. Recommend continue nutrition plan of care      Monitoring & Evaluation:   [X] Weights   [X] PO Intake   [X] Follow Up (Per Protocol)  [X] Tolerance to Diet Prescription   [X] Other: Labs & POCT    Remains Available.  Damon Diaz Dietetic Intern

## 2019-04-10 NOTE — OCCUPATIONAL THERAPY INITIAL EVALUATION ADULT - RANGE OF MOTION EXAMINATION, UPPER EXTREMITY
Left UE Passive ROM was WNL (within normal limits)/Right UE Passive ROM was WNL (within normal limits)

## 2019-04-10 NOTE — CONSULT NOTE ADULT - ASSESSMENT
This is a 75 y/o female with HTN, Asthma/COPD, pulmonary edema, ETOH abuse, DM with recent mechanical fall resulting in L2 vertebral fracture with gait impairments.     Recommend: Continue bedside PT. Add OT. Try to wean off of IV morphine. Continue prn oxycodone and prn tylenol. Consider adding lidocaine patches to back during daytime hours. May use ice packs over lidocaine patches. Heat packs may be helpful, but do not use over lidocaine patches.   At this time, patient would be best served in a [facility] facility to work on pain managment, increasing endurance and strength, and monitoring pulm status. OT will eval and provide insight into ADL deficits. Patient could not tolerate a 3 hr a day, 5 day a week intensive acute IPR program at this time. This is a 75 y/o female with HTN, Asthma/COPD, pulmonary edema, ETOH abuse, DM with recent mechanical fall resulting in L2 vertebral fracture with gait impairments.     Recommend: Continue bedside PT. Add OT. Try to wean off of IV morphine. Continue prn oxycodone and prn tylenol. Consider adding lidocaine patches to back during daytime hours. May use ice packs over lidocaine patches. Heat packs may be helpful, but do not use over lidocaine patches. Patient now agrees to try LSO brace for comfort.   At this time, patient would be best served in a [facility] facility to work on pain managment, increasing endurance and strength, and monitoring pulm status. OT will eval and provide insight into ADL deficits. Patient could not tolerate a 3 hr a day, 5 day a week intensive acute IPR program at this time.

## 2019-04-11 LAB
ANION GAP SERPL CALC-SCNC: 6 MMOL/L — SIGNIFICANT CHANGE UP (ref 5–17)
BUN SERPL-MCNC: 28 MG/DL — HIGH (ref 7–23)
CALCIUM SERPL-MCNC: 8.7 MG/DL — SIGNIFICANT CHANGE UP (ref 8.4–10.5)
CHLORIDE SERPL-SCNC: 100 MMOL/L — SIGNIFICANT CHANGE UP (ref 96–108)
CO2 SERPL-SCNC: 34 MMOL/L — HIGH (ref 22–31)
CREAT SERPL-MCNC: 0.97 MG/DL — SIGNIFICANT CHANGE UP (ref 0.5–1.3)
GLUCOSE BLDC GLUCOMTR-MCNC: 128 MG/DL — HIGH (ref 70–99)
GLUCOSE BLDC GLUCOMTR-MCNC: 128 MG/DL — HIGH (ref 70–99)
GLUCOSE BLDC GLUCOMTR-MCNC: 132 MG/DL — HIGH (ref 70–99)
GLUCOSE BLDC GLUCOMTR-MCNC: 98 MG/DL — SIGNIFICANT CHANGE UP (ref 70–99)
GLUCOSE SERPL-MCNC: 106 MG/DL — HIGH (ref 70–99)
POTASSIUM SERPL-MCNC: 4.1 MMOL/L — SIGNIFICANT CHANGE UP (ref 3.5–5.3)
POTASSIUM SERPL-SCNC: 4.1 MMOL/L — SIGNIFICANT CHANGE UP (ref 3.5–5.3)
PROCALCITONIN SERPL-MCNC: 0.08 NG/ML — SIGNIFICANT CHANGE UP
SODIUM SERPL-SCNC: 140 MMOL/L — SIGNIFICANT CHANGE UP (ref 135–145)

## 2019-04-11 PROCEDURE — 99233 SBSQ HOSP IP/OBS HIGH 50: CPT

## 2019-04-11 PROCEDURE — 93970 EXTREMITY STUDY: CPT | Mod: 26

## 2019-04-11 RX ORDER — BUDESONIDE AND FORMOTEROL FUMARATE DIHYDRATE 160; 4.5 UG/1; UG/1
2 AEROSOL RESPIRATORY (INHALATION)
Qty: 0 | Refills: 0 | Status: DISCONTINUED | OUTPATIENT
Start: 2019-04-11 | End: 2019-04-12

## 2019-04-11 RX ORDER — IPRATROPIUM/ALBUTEROL SULFATE 18-103MCG
3 AEROSOL WITH ADAPTER (GRAM) INHALATION EVERY 6 HOURS
Qty: 0 | Refills: 0 | Status: DISCONTINUED | OUTPATIENT
Start: 2019-04-11 | End: 2019-04-12

## 2019-04-11 RX ORDER — OXYCODONE HYDROCHLORIDE 5 MG/1
5 TABLET ORAL EVERY 4 HOURS
Qty: 0 | Refills: 0 | Status: DISCONTINUED | OUTPATIENT
Start: 2019-04-11 | End: 2019-04-12

## 2019-04-11 RX ORDER — TIOTROPIUM BROMIDE 18 UG/1
1 CAPSULE ORAL; RESPIRATORY (INHALATION) DAILY
Qty: 0 | Refills: 0 | Status: DISCONTINUED | OUTPATIENT
Start: 2019-04-11 | End: 2019-04-12

## 2019-04-11 RX ORDER — LIDOCAINE 4 G/100G
1 CREAM TOPICAL DAILY
Qty: 0 | Refills: 0 | Status: DISCONTINUED | OUTPATIENT
Start: 2019-04-11 | End: 2019-04-12

## 2019-04-11 RX ADMIN — Medication 100 MILLIGRAM(S): at 22:41

## 2019-04-11 RX ADMIN — Medication 100 MILLIGRAM(S): at 05:04

## 2019-04-11 RX ADMIN — PANTOPRAZOLE SODIUM 40 MILLIGRAM(S): 20 TABLET, DELAYED RELEASE ORAL at 07:33

## 2019-04-11 RX ADMIN — SENNA PLUS 2 TABLET(S): 8.6 TABLET ORAL at 22:41

## 2019-04-11 RX ADMIN — Medication 3 MILLILITER(S): at 09:27

## 2019-04-11 RX ADMIN — Medication 3 MILLILITER(S): at 21:17

## 2019-04-11 RX ADMIN — POLYETHYLENE GLYCOL 3350 17 GRAM(S): 17 POWDER, FOR SOLUTION ORAL at 11:13

## 2019-04-11 RX ADMIN — Medication 3 MILLILITER(S): at 00:18

## 2019-04-11 RX ADMIN — OXYCODONE HYDROCHLORIDE 5 MILLIGRAM(S): 5 TABLET ORAL at 13:15

## 2019-04-11 RX ADMIN — LIDOCAINE 1 PATCH: 4 CREAM TOPICAL at 11:38

## 2019-04-11 RX ADMIN — Medication 1000 UNIT(S): at 11:13

## 2019-04-11 RX ADMIN — LIDOCAINE 1 PATCH: 4 CREAM TOPICAL at 23:18

## 2019-04-11 RX ADMIN — Medication 1 TABLET(S): at 11:14

## 2019-04-11 RX ADMIN — MORPHINE SULFATE 2 MILLIGRAM(S): 50 CAPSULE, EXTENDED RELEASE ORAL at 08:25

## 2019-04-11 RX ADMIN — Medication 5 MILLIGRAM(S): at 22:40

## 2019-04-11 RX ADMIN — Medication 3 MILLILITER(S): at 15:22

## 2019-04-11 RX ADMIN — BUDESONIDE AND FORMOTEROL FUMARATE DIHYDRATE 2 PUFF(S): 160; 4.5 AEROSOL RESPIRATORY (INHALATION) at 21:21

## 2019-04-11 RX ADMIN — OXYCODONE HYDROCHLORIDE 5 MILLIGRAM(S): 5 TABLET ORAL at 18:17

## 2019-04-11 RX ADMIN — OXYCODONE HYDROCHLORIDE 5 MILLIGRAM(S): 5 TABLET ORAL at 23:50

## 2019-04-11 RX ADMIN — MAGNESIUM OXIDE 400 MG ORAL TABLET 400 MILLIGRAM(S): 241.3 TABLET ORAL at 11:13

## 2019-04-11 RX ADMIN — Medication 20 MILLIGRAM(S): at 05:04

## 2019-04-11 RX ADMIN — Medication 3 MILLILITER(S): at 04:55

## 2019-04-11 RX ADMIN — OXYCODONE HYDROCHLORIDE 5 MILLIGRAM(S): 5 TABLET ORAL at 22:52

## 2019-04-11 RX ADMIN — ATORVASTATIN CALCIUM 20 MILLIGRAM(S): 80 TABLET, FILM COATED ORAL at 22:40

## 2019-04-11 RX ADMIN — MORPHINE SULFATE 2 MILLIGRAM(S): 50 CAPSULE, EXTENDED RELEASE ORAL at 08:08

## 2019-04-11 RX ADMIN — Medication 1 MILLIGRAM(S): at 11:13

## 2019-04-11 RX ADMIN — OXYCODONE HYDROCHLORIDE 5 MILLIGRAM(S): 5 TABLET ORAL at 12:41

## 2019-04-11 RX ADMIN — LIDOCAINE 1 PATCH: 4 CREAM TOPICAL at 18:41

## 2019-04-11 NOTE — PROGRESS NOTE ADULT - ASSESSMENT
74F hx of ETOH use, CAD/CABGx4, AAA repair, HTN, HLD, T2DM pw s/p mechanical fall with acute L2 vertebral fx. Course complicated by CHF exacerbation and alcohol withdrawal (See below for details)    #Acute L2 compression fracture s/p mechanical fall  -PT - recommend rehab  -OT consulted  -PMR eval ordered - [facility] vs [facility] - decision pending clinical course  -LSO brace recommended - will order  -Pain control - d/c IV morphine, start oxycodone prn and add lidocaine patch    #Acute pulmonary edema, suspect diastolic CHF exacerbation  #Left pleural effusion, persistent  #Acute (suspect on chronic) hypoxic respiratory failure  -EF is 51%  -Overall improved, now on PO lasix - resolved clinically (in fact, may have some degree of contraction alkalosis at this tiem)  -No more IVF at this time, would continue low dose lasix due to pleural effusion  -Dr. Sagastume consulted - will likely need lung U/S to quantify size of left pleural effusion - defer if any role in thoracentesis  -Eventual PFTs when at baseline to see if has underlying COPD vs asthma  -Negative PCT, afebrile - no indication for current infectious process     #Hyperglycemia, steroid induced - resolved  #Leukocytosis, steroid induced - resolved  #History of diabetes, controlled, A1C 5.7, on metformin at home  -ISS  -now off steroids, this is not a severe asthma or COPD exacerbation    #Alcohol withdrawal with DTs  -Improved, on Librium, with standing taper - completed    #Essential HTN: stable on current regimen    #GABY: Cr ~0.6 --- now Cr ~1.2 -- hold ARB x 1-2 days, could partially be due to lasix, Cr is downtrending with lasix    #constipation: continue laxatives especially while patient on narcotics    DVT ppx: Lovenox    #Advanced Care planning: DNR/DNI 74F hx of ETOH use, CAD/CABGx4, AAA repair, HTN, HLD, T2DM pw s/p mechanical fall with acute L2 vertebral fx. Course complicated by CHF exacerbation and alcohol withdrawal (See below for details)    #Acute L2 vertebral fracture s/p mechanical fall  -PT - recommend rehab  -OT consulted  -PMR eval ordered - [facility] vs [facility] - decision pending clinical course  -LSO brace recommended - will order  -Pain control - d/c IV morphine, start oxycodone prn and add lidocaine patch    #Acute pulmonary edema, suspect diastolic CHF exacerbation  #Left pleural effusion, persistent  #Acute (suspect on chronic) hypoxic respiratory failure  -EF is 51%  -Overall improved, now on PO lasix - resolved clinically (in fact, may have some degree of contraction alkalosis at this tiem)  -No more IVF at this time, would continue low dose lasix due to pleural effusion  -Dr. Sagastume consulted - will likely need lung U/S to quantify size of left pleural effusion - defer if any role in thoracentesis  -Eventual PFTs when at baseline to see if has underlying COPD vs asthma  -Negative PCT, afebrile - no indication for current infectious process     #Hyperglycemia, steroid induced - resolved  #Leukocytosis, steroid induced - resolved  #History of diabetes, controlled, A1C 5.7, on metformin at home  -ISS  -now off steroids, this is not a severe asthma or COPD exacerbation    #Alcohol withdrawal with DTs  -Improved, on Librium, with standing taper - completed    #Essential HTN: stable on current regimen    #GABY: Cr ~0.6 --- now Cr ~1.2 -- hold ARB x 1-2 days, could partially be due to lasix, Cr is downtrending with lasix    #constipation: continue laxatives especially while patient on narcotics    DVT ppx: Lovenox    #Advanced Care planning: DNR/DNI

## 2019-04-11 NOTE — CONSULT NOTE ADULT - ASSESSMENT
Physical Examination:  GENERAL:               Alert, Oriented, No acute distress.    HEENT:                   No JVD, Dry MM  PULM:                     Bilateral air entry, Diminished b/s, no significant sputum production, trace Rales, No Rhonchi, trace Wheezing  CVS:                         S1, S2,  + Murmur  ABD:                        Soft, nondistended, nontender, normoactive bowel sounds,   EXT:                         No edema, nontender, No Cyanosis   Vascular:                Warm Extremities, Normal Capillary refill,   SKIN:                       Warm and well perfused, no rashes noted.   NEURO:                  Alert, oriented, interactive, nonfocal, follows commands  PSYC:                      Calm, + Insight.    Assessment  Mild Hypoxia suspect due to continued vascular congestion with Acute on chronic diastolic CHF    But immobility could have lead to atelectasis that is under appreciated   Chronic COPD likely   Fall with L2 verterbral fracture  h/o Lung cancer  ETOH abuse  Ex nicotine use  CAD s/p CABG  DM2    Plan  add incentive spirometry, OOB, PT  check LE Duplex r/o DVT as pt less mobile and has history of cancer  Spirea Symbicort,   Duoneb PRN  Keep negative balance with Lasix  D/W Dr. Miguel  will follow Physical Examination:  GENERAL:               Alert, Oriented, No acute distress.    HEENT:                   No JVD, Dry MM  PULM:                     Bilateral air entry, Diminished b/s, no significant sputum production, trace Rales, No Rhonchi, trace Wheezing  CVS:                         S1, S2,  + Murmur  ABD:                        Soft, nondistended, nontender, normoactive bowel sounds,   EXT:                         No edema, nontender, No Cyanosis   Vascular:                Warm Extremities, Normal Capillary refill,   SKIN:                       Warm and well perfused, no rashes noted.   NEURO:                  Alert, oriented, interactive, nonfocal, follows commands  PSYC:                      Calm, + Insight.    Assessment  Mild Hypoxia suspect due to continued vascular congestion with Acute on chronic diastolic CHF    But immobility could have lead to atelectasis that is under appreciated   Chronic COPD likely   Fall with L2 vertebral fracture  h/o Lung cancer  ETOH abuse  Ex nicotine use  CAD s/p CABG  DM2    Plan  add incentive spirometry, OOB, PT  check LE Duplex r/o DVT as pt less mobile and has history of cancer  Spirea Symbicort,   Duoneb PRN  nasal canula o2 to maintain sat > 88%    monitor o2 sat @ rest & ambulation daily on RA   Keep negative balance with Lasix  D/W Dr. Miguel  will follow

## 2019-04-11 NOTE — CONSULT NOTE ADULT - SUBJECTIVE AND OBJECTIVE BOX
PULMONARY CONSULT  Location of Patient : [facility] [address] ([facility])  Attending requesting Consult:Tony Miguel  Chief Complaint : Fall  Reason For consult : Hypoxia      Initial HPI on admission:  HPI:  74 year old female with h/o CAD s/p CABG x 4, Diastolic CHF, AAA s/p Repair, h/o Lung cancer, COPD - Ex Smoker, ETOH use, DM2, HTN, High chol who p/w Fall and had back injury  Workup noted to have Acute L2 vetrbral fracture. Hospital course complicated by CHF Exacerbation and ETOH withdrawal.  Pulm consult called today due to continued dyspnea and hypoxia.     BRIEF HOSPITAL COURSE:   At this time pt complain of intermittent wheezing   states SOB worse on excertion, rest is comfortable  states gets SOB off oxygen  + back pain  no palp, n/v, cp,       PAST MEDICAL & SURGICAL HISTORY:  Alcohol abuse  HTN (hypertension)  Asthma  DM (diabetes mellitus)  H/O shoulder surgery  S/P CABG x 4    Allergies    dust (Unknown)  latex (Unknown)  Originally Entered as [Unknown] reaction to [CATHAIR] (Unknown)  Originally Entered as [Unknown] reaction to [MILDEW] (Unknown)  Zosyn (Unknown)    Intolerances      FAMILY HISTORY:  No pertinent family history in first degree relatives: UNKNOWN    Social history:      Smoking: ex smoker     Drinking: active etoh     Drug use: Denies    Review of Systems:  CONSTITUTIONAL: No fever, No chills, +fatigue  EYES: No eye pain, No visual disturbances, No discharge  ENMT:  No difficulty hearing, No tinnitus, No vertigo; No sinus or throat pain  NECK: No pain or stiffness  RESPIRATORY: Per above  CARDIOVASCULAR: No chest pain, No palpitations, No dizziness, or No leg swelling  GASTROINTESTINAL: No abdominal or epigastric pain. No nausea, No vomiting, No hematemesis; No diarrhea or constipation. No melena, No hematochezia.  GENITOURINARY: No dysuria, No frequency, No hematuria, No incontinence  NEUROLOGICAL: No headaches, No memory loss, No loss of strength, No numbness, No tremors  SKIN: No itching, No burning, No rashes, No lesions   MUSCULOSKELETAL: No joint pain , ?  swelling; No muscle, back, No extremity pain  PSYCHIATRIC: No depression, No anxiety, No mood swings, No difficulty sleeping    Medications:  MEDICATIONS  (STANDING):  ALBUTerol    90 MICROgram(s) HFA Inhaler 1 Puff(s) Inhalation every 4 hours  ALBUTerol/ipratropium for Nebulization 3 milliLiter(s) Nebulizer every 6 hours  atorvastatin 20 milliGRAM(s) Oral at bedtime  bisacodyl 5 milliGRAM(s) Oral at bedtime  cholecalciferol 1000 Unit(s) Oral daily  dextrose 5%. 1000 milliLiter(s) (50 mL/Hr) IV Continuous <Continuous>  dextrose 50% Injectable 12.5 Gram(s) IV Push once  dextrose 50% Injectable 25 Gram(s) IV Push once  dextrose 50% Injectable 25 Gram(s) IV Push once  docusate sodium 100 milliGRAM(s) Oral three times a day  enoxaparin Injectable 40 milliGRAM(s) SubCutaneous every 24 hours  folic acid 1 milliGRAM(s) Oral daily  furosemide    Tablet 20 milliGRAM(s) Oral daily  insulin lispro (HumaLOG) corrective regimen sliding scale   SubCutaneous three times a day before meals  insulin lispro (HumaLOG) corrective regimen sliding scale   SubCutaneous at bedtime  lidocaine   Patch 1 Patch Transdermal daily  magnesium oxide 400 milliGRAM(s) Oral daily  metoprolol succinate  milliGRAM(s) Oral daily  multivitamin 1 Tablet(s) Oral daily  pantoprazole    Tablet 40 milliGRAM(s) Oral before breakfast  polyethylene glycol 3350 17 Gram(s) Oral daily  senna 2 Tablet(s) Oral at bedtime  tiotropium 18 MICROgram(s) Capsule 1 Capsule(s) Inhalation daily    MEDICATIONS  (PRN):  acetaminophen   Tablet .. 650 milliGRAM(s) Oral every 6 hours PRN Temp greater or equal to 38C (100.4F), Mild Pain (1 - 3)  dextrose 40% Gel 15 Gram(s) Oral once PRN Blood Glucose LESS THAN 70 milliGRAM(s)/deciliter  glucagon  Injectable 1 milliGRAM(s) IntraMuscular once PRN Glucose LESS THAN 70 milligrams/deciliter  ondansetron Injectable 4 milliGRAM(s) IV Push every 8 hours PRN Nausea and/or Vomiting  oxyCODONE    IR 5 milliGRAM(s) Oral every 4 hours PRN Moderate Pain (4 - 6)      Home Medications:  Last Order Reconciliation Date: Not Done  Alivio 600 mg oral tablet: 1 tab(s) orally every 6 hours  (09-29-18 @ 18:08)  enoxaparin: use for 7 days (04-05-19 @ 00:17)  furosemide 40 mg oral tablet: 1 tab(s) orally once a day (04-05-19 @ 04:03)   mg oral tablet: 1 tab(s) orally every 6 hours  (09-29-18 @ 18:08)  losartan 50 mg oral tablet: 1 tab(s) orally once a day (04-05-19 @ 00:40)  magnesium oxide 400 mg (241.3 mg elemental magnesium) oral tablet: 1 tab(s) orally once a day (04-05-19 @ 00:40)  metFORMIN 500 mg oral tablet: 1 tab(s) orally 2 times a day (04-05-19 @ 00:40)  oxyCODONE 10 mg oral tablet: 1 tab(s) orally every 4 hours, As needed, Moderate Pain (4 - 6) (04-05-19 @ 00:17)  oxyCODONE 5 mg oral tablet: 1 tab(s) orally every 4 hours, As needed, Mild Pain (1 - 3) (04-05-19 @ 00:39)  Plavix 75 mg oral tablet: 1 tab(s) orally once a day (04-05-19 @ 04:03)  polyethylene glycol 3350 oral powder for reconstitution: 17 gram(s) orally once a day (04-05-19 @ 00:39)  senna oral tablet: 2 tab(s) orally once a day (at bedtime), As needed, Constipation (04-05-19 @ 00:17)  simvastatin: 40 milligram(s) orally once a day (at bedtime) (04-05-19 @ 00:40)  Toprol-XL: 100 milligram(s) orally once a day (04-05-19 @ 00:40)      LABS:                        13.2   8.3   )-----------( 234      ( 10 Apr 2019 11:25 )             40.2     04-11    140  |  100  |  28<H>  ----------------------------<  106<H>  4.1   |  34<H>  |  0.97    Ca    8.7      11 Apr 2019 05:45  Serum Pro-Brain Natriuretic Peptide: 3642 pg/mL (04-10-19 @ 11:25)        RADIOLOGY  CXR:  4/10 increased vascular markings b/l      CHF    CT:    ECHO:    < from: TTE Echo Complete w/Doppler (04.09.19 @ 10:28) >  Summary:   1. Left ventricular ejection fraction, by visual estimation, is 51%.   2. Normal global left ventricular systolic function.   3. Spectral Doppler shows pseudonormal pattern of left ventricular myocardial filling (Grade II diastolic dysfunction).   4. Thickening and calcification of the anterior and posterior mitral valve leaflets.   5. Sclerotic aortic valve with normal opening.   6. Estimated pulmonary artery systolic pressure is 48.2 mmHg assuming a right atrial pressure of 10 mmHg, which is consistent with mild pulmonary hypertension.   7. Pulmonary hypertension is present.   8. LA volume Index is 48.0 ml/m² ml/m2.    < end of copied text >    US Chest Done by Me:  B/L B lines diffuse  minimal/trace b/l pl effusions     VITALS:  T(C): 36.4 (04-11-19 @ 15:07), Max: 37.1 (04-10-19 @ 17:52)  T(F): 97.5 (04-11-19 @ 15:07), Max: 98.7 (04-10-19 @ 17:52)  HR: 77 (04-11-19 @ 15:07) (72 - 83)  BP: 100/53 (04-11-19 @ 15:07) (95/51 - 122/80)  BP(mean): --  ABP: --  ABP(mean): --  RR: 15 (04-11-19 @ 15:07) (15 - 15)  SpO2: 97% (04-11-19 @ 15:23) (85% - 100%)  CVP(mm Hg): --  CVP(cm H2O): --    Ins and Outs     04-10-19 @ 07:01  -  04-11-19 @ 07:00  --------------------------------------------------------  IN: 1320 mL / OUT: 0 mL / NET: 1320 mL    04-11-19 @ 07:01  -  04-11-19 @ 15:38  --------------------------------------------------------  IN: 1080 mL / OUT: 2 mL / NET: 1078 mL          Weight (kg): 63.5 (04-11-19 @ 05:01)

## 2019-04-12 ENCOUNTER — TRANSCRIPTION ENCOUNTER (OUTPATIENT)
Age: 74
End: 2019-04-12

## 2019-04-12 VITALS
RESPIRATION RATE: 14 BRPM | SYSTOLIC BLOOD PRESSURE: 105 MMHG | OXYGEN SATURATION: 96 % | TEMPERATURE: 98 F | HEART RATE: 80 BPM | DIASTOLIC BLOOD PRESSURE: 60 MMHG

## 2019-04-12 LAB
ANION GAP SERPL CALC-SCNC: 7 MMOL/L — SIGNIFICANT CHANGE UP (ref 5–17)
BASOPHILS # BLD AUTO: 0.1 K/UL — SIGNIFICANT CHANGE UP (ref 0–0.2)
BASOPHILS NFR BLD AUTO: 0.9 % — SIGNIFICANT CHANGE UP (ref 0–2)
BUN SERPL-MCNC: 34 MG/DL — HIGH (ref 7–23)
CALCIUM SERPL-MCNC: 8.5 MG/DL — SIGNIFICANT CHANGE UP (ref 8.4–10.5)
CHLORIDE SERPL-SCNC: 102 MMOL/L — SIGNIFICANT CHANGE UP (ref 96–108)
CO2 SERPL-SCNC: 32 MMOL/L — HIGH (ref 22–31)
CREAT SERPL-MCNC: 0.88 MG/DL — SIGNIFICANT CHANGE UP (ref 0.5–1.3)
EOSINOPHIL # BLD AUTO: 0.3 K/UL — SIGNIFICANT CHANGE UP (ref 0–0.5)
EOSINOPHIL NFR BLD AUTO: 3.5 % — SIGNIFICANT CHANGE UP (ref 0–6)
GLUCOSE BLDC GLUCOMTR-MCNC: 129 MG/DL — HIGH (ref 70–99)
GLUCOSE BLDC GLUCOMTR-MCNC: 160 MG/DL — HIGH (ref 70–99)
GLUCOSE SERPL-MCNC: 144 MG/DL — HIGH (ref 70–99)
HCT VFR BLD CALC: 38.4 % — SIGNIFICANT CHANGE UP (ref 34.5–45)
HGB BLD-MCNC: 12.3 G/DL — SIGNIFICANT CHANGE UP (ref 11.5–15.5)
LYMPHOCYTES # BLD AUTO: 1.7 K/UL — SIGNIFICANT CHANGE UP (ref 1–3.3)
LYMPHOCYTES # BLD AUTO: 18.3 % — SIGNIFICANT CHANGE UP (ref 13–44)
MAGNESIUM SERPL-MCNC: 1.2 MG/DL — LOW (ref 1.6–2.6)
MCHC RBC-ENTMCNC: 31.9 GM/DL — LOW (ref 32–36)
MCHC RBC-ENTMCNC: 32.6 PG — SIGNIFICANT CHANGE UP (ref 27–34)
MCV RBC AUTO: 102 FL — HIGH (ref 80–100)
MONOCYTES # BLD AUTO: 0.9 K/UL — SIGNIFICANT CHANGE UP (ref 0–0.9)
MONOCYTES NFR BLD AUTO: 9.6 % — HIGH (ref 1–9)
NEUTROPHILS # BLD AUTO: 6.2 K/UL — SIGNIFICANT CHANGE UP (ref 1.8–7.4)
NEUTROPHILS NFR BLD AUTO: 67.7 % — SIGNIFICANT CHANGE UP (ref 43–77)
PHOSPHATE SERPL-MCNC: 4.8 MG/DL — HIGH (ref 2.5–4.5)
PLATELET # BLD AUTO: 219 K/UL — SIGNIFICANT CHANGE UP (ref 150–400)
POTASSIUM SERPL-MCNC: 4 MMOL/L — SIGNIFICANT CHANGE UP (ref 3.5–5.3)
POTASSIUM SERPL-SCNC: 4 MMOL/L — SIGNIFICANT CHANGE UP (ref 3.5–5.3)
RBC # BLD: 3.77 M/UL — LOW (ref 3.8–5.2)
RBC # FLD: 12.5 % — SIGNIFICANT CHANGE UP (ref 10.3–14.5)
SODIUM SERPL-SCNC: 141 MMOL/L — SIGNIFICANT CHANGE UP (ref 135–145)
WBC # BLD: 9.1 K/UL — SIGNIFICANT CHANGE UP (ref 3.8–10.5)
WBC # FLD AUTO: 9.1 K/UL — SIGNIFICANT CHANGE UP (ref 3.8–10.5)

## 2019-04-12 PROCEDURE — 93306 TTE W/DOPPLER COMPLETE: CPT

## 2019-04-12 PROCEDURE — 70450 CT HEAD/BRAIN W/O DYE: CPT

## 2019-04-12 PROCEDURE — 83880 ASSAY OF NATRIURETIC PEPTIDE: CPT

## 2019-04-12 PROCEDURE — 72125 CT NECK SPINE W/O DYE: CPT

## 2019-04-12 PROCEDURE — 82962 GLUCOSE BLOOD TEST: CPT

## 2019-04-12 PROCEDURE — 82550 ASSAY OF CK (CPK): CPT

## 2019-04-12 PROCEDURE — 83036 HEMOGLOBIN GLYCOSYLATED A1C: CPT

## 2019-04-12 PROCEDURE — 97535 SELF CARE MNGMENT TRAINING: CPT

## 2019-04-12 PROCEDURE — 82533 TOTAL CORTISOL: CPT

## 2019-04-12 PROCEDURE — 97161 PT EVAL LOW COMPLEX 20 MIN: CPT

## 2019-04-12 PROCEDURE — 84100 ASSAY OF PHOSPHORUS: CPT

## 2019-04-12 PROCEDURE — 94760 N-INVAS EAR/PLS OXIMETRY 1: CPT

## 2019-04-12 PROCEDURE — 71045 X-RAY EXAM CHEST 1 VIEW: CPT

## 2019-04-12 PROCEDURE — 97165 OT EVAL LOW COMPLEX 30 MIN: CPT

## 2019-04-12 PROCEDURE — 80053 COMPREHEN METABOLIC PANEL: CPT

## 2019-04-12 PROCEDURE — 93970 EXTREMITY STUDY: CPT

## 2019-04-12 PROCEDURE — 93005 ELECTROCARDIOGRAM TRACING: CPT

## 2019-04-12 PROCEDURE — 83735 ASSAY OF MAGNESIUM: CPT

## 2019-04-12 PROCEDURE — 99285 EMERGENCY DEPT VISIT HI MDM: CPT | Mod: 25

## 2019-04-12 PROCEDURE — 80048 BASIC METABOLIC PNL TOTAL CA: CPT

## 2019-04-12 PROCEDURE — 72131 CT LUMBAR SPINE W/O DYE: CPT

## 2019-04-12 PROCEDURE — 84132 ASSAY OF SERUM POTASSIUM: CPT

## 2019-04-12 PROCEDURE — 96374 THER/PROPH/DIAG INJ IV PUSH: CPT

## 2019-04-12 PROCEDURE — 84145 PROCALCITONIN (PCT): CPT

## 2019-04-12 PROCEDURE — 99239 HOSP IP/OBS DSCHRG MGMT >30: CPT

## 2019-04-12 PROCEDURE — 80307 DRUG TEST PRSMV CHEM ANLYZR: CPT

## 2019-04-12 PROCEDURE — 84443 ASSAY THYROID STIM HORMONE: CPT

## 2019-04-12 PROCEDURE — 94640 AIRWAY INHALATION TREATMENT: CPT

## 2019-04-12 PROCEDURE — 36415 COLL VENOUS BLD VENIPUNCTURE: CPT

## 2019-04-12 PROCEDURE — 84484 ASSAY OF TROPONIN QUANT: CPT

## 2019-04-12 PROCEDURE — 96361 HYDRATE IV INFUSION ADD-ON: CPT

## 2019-04-12 PROCEDURE — 85027 COMPLETE CBC AUTOMATED: CPT

## 2019-04-12 PROCEDURE — 86803 HEPATITIS C AB TEST: CPT

## 2019-04-12 RX ORDER — MAGNESIUM OXIDE 400 MG ORAL TABLET 241.3 MG
1 TABLET ORAL
Qty: 0 | Refills: 0 | DISCHARGE
Start: 2019-04-12

## 2019-04-12 RX ORDER — OXYCODONE HYDROCHLORIDE 5 MG/1
1 TABLET ORAL
Qty: 0 | Refills: 0 | DISCHARGE
Start: 2019-04-12

## 2019-04-12 RX ORDER — ACETAMINOPHEN 500 MG
2 TABLET ORAL
Qty: 0 | Refills: 0 | DISCHARGE
Start: 2019-04-12

## 2019-04-12 RX ORDER — SENNA PLUS 8.6 MG/1
2 TABLET ORAL
Qty: 0 | Refills: 0 | DISCHARGE
Start: 2019-04-12

## 2019-04-12 RX ORDER — LOSARTAN POTASSIUM 100 MG/1
1 TABLET, FILM COATED ORAL
Qty: 0 | Refills: 0 | COMMUNITY

## 2019-04-12 RX ORDER — METOPROLOL TARTRATE 50 MG
1 TABLET ORAL
Qty: 0 | Refills: 0 | DISCHARGE
Start: 2019-04-12

## 2019-04-12 RX ORDER — FUROSEMIDE 40 MG
1 TABLET ORAL
Qty: 0 | Refills: 0 | DISCHARGE
Start: 2019-04-12

## 2019-04-12 RX ORDER — POLYETHYLENE GLYCOL 3350 17 G/17G
17 POWDER, FOR SOLUTION ORAL
Qty: 0 | Refills: 0 | DISCHARGE
Start: 2019-04-12

## 2019-04-12 RX ORDER — MAGNESIUM SULFATE 500 MG/ML
2 VIAL (ML) INJECTION ONCE
Qty: 0 | Refills: 0 | Status: DISCONTINUED | OUTPATIENT
Start: 2019-04-12 | End: 2019-04-12

## 2019-04-12 RX ORDER — FOLIC ACID 0.8 MG
1 TABLET ORAL
Qty: 0 | Refills: 0 | DISCHARGE
Start: 2019-04-12

## 2019-04-12 RX ORDER — MAGNESIUM SULFATE 500 MG/ML
1 VIAL (ML) INJECTION
Qty: 0 | Refills: 0 | Status: COMPLETED | OUTPATIENT
Start: 2019-04-12 | End: 2019-04-12

## 2019-04-12 RX ORDER — CHOLECALCIFEROL (VITAMIN D3) 125 MCG
1000 CAPSULE ORAL
Qty: 0 | Refills: 0 | DISCHARGE
Start: 2019-04-12

## 2019-04-12 RX ORDER — IPRATROPIUM/ALBUTEROL SULFATE 18-103MCG
3 AEROSOL WITH ADAPTER (GRAM) INHALATION
Qty: 0 | Refills: 0 | DISCHARGE
Start: 2019-04-12

## 2019-04-12 RX ORDER — METOPROLOL TARTRATE 50 MG
100 TABLET ORAL
Qty: 0 | Refills: 0 | COMMUNITY

## 2019-04-12 RX ORDER — DOCUSATE SODIUM 100 MG
1 CAPSULE ORAL
Qty: 0 | Refills: 0 | DISCHARGE
Start: 2019-04-12

## 2019-04-12 RX ORDER — LIDOCAINE 4 G/100G
1 CREAM TOPICAL
Qty: 0 | Refills: 0 | DISCHARGE
Start: 2019-04-12

## 2019-04-12 RX ADMIN — Medication 2: at 12:20

## 2019-04-12 RX ADMIN — LIDOCAINE 1 PATCH: 4 CREAM TOPICAL at 11:20

## 2019-04-12 RX ADMIN — Medication 1000 UNIT(S): at 06:20

## 2019-04-12 RX ADMIN — Medication 100 MILLIGRAM(S): at 06:20

## 2019-04-12 RX ADMIN — PANTOPRAZOLE SODIUM 40 MILLIGRAM(S): 20 TABLET, DELAYED RELEASE ORAL at 06:23

## 2019-04-12 RX ADMIN — OXYCODONE HYDROCHLORIDE 5 MILLIGRAM(S): 5 TABLET ORAL at 09:25

## 2019-04-12 RX ADMIN — BUDESONIDE AND FORMOTEROL FUMARATE DIHYDRATE 2 PUFF(S): 160; 4.5 AEROSOL RESPIRATORY (INHALATION) at 10:11

## 2019-04-12 RX ADMIN — MAGNESIUM OXIDE 400 MG ORAL TABLET 400 MILLIGRAM(S): 241.3 TABLET ORAL at 11:21

## 2019-04-12 RX ADMIN — Medication 1 MILLIGRAM(S): at 11:19

## 2019-04-12 RX ADMIN — POLYETHYLENE GLYCOL 3350 17 GRAM(S): 17 POWDER, FOR SOLUTION ORAL at 11:21

## 2019-04-12 RX ADMIN — Medication 1 TABLET(S): at 11:21

## 2019-04-12 RX ADMIN — Medication 20 MILLIGRAM(S): at 06:20

## 2019-04-12 RX ADMIN — Medication 100 GRAM(S): at 11:19

## 2019-04-12 RX ADMIN — Medication 1000 UNIT(S): at 11:20

## 2019-04-12 RX ADMIN — TIOTROPIUM BROMIDE 1 CAPSULE(S): 18 CAPSULE ORAL; RESPIRATORY (INHALATION) at 10:12

## 2019-04-12 RX ADMIN — OXYCODONE HYDROCHLORIDE 5 MILLIGRAM(S): 5 TABLET ORAL at 08:25

## 2019-04-12 RX ADMIN — Medication 100 GRAM(S): at 10:21

## 2019-04-12 NOTE — DISCHARGE NOTE PROVIDER - PROVIDER TOKENS
FREE:[LAST:[Dr. Patel],FIRST:[Rajiv],PHONE:[(   )    -],FAX:[(   )    -]] FREE:[LAST:[Dr. Patel],FIRST:[Rajiv],PHONE:[(   )    -],FAX:[(   )    -]],PROVIDER:[TOKEN:[[id number]]]

## 2019-04-12 NOTE — DISCHARGE NOTE NURSING/CASE MANAGEMENT/SOCIAL WORK - NSDCPEPT PROEDHF_GEN_ALL_CORE
Report signs and symptoms to primary care provider/Low salt diet/Call primary care provider for follow up after discharge/Monitor weight daily/Activities as tolerated

## 2019-04-12 NOTE — PROGRESS NOTE ADULT - SUBJECTIVE AND OBJECTIVE BOX
Follow-up Pulmonary Progress Note  Chief Complaint : Collapsed vertebra, not elsewhere classified, site unspecified, initial encounter for fracture    pt feels well  noted continued hypoxia 84-88 RA rest  pt not wheezing, comfortable. no respiratory complaints.    Allergies :dust (Unknown)  latex (Unknown)  Originally Entered as [Unknown] reaction to [CATHAIR] (Unknown)  Originally Entered as [Unknown] reaction to [MILDEW] (Unknown)  Zosyn (Unknown)      PAST MEDICAL & SURGICAL HISTORY:  Alcohol abuse  HTN (hypertension)  Asthma  DM (diabetes mellitus)  H/O shoulder surgery  S/P CABG x 4      Medications:  MEDICATIONS  (STANDING):  ALBUTerol    90 MICROgram(s) HFA Inhaler 1 Puff(s) Inhalation every 4 hours  atorvastatin 20 milliGRAM(s) Oral at bedtime  bisacodyl 5 milliGRAM(s) Oral at bedtime  buDESOnide 160 MICROgram(s)/formoterol 4.5 MICROgram(s) Inhaler 2 Puff(s) Inhalation two times a day  cholecalciferol 1000 Unit(s) Oral daily  dextrose 5%. 1000 milliLiter(s) (50 mL/Hr) IV Continuous <Continuous>  dextrose 50% Injectable 12.5 Gram(s) IV Push once  dextrose 50% Injectable 25 Gram(s) IV Push once  dextrose 50% Injectable 25 Gram(s) IV Push once  docusate sodium 100 milliGRAM(s) Oral three times a day  enoxaparin Injectable 40 milliGRAM(s) SubCutaneous every 24 hours  folic acid 1 milliGRAM(s) Oral daily  furosemide    Tablet 20 milliGRAM(s) Oral daily  insulin lispro (HumaLOG) corrective regimen sliding scale   SubCutaneous three times a day before meals  insulin lispro (HumaLOG) corrective regimen sliding scale   SubCutaneous at bedtime  lidocaine   Patch 1 Patch Transdermal daily  magnesium oxide 400 milliGRAM(s) Oral daily  metoprolol succinate  milliGRAM(s) Oral daily  multivitamin 1 Tablet(s) Oral daily  pantoprazole    Tablet 40 milliGRAM(s) Oral before breakfast  polyethylene glycol 3350 17 Gram(s) Oral daily  senna 2 Tablet(s) Oral at bedtime  tiotropium 18 MICROgram(s) Capsule 1 Capsule(s) Inhalation daily  tiotropium 18 MICROgram(s) Capsule 1 Capsule(s) Inhalation daily    MEDICATIONS  (PRN):  acetaminophen   Tablet .. 650 milliGRAM(s) Oral every 6 hours PRN Temp greater or equal to 38C (100.4F), Mild Pain (1 - 3)  ALBUTerol/ipratropium for Nebulization 3 milliLiter(s) Nebulizer every 6 hours PRN Shortness of Breath and/or Wheezing  dextrose 40% Gel 15 Gram(s) Oral once PRN Blood Glucose LESS THAN 70 milliGRAM(s)/deciliter  glucagon  Injectable 1 milliGRAM(s) IntraMuscular once PRN Glucose LESS THAN 70 milligrams/deciliter  ondansetron Injectable 4 milliGRAM(s) IV Push every 8 hours PRN Nausea and/or Vomiting  oxyCODONE    IR 5 milliGRAM(s) Oral every 4 hours PRN Moderate Pain (4 - 6)      LABS:                        12.3   9.1   )-----------( 219      ( 12 Apr 2019 05:20 )             38.4     04-12    141  |  102  |  34<H>  ----------------------------<  144<H>  4.0   |  32<H>  |  0.88    Ca    8.5      12 Apr 2019 05:20  Phos  4.8     04-12  Mg     1.2     04-12    Procalcitonin, Serum: 0.08 ng/mL (04-11-19 @ 05:45)    Serum Pro-Brain Natriuretic Peptide: 3642 pg/mL (04-10-19 @ 11:25)    RADIOLOGY  < from: US Duplex Venous Lower Ext Complete, Bilateral (04.11.19 @ 19:05) >  No evidence of bilateral lower extremity deep venous thrombosis.    < end of copied text >    VITALS:  T(C): 36.7 (04-12-19 @ 06:09), Max: 36.7 (04-12-19 @ 06:09)  T(F): 98.1 (04-12-19 @ 06:09), Max: 98.1 (04-12-19 @ 06:09)  HR: 82 (04-12-19 @ 10:14) (72 - 82)  BP: 96/63 (04-12-19 @ 06:09) (96/63 - 101/50)  BP(mean): --  ABP: --  ABP(mean): --  RR: 14 (04-12-19 @ 08:30) (14 - 15)  SpO2: 95% (04-12-19 @ 10:14) (87% - 100%)  CVP(mm Hg): --  CVP(cm H2O): --    Ins and Outs     04-11-19 @ 07:01  -  04-12-19 @ 07:00  --------------------------------------------------------  IN: 1080 mL / OUT: 2 mL / NET: 1078 mL    04-12-19 @ 07:01  -  04-12-19 @ 12:23  --------------------------------------------------------  IN: 400 mL / OUT: 0 mL / NET: 400 mL          Weight (kg): 63.5 (04-12-19 @ 06:09)
Patient is a 74y old  Female who presents with a chief complaint of s/p fall (09 Apr 2019 13:29)    Patient seen and examined at bedside.    ALLERGIES:  dust (Unknown)  latex (Unknown)  Originally Entered as [Unknown] reaction to [CATHAIR] (Unknown)  Originally Entered as [Unknown] reaction to [MILDEW] (Unknown)  Zosyn (Unknown)    MEDICATIONS  (STANDING):  ALBUTerol    90 MICROgram(s) HFA Inhaler 1 Puff(s) Inhalation every 4 hours  ALBUTerol/ipratropium for Nebulization 3 milliLiter(s) Nebulizer every 6 hours  atorvastatin 20 milliGRAM(s) Oral at bedtime  bisacodyl 5 milliGRAM(s) Oral at bedtime  chlordiazePOXIDE   Oral   chlordiazePOXIDE 25 milliGRAM(s) Oral daily  cholecalciferol 1000 Unit(s) Oral daily  dextrose 5%. 1000 milliLiter(s) (50 mL/Hr) IV Continuous <Continuous>  dextrose 50% Injectable 12.5 Gram(s) IV Push once  dextrose 50% Injectable 25 Gram(s) IV Push once  dextrose 50% Injectable 25 Gram(s) IV Push once  docusate sodium 100 milliGRAM(s) Oral three times a day  enoxaparin Injectable 40 milliGRAM(s) SubCutaneous every 24 hours  folic acid 1 milliGRAM(s) Oral daily  furosemide    Tablet 20 milliGRAM(s) Oral daily  insulin lispro (HumaLOG) corrective regimen sliding scale   SubCutaneous three times a day before meals  insulin lispro (HumaLOG) corrective regimen sliding scale   SubCutaneous at bedtime  magnesium oxide 400 milliGRAM(s) Oral daily  metoprolol succinate  milliGRAM(s) Oral daily  multivitamin 1 Tablet(s) Oral daily  pantoprazole    Tablet 40 milliGRAM(s) Oral before breakfast  polyethylene glycol 3350 17 Gram(s) Oral daily  senna 2 Tablet(s) Oral at bedtime  tiotropium 18 MICROgram(s) Capsule 1 Capsule(s) Inhalation daily    MEDICATIONS  (PRN):  acetaminophen   Tablet .. 650 milliGRAM(s) Oral every 6 hours PRN Temp greater or equal to 38C (100.4F), Mild Pain (1 - 3)  dextrose 40% Gel 15 Gram(s) Oral once PRN Blood Glucose LESS THAN 70 milliGRAM(s)/deciliter  glucagon  Injectable 1 milliGRAM(s) IntraMuscular once PRN Glucose LESS THAN 70 milligrams/deciliter  LORazepam   Injectable 1 milliGRAM(s) IV Push every 2 hours PRN CIWA-Ar score increase by 2 points and a total score of 7 or less  morphine  - Injectable 2 milliGRAM(s) IV Push every 8 hours PRN Severe Pain (7 - 10)  ondansetron Injectable 4 milliGRAM(s) IV Push every 8 hours PRN Nausea and/or Vomiting  oxyCODONE    IR 5 milliGRAM(s) Oral every 4 hours PRN Moderate Pain (4 - 6)    Vital Signs Last 24 Hrs  T(F): 97.3 (10 Apr 2019 05:23), Max: 97.8 (09 Apr 2019 22:23)  HR: 72 (10 Apr 2019 05:23) (57 - 84)  BP: 124/65 (10 Apr 2019 05:23) (115/58 - 124/65)  RR: 15 (10 Apr 2019 05:23) (15 - 15)  SpO2: 100% (10 Apr 2019 05:23) (91% - 100%)  I&O's Summary    09 Apr 2019 07:01  -  10 Apr 2019 07:00  --------------------------------------------------------  IN: 1560 mL / OUT: 0 mL / NET: 1560 mL    10 Apr 2019 07:01  -  10 Apr 2019 10:15  --------------------------------------------------------  IN: 480 mL / OUT: 0 mL / NET: 480 mL      PHYSICAL EXAM:  General: NAD, A/O x 3  ENT: MMM  Neck: Supple, No JVD  Lungs: Slight left basilar crackles, overall lung exam is improved  Cardio: RRR, S1/S2  Abdomen: Soft, Nondistended; Bowel sounds present; nontender   Extremities: No calf tenderness, No pitting edema    LABS:                        11.2   15.6  )-----------( 201      ( 09 Apr 2019 05:00 )             32.9     04-09    136  |  97  |  44  ----------------------------<  116  3.9   |  31  |  1.18    Ca    8.9      09 Apr 2019 05:00      eGFR if Non African American: 46 mL/min/1.73M2 (04-09-19 @ 05:00)  eGFR if African American: 53 mL/min/1.73M2 (04-09-19 @ 05:00)    CAPILLARY BLOOD GLUCOSE      POCT Blood Glucose.: 120 mg/dL (09 Apr 2019 22:19)  POCT Blood Glucose.: 118 mg/dL (09 Apr 2019 17:01)  POCT Blood Glucose.: 135 mg/dL (09 Apr 2019 11:52)    04-05 [id number] 5.7
Patient is a 74y old  Female who presents with a chief complaint of s/p fall (05 Apr 2019 22:01)  Pt offers no acute complaints      Patient seen and examined at bedside.    ALLERGIES:  dust (Unknown)  latex (Unknown)  Originally Entered as [Unknown] reaction to [CATHAIR] (Unknown)  Originally Entered as [Unknown] reaction to [MILDEW] (Unknown)  Zosyn (Unknown)        Vital Signs Last 24 Hrs  T(F): 98.4 (06 Apr 2019 09:26), Max: 99 (05 Apr 2019 20:22)  HR: 84 (06 Apr 2019 09:26) (74 - 100)  BP: 154/46 (06 Apr 2019 09:26) (125/61 - 154/46)  RR: 15 (06 Apr 2019 09:26) (14 - 15)  SpO2: 94% (06 Apr 2019 09:26) (91% - 94%)  I&O's Summary    MEDICATIONS:  acetaminophen   Tablet .. 650 milliGRAM(s) Oral every 6 hours PRN  ALBUTerol    90 MICROgram(s) HFA Inhaler 1 Puff(s) Inhalation every 4 hours  ALBUTerol/ipratropium for Nebulization 3 milliLiter(s) Nebulizer every 4 hours PRN  atorvastatin 20 milliGRAM(s) Oral at bedtime  buDESOnide   0.25 milliGRAM(s) Respule 0.25 milliGRAM(s) Inhalation two times a day  dextrose 40% Gel 15 Gram(s) Oral once PRN  dextrose 5%. 1000 milliLiter(s) IV Continuous <Continuous>  dextrose 50% Injectable 12.5 Gram(s) IV Push once  dextrose 50% Injectable 25 Gram(s) IV Push once  dextrose 50% Injectable 25 Gram(s) IV Push once  enoxaparin Injectable 40 milliGRAM(s) SubCutaneous every 24 hours  folic acid 1 milliGRAM(s) Oral daily  glucagon  Injectable 1 milliGRAM(s) IntraMuscular once PRN  insulin lispro (HumaLOG) corrective regimen sliding scale   SubCutaneous three times a day before meals  insulin lispro (HumaLOG) corrective regimen sliding scale   SubCutaneous at bedtime  LORazepam   Injectable 1 milliGRAM(s) IV Push every 2 hours PRN  losartan 50 milliGRAM(s) Oral daily  magnesium oxide 400 milliGRAM(s) Oral daily  metoprolol succinate  milliGRAM(s) Oral daily  morphine  - Injectable 2 milliGRAM(s) IV Push every 8 hours PRN  multivitamin 1 Tablet(s) Oral daily  ondansetron Injectable 4 milliGRAM(s) IV Push every 8 hours PRN  oxyCODONE    IR 5 milliGRAM(s) Oral every 4 hours PRN  pantoprazole    Tablet 40 milliGRAM(s) Oral before breakfast  polyethylene glycol 3350 17 Gram(s) Oral daily PRN  senna 2 Tablet(s) Oral at bedtime  sodium chloride 0.9%. 1000 milliLiter(s) IV Continuous <Continuous>  sodium chloride 0.9%. 1000 milliLiter(s) IV Continuous <Continuous>  thiamine 100 milliGRAM(s) Oral daily  tiotropium 18 MICROgram(s) Capsule 1 Capsule(s) Inhalation daily      PHYSICAL EXAM:  General: NAD, A/O x 3  ENT: MMM  Neck: Supple, No JVD  Lungs: Clear to auscultation bilaterally  Cardio: S1S2 regular  Abdomen: Soft, Nontender, Nondistended; Bowel sounds present  Extremities: No cyanosis, No edema    LABS:                        12.5   9.2   )-----------( 232      ( 06 Apr 2019 05:45 )             37.8     04-06    131  |  96  |  16  ----------------------------<  160  5.0   |  26  |  0.96    Ca    8.3      06 Apr 2019 05:45  Phos  3.1     04-05  Mg     2.0     04-06    TPro  6.5  /  Alb  3.1  /  TBili  0.6  /  DBili  x   /  AST  26  /  ALT  18  /  AlkPhos  62  04-06    eGFR if Non African American: 58 mL/min/1.73M2 (04-06-19 @ 05:45)  eGFR if : 68 mL/min/1.73M2 (04-06-19 @ 05:45)        CARDIAC MARKERS ( 05 Apr 2019 06:16 )  x     / x     / 234 U/L / x     / x      CARDIAC MARKERS ( 05 Apr 2019 04:10 )  <.017 ng/mL / x     / x     / x     / x      CARDIAC MARKERS ( 05 Apr 2019 00:30 )  <.017 ng/mL / x     / x     / x     / x                  CAPILLARY BLOOD GLUCOSE      POCT Blood Glucose.: 161 mg/dL (06 Apr 2019 07:39)  POCT Blood Glucose.: 145 mg/dL (05 Apr 2019 20:45)  POCT Blood Glucose.: 115 mg/dL (05 Apr 2019 17:09)  POCT Blood Glucose.: 147 mg/dL (05 Apr 2019 11:29)    04-05 [id number] 5.7          RADIOLOGY & ADDITIONAL TESTS:    < from: CT Lumbar Spine No Cont (04.05.19 @ 01:01) >    IMPRESSION:    New fracture lucency through L2 extending through the anterior and middle   column to the posterior cortex with minimal bony retropulsion. Mild   vertical loss of height of the L2 vertebral body.    Chronic mild compression fractureof the L1 vertebral body.    Lumbar spondylosis, as described.    < end of copied text >        Care Discussed with Consultants/Other Providers:
Patient is a 74y old  Female who presents with a chief complaint of s/p fall (06 Apr 2019 09:56)      BRIEF HOSPITAL COURSE: 73 yo female, PMHx EtOH abuse, HTN, asthma, DMT2, CAD s/p CABG x 4, AAA repair, frequent falls, who presented from home s/p mechanical fall sustained L2 vertebral compression fracture.      Events last 24 hours: Called by RN for wheezing, hypoxia with SaO2 in 80's. Patient in NAD, given additional duoneb, oxygenation improved to 93% on 2lpm NC. Called again for hypoxia SaO2 87% with concerns for altered mental status. On evaluation, patient adamant that she is in a basement because there are four walls, a ceiling, and no windows. Oriented to self and situation, complains of low back pain and admits to high anxiety. Given dose of PRN ativan. Diffuse inspiratory and expiratory wheeze on physical exam without accessory muscle use, able to speak in full sentences. Patient refusing additional neb tx or steroids due to h/o nausea and diaphoresis.     PAST MEDICAL & SURGICAL HISTORY:  Alcohol abuse  HTN (hypertension)  Asthma  DM (diabetes mellitus)  H/O shoulder surgery  S/P CABG x 4      SOCIAL HISTORY: Current alcohol use, 3-4 glasses of wine daily. Lives at home with .      Review of Systems: patient noncompliant with full ROS at this time.   CONSTITUTIONAL: No fever or chills  RESPIRATORY: +minimal cough, not increased from baseline. +wheezing. states breathing feels "so so"  CARDIOVASCULAR: No chest pain  GASTROINTESTINAL: +hungry  SKIN: No itching  MUSCULOSKELETAL: +low back pain  PSYCHIATRIC: +anxiety        Medications:  losartan 50 milliGRAM(s) Oral daily  metoprolol succinate  milliGRAM(s) Oral daily  ALBUTerol    90 MICROgram(s) HFA Inhaler 1 Puff(s) Inhalation every 4 hours  ALBUTerol/ipratropium for Nebulization 3 milliLiter(s) Nebulizer every 6 hours  buDESOnide   0.25 milliGRAM(s) Respule 0.25 milliGRAM(s) Inhalation two times a day  tiotropium 18 MICROgram(s) Capsule 1 Capsule(s) Inhalation daily  acetaminophen   Tablet .. 650 milliGRAM(s) Oral every 6 hours PRN  chlordiazePOXIDE   Oral   LORazepam   Injectable 1 milliGRAM(s) IV Push every 2 hours PRN  morphine  - Injectable 2 milliGRAM(s) IV Push every 8 hours PRN  ondansetron Injectable 4 milliGRAM(s) IV Push every 8 hours PRN  oxyCODONE    IR 5 milliGRAM(s) Oral every 4 hours PRN  enoxaparin Injectable 40 milliGRAM(s) SubCutaneous every 24 hours  pantoprazole    Tablet 40 milliGRAM(s) Oral before breakfast  polyethylene glycol 3350 17 Gram(s) Oral daily PRN  senna 2 Tablet(s) Oral at bedtime      atorvastatin 20 milliGRAM(s) Oral at bedtime  dextrose 40% Gel 15 Gram(s) Oral once PRN  dextrose 50% Injectable 12.5 Gram(s) IV Push once  dextrose 50% Injectable 25 Gram(s) IV Push once  dextrose 50% Injectable 25 Gram(s) IV Push once  glucagon  Injectable 1 milliGRAM(s) IntraMuscular once PRN  insulin lispro (HumaLOG) corrective regimen sliding scale   SubCutaneous three times a day before meals  insulin lispro (HumaLOG) corrective regimen sliding scale   SubCutaneous at bedtime  methylPREDNISolone sodium succinate Injectable 40 milliGRAM(s) IV Push every 12 hours    dextrose 5%. 1000 milliLiter(s) IV Continuous <Continuous>  folic acid 1 milliGRAM(s) Oral daily  magnesium oxide 400 milliGRAM(s) Oral daily  multivitamin 1 Tablet(s) Oral daily  thiamine 100 milliGRAM(s) Oral daily      ICU Vital Signs Last 24 Hrs  T(C): 36.9 (06 Apr 2019 20:56), Max: 37.3 (06 Apr 2019 16:14)  T(F): 98.4 (06 Apr 2019 20:56), Max: 99.2 (06 Apr 2019 16:14)  HR: 94 (06 Apr 2019 22:44) (81 - 183)  BP: 149/66 (06 Apr 2019 20:56) (139/52 - 154/46)  BP(mean): --  ABP: --  ABP(mean): --  RR: 17 (06 Apr 2019 20:56) (15 - 17)  SpO2: 94% (06 Apr 2019 22:44) (91% - 98%)          I&O's Detail    06 Apr 2019 07:01  -  07 Apr 2019 00:29  --------------------------------------------------------  IN:    Oral Fluid: 480 mL  Total IN: 480 mL    OUT:  Total OUT: 0 mL    Total NET: 480 mL            LABS:                        12.5   9.2   )-----------( 232      ( 06 Apr 2019 05:45 )             37.8     04-06    131<L>  |  96  |  16  ----------------------------<  160<H>  5.0   |  26  |  0.96    Ca    8.3<L>      06 Apr 2019 05:45  Phos  3.1     04-05  Mg     2.0     04-06    TPro  6.5  /  Alb  3.1<L>  /  TBili  0.6  /  DBili  x   /  AST  26  /  ALT  18  /  AlkPhos  62  04-06      CARDIAC MARKERS ( 05 Apr 2019 06:16 )  x     / x     / 234 U/L / x     / x      CARDIAC MARKERS ( 05 Apr 2019 04:10 )  <.017 ng/mL / x     / x     / x     / x      CARDIAC MARKERS ( 05 Apr 2019 00:30 )  <.017 ng/mL / x     / x     / x     / x          CAPILLARY BLOOD GLUCOSE      POCT Blood Glucose.: 151 mg/dL (06 Apr 2019 21:07)        CULTURES:      Physical Examination:    General: No acute distress.      HEENT: Pupils equal, reactive to light.  Symmetric.    PULM: Diffuse inspiratory and expiratory wheezing bilaterally, no significant sputum production    CVS: Regular rate and rhythm, no murmurs, rubs, or gallops    ABD: Soft, nondistended, nontender, normoactive bowel sounds, no masses    EXT: No edema, nontender    SKIN: Warm and well perfused, no rashes noted.    NEURO: Alert, oriented to person and situation; reoriented to place, not oriented to date      RADIOLOGY: < from: Xray Chest 1 View- PORTABLE-Urgent (04.06.19 @ 10:43) >  EXAM:  XR CHEST PORTABLE URGENT 1V      PROCEDURE DATE:  04/06/2019        INTERPRETATION:  Single view chest    History hypoxia    Comparison yesterday    There has been median sternotomy. There is moderate cardiomegaly. There   is development of mild central edema without layering effusion or lobar   consolidation.    DARI LEROY M.D., ATTENDING RADIOLOGIST  This document has been electronically signed. Apr 6 2019 11:03AM      TIME SPENT: 25 minutes assessing presenting problems of acute illness, as well as medical decision making including initiating plan of care, reviewing data, reviewing radiologic exams, discussing with multidisciplinary team, non-inclusive of procedures performed, discussing goals of care with patient.
Patient is a 74y old  Female who presents with a chief complaint of s/p fall (07 Apr 2019 00:29). Was noted to be hypoxic overenight 87% on 2 L NC, which improved with nebs.  Pt also with h.o anxiety.  Pt feeling well this morning.  Reports breathing is comfortable      Patient seen and examined at bedside.    ALLERGIES:  dust (Unknown)  latex (Unknown)  Originally Entered as [Unknown] reaction to [CATHAIR] (Unknown)  Originally Entered as [Unknown] reaction to [MILDEW] (Unknown)  Zosyn (Unknown)    MEDICATIONS  (STANDING):  ALBUTerol    90 MICROgram(s) HFA Inhaler 1 Puff(s) Inhalation every 4 hours  ALBUTerol/ipratropium for Nebulization 3 milliLiter(s) Nebulizer every 6 hours  atorvastatin 20 milliGRAM(s) Oral at bedtime  buDESOnide   0.25 milliGRAM(s) Respule 0.25 milliGRAM(s) Inhalation two times a day  chlordiazePOXIDE 50 milliGRAM(s) Oral every 8 hours  chlordiazePOXIDE   Oral   dextrose 5%. 1000 milliLiter(s) (50 mL/Hr) IV Continuous <Continuous>  dextrose 50% Injectable 12.5 Gram(s) IV Push once  dextrose 50% Injectable 25 Gram(s) IV Push once  dextrose 50% Injectable 25 Gram(s) IV Push once  enoxaparin Injectable 40 milliGRAM(s) SubCutaneous every 24 hours  folic acid 1 milliGRAM(s) Oral daily  insulin lispro (HumaLOG) corrective regimen sliding scale   SubCutaneous three times a day before meals  insulin lispro (HumaLOG) corrective regimen sliding scale   SubCutaneous at bedtime  losartan 50 milliGRAM(s) Oral daily  magnesium oxide 400 milliGRAM(s) Oral daily  metoprolol succinate  milliGRAM(s) Oral daily  multivitamin 1 Tablet(s) Oral daily  pantoprazole    Tablet 40 milliGRAM(s) Oral before breakfast  predniSONE   Tablet 40 milliGRAM(s) Oral daily  senna 2 Tablet(s) Oral at bedtime  thiamine 100 milliGRAM(s) Oral daily  tiotropium 18 MICROgram(s) Capsule 1 Capsule(s) Inhalation daily    MEDICATIONS  (PRN):  acetaminophen   Tablet .. 650 milliGRAM(s) Oral every 6 hours PRN Temp greater or equal to 38C (100.4F), Mild Pain (1 - 3)  dextrose 40% Gel 15 Gram(s) Oral once PRN Blood Glucose LESS THAN 70 milliGRAM(s)/deciliter  glucagon  Injectable 1 milliGRAM(s) IntraMuscular once PRN Glucose LESS THAN 70 milligrams/deciliter  LORazepam   Injectable 1 milliGRAM(s) IV Push every 2 hours PRN CIWA-Ar score increase by 2 points and a total score of 7 or less  morphine  - Injectable 2 milliGRAM(s) IV Push every 8 hours PRN Severe Pain (7 - 10)  ondansetron Injectable 4 milliGRAM(s) IV Push every 8 hours PRN Nausea and/or Vomiting  oxyCODONE    IR 5 milliGRAM(s) Oral every 4 hours PRN Moderate Pain (4 - 6)  polyethylene glycol 3350 17 Gram(s) Oral daily PRN Constipation    Vital Signs Last 24 Hrs  T(F): 98.2 (07 Apr 2019 04:58), Max: 99.2 (06 Apr 2019 16:14)  HR: 109 (07 Apr 2019 04:58) (81 - 183)  BP: 136/73 (07 Apr 2019 04:58) (136/73 - 154/46)  RR: 16 (07 Apr 2019 04:58) (15 - 17)  SpO2: 96% (07 Apr 2019 04:58) (87% - 98%)  I&O's Summary    06 Apr 2019 07:01  -  07 Apr 2019 07:00  --------------------------------------------------------  IN: 480 mL / OUT: 0 mL / NET: 480 mL      BMI (kg/m2): 24 (04-05-19 @ 05:30)  PHYSICAL EXAM:  General: NAD, A/O x 3  ENT: MMM  Neck: Supple, No JVD  Lungs: mild expiratory wheezes  Cardio: RRR, S1/S2,  Abdomen: Soft, Nontender, Nondistended; Bowel sounds present  Extremities: No calf tenderness, No pitting edema  SpineL: tender to palpation along lumbar spine    LABS:                        12.6   11.4  )-----------( 204      ( 07 Apr 2019 05:45 )             36.9       04-07    132  |  96  |  22  ----------------------------<  258  4.4   |  25  |  1.26    Ca    8.7      07 Apr 2019 05:45  Phos  3.1     04-05  Mg     2.0     04-06    TPro  6.5  /  Alb  3.1  /  TBili  0.6  /  DBili  x   /  AST  26  /  ALT  18  /  AlkPhos  62  04-06     eGFR if : 49 mL/min/1.73M2 (04-07-19 @ 05:45)  eGFR if Non African American: 42 mL/min/1.73M2 (04-07-19 @ 05:45)         CARDIAC MARKERS ( 05 Apr 2019 06:16 )  x     / x     / 234 U/L / x     / x      CARDIAC MARKERS ( 05 Apr 2019 04:10 )  <.017 ng/mL / x     / x     / x     / x      CARDIAC MARKERS ( 05 Apr 2019 00:30 )  <.017 ng/mL / x     / x     / x     / x            TSH 1.79   TSH with FT4 reflex --  Total T3 --        CAPILLARY BLOOD GLUCOSE      POCT Blood Glucose.: 237 mg/dL (07 Apr 2019 07:25)  POCT Blood Glucose.: 151 mg/dL (06 Apr 2019 21:07)  POCT Blood Glucose.: 130 mg/dL (06 Apr 2019 16:54)  POCT Blood Glucose.: 214 mg/dL (06 Apr 2019 12:46)    04-05 [id number] 5.7          RADIOLOGY & ADDITIONAL TESTS:    Care Discussed with Consultants/Other Providers:
Patient is a 74y old  Female who presents with a chief complaint of s/p fall (07 Apr 2019 09:17)      Patient seen and examined at bedside.     ALLERGIES:  dust (Unknown)  latex (Unknown)  Originally Entered as [Unknown] reaction to [CATHAIR] (Unknown)  Originally Entered as [Unknown] reaction to [MILDEW] (Unknown)  Zosyn (Unknown)    MEDICATIONS  (STANDING):  ALBUTerol    90 MICROgram(s) HFA Inhaler 1 Puff(s) Inhalation every 4 hours  ALBUTerol/ipratropium for Nebulization 3 milliLiter(s) Nebulizer every 6 hours  atorvastatin 20 milliGRAM(s) Oral at bedtime  chlordiazePOXIDE 50 milliGRAM(s) Oral every 12 hours  chlordiazePOXIDE   Oral   dextrose 5%. 1000 milliLiter(s) (50 mL/Hr) IV Continuous <Continuous>  dextrose 50% Injectable 12.5 Gram(s) IV Push once  dextrose 50% Injectable 25 Gram(s) IV Push once  dextrose 50% Injectable 25 Gram(s) IV Push once  enoxaparin Injectable 40 milliGRAM(s) SubCutaneous every 24 hours  folic acid 1 milliGRAM(s) Oral daily  insulin lispro (HumaLOG) corrective regimen sliding scale   SubCutaneous three times a day before meals  insulin lispro (HumaLOG) corrective regimen sliding scale   SubCutaneous at bedtime  losartan 50 milliGRAM(s) Oral daily  magnesium oxide 400 milliGRAM(s) Oral daily  metoprolol succinate  milliGRAM(s) Oral daily  multivitamin 1 Tablet(s) Oral daily  pantoprazole    Tablet 40 milliGRAM(s) Oral before breakfast  predniSONE   Tablet 40 milliGRAM(s) Oral daily  senna 2 Tablet(s) Oral at bedtime  tiotropium 18 MICROgram(s) Capsule 1 Capsule(s) Inhalation daily    MEDICATIONS  (PRN):  acetaminophen   Tablet .. 650 milliGRAM(s) Oral every 6 hours PRN Temp greater or equal to 38C (100.4F), Mild Pain (1 - 3)  dextrose 40% Gel 15 Gram(s) Oral once PRN Blood Glucose LESS THAN 70 milliGRAM(s)/deciliter  glucagon  Injectable 1 milliGRAM(s) IntraMuscular once PRN Glucose LESS THAN 70 milligrams/deciliter  LORazepam   Injectable 1 milliGRAM(s) IV Push every 2 hours PRN CIWA-Ar score increase by 2 points and a total score of 7 or less  morphine  - Injectable 2 milliGRAM(s) IV Push every 8 hours PRN Severe Pain (7 - 10)  ondansetron Injectable 4 milliGRAM(s) IV Push every 8 hours PRN Nausea and/or Vomiting  oxyCODONE    IR 5 milliGRAM(s) Oral every 4 hours PRN Moderate Pain (4 - 6)  polyethylene glycol 3350 17 Gram(s) Oral daily PRN Constipation    Vital Signs Last 24 Hrs  T(F): 97.5 (08 Apr 2019 15:36), Max: 98.2 (08 Apr 2019 00:12)  HR: 55 (08 Apr 2019 15:36) (55 - 83)  BP: 133/58 (08 Apr 2019 15:36) (106/65 - 148/91)  RR: 16 (08 Apr 2019 15:36) (16 - 16)  SpO2: 93% (08 Apr 2019 15:36) (93% - 98%)  I&O's Summary    07 Apr 2019 07:01  -  08 Apr 2019 07:00  --------------------------------------------------------  IN: 410 mL / OUT: 0 mL / NET: 410 mL    08 Apr 2019 07:01  -  08 Apr 2019 15:37  --------------------------------------------------------  IN: 840 mL / OUT: 0 mL / NET: 840 mL      PHYSICAL EXAM:  General: NAD, A/O x 3  ENT: MMM  Neck: Supple, No JVD  Lungs: Bibasilar crackles, scattered wheezes   Cardio: RRR, S1/S2  Abdomen: Soft, Nontender, Mildly disteded; Bowel sounds present  Extremities: No calf tenderness, No pitting edema    LABS:                        11.2   15.8  )-----------( 201      ( 08 Apr 2019 05:55 )             34.2     04-08    133  |  96  |  35  ----------------------------<  150  4.3   |  28  |  1.09    Ca    8.6      08 Apr 2019 05:55  Mg     2.0     04-06    TPro  6.5  /  Alb  3.1  /  TBili  0.6  /  DBili  x   /  AST  26  /  ALT  18  /  AlkPhos  62  04-06    eGFR if Non African American: 50 mL/min/1.73M2 (04-08-19 @ 05:55)  eGFR if African American: 58 mL/min/1.73M2 (04-08-19 @ 05:55)    Serum Pro-Brain Natriuretic Peptide: 53112 pg/mL (04.08.19 @ 05:55)      CAPILLARY BLOOD GLUCOSE      POCT Blood Glucose.: 192 mg/dL (08 Apr 2019 11:40)  POCT Blood Glucose.: 142 mg/dL (08 Apr 2019 07:43)  POCT Blood Glucose.: 216 mg/dL (07 Apr 2019 21:10)  POCT Blood Glucose.: 144 mg/dL (07 Apr 2019 15:42)    04-05 [id number] 5.7      RADIOLOGY & ADDITIONAL TESTS:  < from: Xray Chest 1 View- PORTABLE-Routine (04.08.19 @ 10:53) >  There is moderate cardiomegaly. There is worsening interstitial edema.   There has median sternotomy. There is no lobar consolidation or layering   effusion. A Postoperative suture line involves the right upper lung.    < end of copied text >
Patient is a 74y old  Female who presents with a chief complaint of s/p fall (08 Apr 2019 15:37)      Patient seen and examined at bedside. Feels "better" today    ALLERGIES:  dust (Unknown)  latex (Unknown)  Originally Entered as [Unknown] reaction to [CATHAIR] (Unknown)  Originally Entered as [Unknown] reaction to [MILDEW] (Unknown)  Zosyn (Unknown)    MEDICATIONS  (STANDING):  ALBUTerol    90 MICROgram(s) HFA Inhaler 1 Puff(s) Inhalation every 4 hours  ALBUTerol/ipratropium for Nebulization 3 milliLiter(s) Nebulizer every 6 hours  atorvastatin 20 milliGRAM(s) Oral at bedtime  bisacodyl 5 milliGRAM(s) Oral at bedtime  chlordiazePOXIDE   Oral   chlordiazePOXIDE 25 milliGRAM(s) Oral every 12 hours  dextrose 5%. 1000 milliLiter(s) (50 mL/Hr) IV Continuous <Continuous>  dextrose 50% Injectable 12.5 Gram(s) IV Push once  dextrose 50% Injectable 25 Gram(s) IV Push once  dextrose 50% Injectable 25 Gram(s) IV Push once  docusate sodium 100 milliGRAM(s) Oral two times a day  enoxaparin Injectable 40 milliGRAM(s) SubCutaneous every 24 hours  folic acid 1 milliGRAM(s) Oral daily  insulin lispro (HumaLOG) corrective regimen sliding scale   SubCutaneous three times a day before meals  insulin lispro (HumaLOG) corrective regimen sliding scale   SubCutaneous at bedtime  losartan 50 milliGRAM(s) Oral daily  magnesium oxide 400 milliGRAM(s) Oral daily  metoprolol succinate  milliGRAM(s) Oral daily  multivitamin 1 Tablet(s) Oral daily  pantoprazole    Tablet 40 milliGRAM(s) Oral before breakfast  senna 2 Tablet(s) Oral at bedtime  tiotropium 18 MICROgram(s) Capsule 1 Capsule(s) Inhalation daily    MEDICATIONS  (PRN):  acetaminophen   Tablet .. 650 milliGRAM(s) Oral every 6 hours PRN Temp greater or equal to 38C (100.4F), Mild Pain (1 - 3)  dextrose 40% Gel 15 Gram(s) Oral once PRN Blood Glucose LESS THAN 70 milliGRAM(s)/deciliter  glucagon  Injectable 1 milliGRAM(s) IntraMuscular once PRN Glucose LESS THAN 70 milligrams/deciliter  LORazepam   Injectable 1 milliGRAM(s) IV Push every 2 hours PRN CIWA-Ar score increase by 2 points and a total score of 7 or less  morphine  - Injectable 2 milliGRAM(s) IV Push every 8 hours PRN Severe Pain (7 - 10)  ondansetron Injectable 4 milliGRAM(s) IV Push every 8 hours PRN Nausea and/or Vomiting  oxyCODONE    IR 5 milliGRAM(s) Oral every 4 hours PRN Moderate Pain (4 - 6)  polyethylene glycol 3350 17 Gram(s) Oral daily PRN Constipation    Vital Signs Last 24 Hrs  T(F): 97.2 (09 Apr 2019 05:22), Max: 97.5 (08 Apr 2019 15:36)  HR: 73 (09 Apr 2019 10:08) (45 - 73)  BP: 137/72 (09 Apr 2019 05:22) (127/64 - 137/72)  RR: 15 (09 Apr 2019 05:22) (15 - 16)  SpO2: 94% (09 Apr 2019 10:08) (92% - 95%)  I&O's Summary    08 Apr 2019 07:01  -  09 Apr 2019 07:00  --------------------------------------------------------  IN: 1560 mL / OUT: 0 mL / NET: 1560 mL    09 Apr 2019 07:01  -  09 Apr 2019 13:29  --------------------------------------------------------  IN: 480 mL / OUT: 0 mL / NET: 480 mL      PHYSICAL EXAM:  General: NAD, A/O x 2-3  ENT: MMM  Neck: Supple, No JVD  Lungs: Slight left basilar crackles, overall lung exam is improved  Cardio: RRR, S1/S2  Abdomen: Soft, Mildly distened, Nondistended; Bowel sounds present  Extremities: No calf tenderness, No pitting edema    LABS:                        11.2   15.6  )-----------( 201      ( 09 Apr 2019 05:00 )             32.9     04-09    136  |  97  |  44  ----------------------------<  116  3.9   |  31  |  1.18    Ca    8.9      09 Apr 2019 05:00      eGFR if Non African American: 46 mL/min/1.73M2 (04-09-19 @ 05:00)  eGFR if African American: 53 mL/min/1.73M2 (04-09-19 @ 05:00)      CAPILLARY BLOOD GLUCOSE      POCT Blood Glucose.: 135 mg/dL (09 Apr 2019 11:52)  POCT Blood Glucose.: 107 mg/dL (09 Apr 2019 07:51)  POCT Blood Glucose.: 213 mg/dL (08 Apr 2019 21:50)  POCT Blood Glucose.: 152 mg/dL (08 Apr 2019 16:58)    04-05 [id number] 5.7
Patient is a 74y old  Female who presents with a chief complaint of s/p fall (10 Apr 2019 13:17)      Patient seen and examined at bedside. Complains of back pain.    ALLERGIES:  dust (Unknown)  latex (Unknown)  Originally Entered as [Unknown] reaction to [CATHAIR] (Unknown)  Originally Entered as [Unknown] reaction to [MILDEW] (Unknown)  Zosyn (Unknown)    MEDICATIONS  (STANDING):  ALBUTerol    90 MICROgram(s) HFA Inhaler 1 Puff(s) Inhalation every 4 hours  ALBUTerol/ipratropium for Nebulization 3 milliLiter(s) Nebulizer every 6 hours  atorvastatin 20 milliGRAM(s) Oral at bedtime  bisacodyl 5 milliGRAM(s) Oral at bedtime  cholecalciferol 1000 Unit(s) Oral daily  dextrose 5%. 1000 milliLiter(s) (50 mL/Hr) IV Continuous <Continuous>  dextrose 50% Injectable 12.5 Gram(s) IV Push once  dextrose 50% Injectable 25 Gram(s) IV Push once  dextrose 50% Injectable 25 Gram(s) IV Push once  docusate sodium 100 milliGRAM(s) Oral three times a day  enoxaparin Injectable 40 milliGRAM(s) SubCutaneous every 24 hours  folic acid 1 milliGRAM(s) Oral daily  furosemide    Tablet 20 milliGRAM(s) Oral daily  insulin lispro (HumaLOG) corrective regimen sliding scale   SubCutaneous three times a day before meals  insulin lispro (HumaLOG) corrective regimen sliding scale   SubCutaneous at bedtime  magnesium oxide 400 milliGRAM(s) Oral daily  metoprolol succinate  milliGRAM(s) Oral daily  multivitamin 1 Tablet(s) Oral daily  pantoprazole    Tablet 40 milliGRAM(s) Oral before breakfast  polyethylene glycol 3350 17 Gram(s) Oral daily  senna 2 Tablet(s) Oral at bedtime  tiotropium 18 MICROgram(s) Capsule 1 Capsule(s) Inhalation daily    MEDICATIONS  (PRN):  acetaminophen   Tablet .. 650 milliGRAM(s) Oral every 6 hours PRN Temp greater or equal to 38C (100.4F), Mild Pain (1 - 3)  dextrose 40% Gel 15 Gram(s) Oral once PRN Blood Glucose LESS THAN 70 milliGRAM(s)/deciliter  glucagon  Injectable 1 milliGRAM(s) IntraMuscular once PRN Glucose LESS THAN 70 milligrams/deciliter  LORazepam   Injectable 1 milliGRAM(s) IV Push every 2 hours PRN CIWA-Ar score increase by 2 points and a total score of 7 or less  morphine  - Injectable 2 milliGRAM(s) IV Push every 8 hours PRN Severe Pain (7 - 10)  ondansetron Injectable 4 milliGRAM(s) IV Push every 8 hours PRN Nausea and/or Vomiting  oxyCODONE    IR 5 milliGRAM(s) Oral every 4 hours PRN Moderate Pain (4 - 6)    Vital Signs Last 24 Hrs  T(F): 97.5 (11 Apr 2019 05:01), Max: 98.7 (10 Apr 2019 17:52)  HR: 81 (11 Apr 2019 10:14) (72 - 84)  BP: 100/63 (11 Apr 2019 10:14) (95/51 - 122/80)  RR: 15 (11 Apr 2019 05:01) (15 - 15)  SpO2: 97% (11 Apr 2019 10:14) (85% - 98%)  I&O's Summary    10 Apr 2019 07:01  -  11 Apr 2019 07:00  --------------------------------------------------------  IN: 1320 mL / OUT: 0 mL / NET: 1320 mL    11 Apr 2019 07:01  -  11 Apr 2019 10:23  --------------------------------------------------------  IN: 480 mL / OUT: 2 mL / NET: 478 mL    O2 SAT 85% AT REST - TAKEN BY ME    PHYSICAL EXAM:  General: NAD, A/O x 2-3  ENT: MMM  Neck: Supple, NO JVD  Lungs: Faint left basilar crackles  Cardio: RRR, S1/S2  Abdomen: Soft, Nontender, Nondistended; Bowel sounds present  Extremities: No calf tenderness, No pitting edema    LABS:                        13.2   8.3   )-----------( 234      ( 10 Apr 2019 11:25 )             40.2     04-11    140  |  100  |  28  ----------------------------<  106  4.1   |  34  |  0.97    Ca    8.7      11 Apr 2019 05:45      eGFR if Non African American: 58 mL/min/1.73M2 (04-11-19 @ 05:45)  eGFR if : 67 mL/min/1.73M2 (04-11-19 @ 05:45)    CAPILLARY BLOOD GLUCOSE      POCT Blood Glucose.: 98 mg/dL (11 Apr 2019 07:20)  POCT Blood Glucose.: 105 mg/dL (10 Apr 2019 21:49)  POCT Blood Glucose.: 176 mg/dL (10 Apr 2019 17:25)  POCT Blood Glucose.: 123 mg/dL (10 Apr 2019 12:06)    04-05 [id number] 5.7        RADIOLOGY & ADDITIONAL TESTS:  < from: Xray Chest 1 View AP/PA (04.10.19 @ 15:05) >  Developing left pleural effusion. Cannot rule out underlying infiltrate   or atelectasis. Right upper lobe scarring unchanged.    Prominent right hilum reidentified secondary to dilated pulmonary artery.    < end of copied text >      Care Discussed with Consultants/Other Providers: Dr. Sagastume
Patient is a 74y old  Female who presents with a chief complaint of s/p fall (11 Apr 2019 15:37)      Patient seen and examined at bedside.    ALLERGIES:  dust (Unknown)  latex (Unknown)  Originally Entered as [Unknown] reaction to [CATHAIR] (Unknown)  Originally Entered as [Unknown] reaction to [MILDEW] (Unknown)  Zosyn (Unknown)    MEDICATIONS  (STANDING):  ALBUTerol    90 MICROgram(s) HFA Inhaler 1 Puff(s) Inhalation every 4 hours  atorvastatin 20 milliGRAM(s) Oral at bedtime  bisacodyl 5 milliGRAM(s) Oral at bedtime  buDESOnide 160 MICROgram(s)/formoterol 4.5 MICROgram(s) Inhaler 2 Puff(s) Inhalation two times a day  cholecalciferol 1000 Unit(s) Oral daily  dextrose 5%. 1000 milliLiter(s) (50 mL/Hr) IV Continuous <Continuous>  dextrose 50% Injectable 12.5 Gram(s) IV Push once  dextrose 50% Injectable 25 Gram(s) IV Push once  dextrose 50% Injectable 25 Gram(s) IV Push once  docusate sodium 100 milliGRAM(s) Oral three times a day  enoxaparin Injectable 40 milliGRAM(s) SubCutaneous every 24 hours  folic acid 1 milliGRAM(s) Oral daily  furosemide    Tablet 20 milliGRAM(s) Oral daily  insulin lispro (HumaLOG) corrective regimen sliding scale   SubCutaneous three times a day before meals  insulin lispro (HumaLOG) corrective regimen sliding scale   SubCutaneous at bedtime  lidocaine   Patch 1 Patch Transdermal daily  magnesium oxide 400 milliGRAM(s) Oral daily  magnesium sulfate  IVPB 2 Gram(s) IV Intermittent once  metoprolol succinate  milliGRAM(s) Oral daily  multivitamin 1 Tablet(s) Oral daily  pantoprazole    Tablet 40 milliGRAM(s) Oral before breakfast  polyethylene glycol 3350 17 Gram(s) Oral daily  senna 2 Tablet(s) Oral at bedtime  tiotropium 18 MICROgram(s) Capsule 1 Capsule(s) Inhalation daily  tiotropium 18 MICROgram(s) Capsule 1 Capsule(s) Inhalation daily    MEDICATIONS  (PRN):  acetaminophen   Tablet .. 650 milliGRAM(s) Oral every 6 hours PRN Temp greater or equal to 38C (100.4F), Mild Pain (1 - 3)  ALBUTerol/ipratropium for Nebulization 3 milliLiter(s) Nebulizer every 6 hours PRN Shortness of Breath and/or Wheezing  dextrose 40% Gel 15 Gram(s) Oral once PRN Blood Glucose LESS THAN 70 milliGRAM(s)/deciliter  glucagon  Injectable 1 milliGRAM(s) IntraMuscular once PRN Glucose LESS THAN 70 milligrams/deciliter  ondansetron Injectable 4 milliGRAM(s) IV Push every 8 hours PRN Nausea and/or Vomiting  oxyCODONE    IR 5 milliGRAM(s) Oral every 4 hours PRN Moderate Pain (4 - 6)    Vital Signs Last 24 Hrs  T(F): 98.1 (12 Apr 2019 06:09), Max: 98.1 (12 Apr 2019 06:09)  HR: 80 (12 Apr 2019 06:09) (72 - 82)  BP: 96/63 (12 Apr 2019 06:09) (96/63 - 101/50)  RR: 14 (12 Apr 2019 08:30) (14 - 15)  SpO2: 95% (12 Apr 2019 08:30) (85% - 100%)  I&O's Summary    11 Apr 2019 07:01  -  12 Apr 2019 07:00  --------------------------------------------------------  IN: 1080 mL / OUT: 2 mL / NET: 1078 mL      PHYSICAL EXAM:  General: NAD, A/O x 3  ENT: MMM  Neck: Supple, NO JVD  Lungs: Faint left basilar crackles  Cardio: RRR, S1/S2  Abdomen: Soft, Nontender, Nondistended; Bowel sounds present  Extremities: No calf tenderness, No pitting edema    LABS:                        12.3   9.1   )-----------( 219      ( 12 Apr 2019 05:20 )             38.4     04-12    141  |  102  |  34  ----------------------------<  144  4.0   |  32  |  0.88    Ca    8.5      12 Apr 2019 05:20  Phos  4.8     04-12  Mg     1.2     04-12      eGFR if Non African American: 65 mL/min/1.73M2 (04-12-19 @ 05:20)  eGFR if African American: 75 mL/min/1.73M2 (04-12-19 @ 05:20)  CAPILLARY BLOOD GLUCOSE      POCT Blood Glucose.: 129 mg/dL (12 Apr 2019 08:02)  POCT Blood Glucose.: 128 mg/dL (11 Apr 2019 21:05)  POCT Blood Glucose.: 128 mg/dL (11 Apr 2019 16:50)  POCT Blood Glucose.: 132 mg/dL (11 Apr 2019 11:39)    04-05 [id number] 5.7        RADIOLOGY & ADDITIONAL TESTS:  < from: US Duplex Venous Lower Ext Complete, Bilateral (04.11.19 @ 19:05) >  No evidence of bilateral lower extremity deep venous thrombosis.    < end of copied text >      Care Discussed with Consultants/Other Providers: Dr. Sagastume

## 2019-04-12 NOTE — PROGRESS NOTE ADULT - ASSESSMENT
Physical Examination:  GENERAL:               Alert, Oriented, No acute distress.    HEENT:                   No JVD, Dry MM  PULM:                     Bilateral air entry, Diminished b/s, no significant sputum production, trace Rales, No Rhonchi, trace Wheezing  CVS:                         S1, S2,  + Murmur  ABD:                        Soft, nondistended, nontender, normoactive bowel sounds,   EXT:                         No edema, nontender, No Cyanosis   Vascular:                Warm Extremities,   NEURO:                  Alert, oriented, interactive, nonfocal, follows commands  PSYC:                      Calm, + Insight.    Assessment  Mild Hypoxia suspect due to continued vascular congestion with Acute on chronic diastolic CHF    with underlying chronic COPD and atelectasis can be playing a role   Fall with L2 vertebral fracture  h/o Lung cancer  ETOH abuse  Ex nicotine use  CAD s/p CABG  DM2    Plan  Continue incentive spirometry, OOB, PT  Spirea Symbicort,   Duoneb PRN  nasal canula o2 to maintain sat > 88%  Keep negative balance with Lasix  D/W Dr. Miguel  PFT when at stable state.

## 2019-04-12 NOTE — DISCHARGE NOTE PROVIDER - NSDCFUADDINST_GEN_ALL_CORE_FT
All activity as tolerated with assistance    Wear your LSO brace as needed for comfort    Follow-up with Dr. Leigh (ortho) in 2 weeks    Follow-up with your pulmonologist in 2 weeks for your COPD

## 2019-04-12 NOTE — DISCHARGE NOTE PROVIDER - NSDCCPCAREPLAN_GEN_ALL_CORE_FT
PRINCIPAL DISCHARGE DIAGNOSIS  Diagnosis: Vertebral compression fracture  Assessment and Plan of Treatment: Pain control  Rehab  wear back brace as needed for comfort

## 2019-04-12 NOTE — DISCHARGE NOTE NURSING/CASE MANAGEMENT/SOCIAL WORK - NSDCFUADDAPPT_GEN_ALL_CORE_FT
Pt will be discharged to [facility] at [facility].    will follow the pt at the facility. Pt will be discharged to [facility] [place] at [facility].  Dr Reyes will follow the pt at the facility.

## 2019-04-12 NOTE — DISCHARGE NOTE PROVIDER - HOSPITAL COURSE
74F hx of ETOH use, CAD/CABGx4, AAA repair, HTN, HLD, T2DM pw s/p mechanical fall with acute L2 vertebral fx. Treated non-surgically, LSO brace orderd    for patient comfort. Course complicated by CHF exacerbation, improved with lasix, as well as alcohol withdrawal, completed Ativan taper.     *Please refer to progress note from same date for an extended detailed summary of all of the patient's medical diagnoses during the course of this hospital stay.     D/C to [facility]

## 2019-04-12 NOTE — DISCHARGE NOTE NURSING/CASE MANAGEMENT/SOCIAL WORK - NSDCDPATPORTLINK_GEN_ALL_CORE
You can access the [facility] Patient Portal, offered by [facility], by registering with the following website: [email or web address]

## 2019-04-12 NOTE — PROGRESS NOTE ADULT - ASSESSMENT
74F hx of ETOH use, CAD/CABGx4, AAA repair, HTN, HLD, T2DM pw s/p mechanical fall with acute L2 vertebral fx. Course complicated by CHF exacerbation and alcohol withdrawal (See below for details)    #Acute L2 vertebral fracture s/p mechanical fall  -PT - recommend rehab  -OT consulted  -PMR eval ordered - [facility] vs [facility] - decision pending clinical course  -LSO brace recommended - can be used for comfort as needed, per Dr. Leigh  -Pain control - oxycodone prn and add lidocaine patch    #Acute pulmonary edema, suspect diastolic CHF exacerbation  #Left pleural effusion, persistent  #Acute (suspect on chronic) hypoxic respiratory failure  -EF is 51%  -Overall improved, now on PO lasix - resolved clinically (in fact, may have some degree of contraction alkalosis at this tiem)  -No more IVF at this time, would continue low dose lasix due to pleural effusion  -Dr. Sagastume consulted - likely a chronic component to the hypoxia  -incentive spirometer  -Outpatient PFTs  -Negative PCT, afebrile - no indication for current infectious process     #Hyperglycemia, steroid induced - resolved  #Leukocytosis, steroid induced - resolved  #History of diabetes, controlled, A1C 5.7, on metformin at home  -ISS  -now off steroids, this is not a severe asthma or COPD exacerbation    #Alcohol withdrawal with DTs  -Improved, on Librium, with standing taper - completed    #Essential HTN: stable on current regimen    #GABY: Cr ~0.6 --- > 1.2 --> 0.88-- hold ARB at this time (regardless, patient not hypertensive)     #constipation: continue laxatives especially while patient on narcotics    DVT ppx: Lovenox    #Advanced Care planning: DNR/DNI    Stable for d/c to [facility] today. Will likely need oxygen long term. Time spent on d/c 32 min. Case d/w , Ramírez, who is in agreement with the plan. 74F hx of ETOH use, CAD/CABGx4, AAA repair, HTN, HLD, T2DM pw s/p mechanical fall with acute L2 vertebral fx. Course complicated by CHF exacerbation and alcohol withdrawal (See below for details)    #Acute L2 vertebral fracture s/p mechanical fall  -PT - recommend rehab  -OT consulted  -PMR eval ordered - [facility] vs [facility] - decision pending clinical course  -LSO brace recommended - can be used for comfort as needed, per Dr. Leigh  -Pain control - oxycodone prn and add lidocaine patch    #Acute pulmonary edema, suspect diastolic CHF exacerbation  #Left pleural effusion, persistent  #Chronic hypoxic respiratory failure secondary to known history of COPD (ie: #Advanced COPD)  -EF is 51%  -Overall improved, now on PO lasix - resolved clinically (in fact, may have some degree of contraction alkalosis at this [facility])  -No more IVF at this time, would continue low dose lasix due to pleural effusion  -Dr. Sagastume following  -incentive spirometer  -Spoke to patient's  - she DOES have a history of COPD and follows with Dr. Patel (pulm) from [facility] as outpatient. I explained that she likely will need home O2 as it appears the patient is currently in her chronic and stable state. TBD at rehab.  -Negative PCT, afebrile - no indication for current infectious process     #Hyperglycemia, steroid induced - resolved  #Leukocytosis, steroid induced - resolved  #History of diabetes, controlled, A1C 5.7, on metformin at home  -ISS  -now off steroids, this is not a severe asthma or COPD exacerbation    #Alcohol withdrawal with DTs  -Improved, on Librium, with standing taper - completed    #Essential HTN: stable on current regimen    #GABY: Cr ~0.6 --- > 1.2 --> 0.88-- hold ARB at this time (regardless, patient not hypertensive)     #constipation: continue laxatives especially while patient on narcotics    DVT ppx: Lovenox    #Advanced Care planning: DNR/DNI    Stable for d/c to [facility] today. Will likely need oxygen long term. Time spent on d/c 32 min. Case d/w , Ramírez, who is in agreement with the plan. Case d/w Dr. Reyes - who will take over the case at [facility].

## 2019-04-12 NOTE — DISCHARGE NOTE PROVIDER - CARE PROVIDER_API CALL
Rajiv Betts  Phone: (   )    -  Fax: (   )    -  Follow Up Time: Rajiv Betts  Phone: (   )    -  Fax: (   )    -  Follow Up Time:     Gregory Leigh)  Orthopaedic Sports Medicine; Orthopaedic Surgery  [address]  [place] [address]  Phone: [phone]  Fax: [phone]  Follow Up Time:

## 2019-04-12 NOTE — DISCHARGE NOTE PROVIDER - NSDCCONDITION_GEN_ALL_CORE
Telephone Encounter by Reyes, Cora, RN at 02/15/18 12:53 PM     Author:  Reyes, Cora, RN Service:  (none) Author Type:  Registered Nurse     Filed:  02/15/18 12:58 PM Encounter Date:  2/9/2018 Status:  Signed     :  Reyes, Cora, RN (Registered Nurse)            Left message for patient to return call.    There are a few things being worked out in regards to the UltraMist therapy discussed at last visit.    1.) Recommended treatment would be 1-2 times per week for 4 weeks.  Tuesday at [facility] and Thursday at [place].  We are checking to see if co-pays would be applied to these visits or if there would be no co-pays for this therapy.    2.) We are in the process of getting the equipment set up to be transferred from site to site.    I do not know when we will be able to officially start until the above information is figured out.[CR1.1M]          Revision History        User Key Date/Time User Provider Type Action    > CR1.1 02/15/18 12:58 PM Reyes, Cora, RN Registered Nurse Sign    M - Manual             Stable

## 2019-04-26 ENCOUNTER — APPOINTMENT (OUTPATIENT)
Dept: ORTHOPEDIC SURGERY | Facility: CLINIC | Age: 74
End: 2019-04-26

## 2019-08-12 ENCOUNTER — EMERGENCY (EMERGENCY)
Facility: HOSPITAL | Age: 74
LOS: 1 days | Discharge: HOME | End: 2019-08-12
Attending: EMERGENCY MEDICINE | Admitting: EMERGENCY MEDICINE
Payer: MEDICARE

## 2019-08-12 VITALS
WEIGHT: 145.06 LBS | RESPIRATION RATE: 18 BRPM | HEIGHT: 64 IN | HEART RATE: 82 BPM | SYSTOLIC BLOOD PRESSURE: 186 MMHG | DIASTOLIC BLOOD PRESSURE: 103 MMHG | TEMPERATURE: 97 F | OXYGEN SATURATION: 96 %

## 2019-08-12 VITALS
OXYGEN SATURATION: 98 % | DIASTOLIC BLOOD PRESSURE: 95 MMHG | RESPIRATION RATE: 16 BRPM | SYSTOLIC BLOOD PRESSURE: 159 MMHG | HEART RATE: 80 BPM

## 2019-08-12 DIAGNOSIS — S49.90XA UNSPECIFIED INJURY OF SHOULDER AND UPPER ARM, UNSPECIFIED ARM, INITIAL ENCOUNTER: ICD-10-CM

## 2019-08-12 DIAGNOSIS — Z95.1 PRESENCE OF AORTOCORONARY BYPASS GRAFT: Chronic | ICD-10-CM

## 2019-08-12 DIAGNOSIS — Z98.890 OTHER SPECIFIED POSTPROCEDURAL STATES: Chronic | ICD-10-CM

## 2019-08-12 LAB
ALBUMIN SERPL ELPH-MCNC: 3.3 G/DL — SIGNIFICANT CHANGE UP (ref 3.3–5)
ALP SERPL-CCNC: 68 U/L — SIGNIFICANT CHANGE UP (ref 40–120)
ALT FLD-CCNC: 18 U/L DA — SIGNIFICANT CHANGE UP (ref 10–45)
ANION GAP SERPL CALC-SCNC: 11 MMOL/L — SIGNIFICANT CHANGE UP (ref 5–17)
APTT BLD: 31.2 SEC — SIGNIFICANT CHANGE UP (ref 27.5–36.3)
AST SERPL-CCNC: 24 U/L — SIGNIFICANT CHANGE UP (ref 10–40)
BASOPHILS # BLD AUTO: 0.1 K/UL — SIGNIFICANT CHANGE UP (ref 0–0.2)
BASOPHILS NFR BLD AUTO: 1.1 % — SIGNIFICANT CHANGE UP (ref 0–2)
BILIRUB SERPL-MCNC: 0.2 MG/DL — SIGNIFICANT CHANGE UP (ref 0.2–1.2)
BUN SERPL-MCNC: 8 MG/DL — SIGNIFICANT CHANGE UP (ref 7–23)
CALCIUM SERPL-MCNC: 8.3 MG/DL — LOW (ref 8.4–10.5)
CHLORIDE SERPL-SCNC: 92 MMOL/L — LOW (ref 96–108)
CO2 SERPL-SCNC: 27 MMOL/L — SIGNIFICANT CHANGE UP (ref 22–31)
CREAT SERPL-MCNC: 0.67 MG/DL — SIGNIFICANT CHANGE UP (ref 0.5–1.3)
EOSINOPHIL # BLD AUTO: 0.09 K/UL — SIGNIFICANT CHANGE UP (ref 0–0.5)
EOSINOPHIL NFR BLD AUTO: 1 % — SIGNIFICANT CHANGE UP (ref 0–6)
ETHANOL SERPL-MCNC: 241 MG/DL — HIGH (ref 0–3)
GLUCOSE SERPL-MCNC: 168 MG/DL — HIGH (ref 70–99)
HCT VFR BLD CALC: 42.6 % — SIGNIFICANT CHANGE UP (ref 34.5–45)
HGB BLD-MCNC: 14.6 G/DL — SIGNIFICANT CHANGE UP (ref 11.5–15.5)
IMM GRANULOCYTES NFR BLD AUTO: 0.5 % — SIGNIFICANT CHANGE UP (ref 0–1.5)
INR BLD: 0.99 RATIO — SIGNIFICANT CHANGE UP (ref 0.88–1.16)
LYMPHOCYTES # BLD AUTO: 1.65 K/UL — SIGNIFICANT CHANGE UP (ref 1–3.3)
LYMPHOCYTES # BLD AUTO: 18 % — SIGNIFICANT CHANGE UP (ref 13–44)
MAGNESIUM SERPL-MCNC: 1.3 MG/DL — LOW (ref 1.6–2.6)
MCHC RBC-ENTMCNC: 33 PG — SIGNIFICANT CHANGE UP (ref 27–34)
MCHC RBC-ENTMCNC: 34.3 GM/DL — SIGNIFICANT CHANGE UP (ref 32–36)
MCV RBC AUTO: 96.4 FL — SIGNIFICANT CHANGE UP (ref 80–100)
MONOCYTES # BLD AUTO: 0.65 K/UL — SIGNIFICANT CHANGE UP (ref 0–0.9)
MONOCYTES NFR BLD AUTO: 7.1 % — SIGNIFICANT CHANGE UP (ref 2–14)
NEUTROPHILS # BLD AUTO: 6.65 K/UL — SIGNIFICANT CHANGE UP (ref 1.8–7.4)
NEUTROPHILS NFR BLD AUTO: 72.3 % — SIGNIFICANT CHANGE UP (ref 43–77)
NRBC # BLD: 0 /100 WBCS — SIGNIFICANT CHANGE UP (ref 0–0)
PLATELET # BLD AUTO: 327 K/UL — SIGNIFICANT CHANGE UP (ref 150–400)
POTASSIUM SERPL-MCNC: 4.1 MMOL/L — SIGNIFICANT CHANGE UP (ref 3.5–5.3)
POTASSIUM SERPL-SCNC: 4.1 MMOL/L — SIGNIFICANT CHANGE UP (ref 3.5–5.3)
PROT SERPL-MCNC: 7 G/DL — SIGNIFICANT CHANGE UP (ref 6–8.3)
PROTHROM AB SERPL-ACNC: 11.1 SEC — SIGNIFICANT CHANGE UP (ref 10–12.9)
RBC # BLD: 4.42 M/UL — SIGNIFICANT CHANGE UP (ref 3.8–5.2)
RBC # FLD: 11.9 % — SIGNIFICANT CHANGE UP (ref 10.3–14.5)
SODIUM SERPL-SCNC: 130 MMOL/L — LOW (ref 135–145)
WBC # BLD: 9.19 K/UL — SIGNIFICANT CHANGE UP (ref 3.8–10.5)
WBC # FLD AUTO: 9.19 K/UL — SIGNIFICANT CHANGE UP (ref 3.8–10.5)

## 2019-08-12 PROCEDURE — 73030 X-RAY EXAM OF SHOULDER: CPT | Mod: 26,LT,76

## 2019-08-12 PROCEDURE — 96365 THER/PROPH/DIAG IV INF INIT: CPT | Mod: XU

## 2019-08-12 PROCEDURE — 96375 TX/PRO/DX INJ NEW DRUG ADDON: CPT | Mod: XU

## 2019-08-12 PROCEDURE — 73080 X-RAY EXAM OF ELBOW: CPT

## 2019-08-12 PROCEDURE — 99284 EMERGENCY DEPT VISIT MOD MDM: CPT | Mod: 57

## 2019-08-12 PROCEDURE — 70450 CT HEAD/BRAIN W/O DYE: CPT

## 2019-08-12 PROCEDURE — 70450 CT HEAD/BRAIN W/O DYE: CPT | Mod: 26

## 2019-08-12 PROCEDURE — 23650 CLTX SHO DSLC W/MNPJ WO ANES: CPT | Mod: XU

## 2019-08-12 PROCEDURE — 85027 COMPLETE CBC AUTOMATED: CPT

## 2019-08-12 PROCEDURE — 96361 HYDRATE IV INFUSION ADD-ON: CPT | Mod: XU

## 2019-08-12 PROCEDURE — 23650 CLTX SHO DSLC W/MNPJ WO ANES: CPT | Mod: 54

## 2019-08-12 PROCEDURE — 80307 DRUG TEST PRSMV CHEM ANLYZR: CPT

## 2019-08-12 PROCEDURE — 85730 THROMBOPLASTIN TIME PARTIAL: CPT

## 2019-08-12 PROCEDURE — 73080 X-RAY EXAM OF ELBOW: CPT | Mod: 26,LT

## 2019-08-12 PROCEDURE — 83735 ASSAY OF MAGNESIUM: CPT

## 2019-08-12 PROCEDURE — 85610 PROTHROMBIN TIME: CPT

## 2019-08-12 PROCEDURE — 73030 X-RAY EXAM OF SHOULDER: CPT

## 2019-08-12 PROCEDURE — 99285 EMERGENCY DEPT VISIT HI MDM: CPT | Mod: 25

## 2019-08-12 PROCEDURE — 80053 COMPREHEN METABOLIC PANEL: CPT

## 2019-08-12 RX ORDER — CIPROFLOXACIN LACTATE 400MG/40ML
500 VIAL (ML) INTRAVENOUS ONCE
Refills: 0 | Status: DISCONTINUED | OUTPATIENT
Start: 2019-08-12 | End: 2019-08-12

## 2019-08-12 RX ORDER — METFORMIN HYDROCHLORIDE 850 MG/1
1 TABLET ORAL
Qty: 0 | Refills: 0 | DISCHARGE

## 2019-08-12 RX ORDER — ONDANSETRON 8 MG/1
4 TABLET, FILM COATED ORAL ONCE
Refills: 0 | Status: COMPLETED | OUTPATIENT
Start: 2019-08-12 | End: 2019-08-12

## 2019-08-12 RX ORDER — METRONIDAZOLE 500 MG
500 TABLET ORAL ONCE
Refills: 0 | Status: DISCONTINUED | OUTPATIENT
Start: 2019-08-12 | End: 2019-08-12

## 2019-08-12 RX ORDER — METOCLOPRAMIDE HCL 10 MG
10 TABLET ORAL ONCE
Refills: 0 | Status: COMPLETED | OUTPATIENT
Start: 2019-08-12 | End: 2019-08-12

## 2019-08-12 RX ORDER — MAGNESIUM SULFATE 500 MG/ML
1 VIAL (ML) INJECTION ONCE
Refills: 0 | Status: COMPLETED | OUTPATIENT
Start: 2019-08-12 | End: 2019-08-12

## 2019-08-12 RX ORDER — SIMVASTATIN 20 MG/1
40 TABLET, FILM COATED ORAL
Qty: 0 | Refills: 0 | DISCHARGE

## 2019-08-12 RX ORDER — MORPHINE SULFATE 50 MG/1
4 CAPSULE, EXTENDED RELEASE ORAL ONCE
Refills: 0 | Status: DISCONTINUED | OUTPATIENT
Start: 2019-08-12 | End: 2019-08-12

## 2019-08-12 RX ORDER — SODIUM CHLORIDE 9 MG/ML
1000 INJECTION INTRAMUSCULAR; INTRAVENOUS; SUBCUTANEOUS ONCE
Refills: 0 | Status: COMPLETED | OUTPATIENT
Start: 2019-08-12 | End: 2019-08-12

## 2019-08-12 RX ADMIN — Medication 10 MILLIGRAM(S): at 15:02

## 2019-08-12 RX ADMIN — MORPHINE SULFATE 4 MILLIGRAM(S): 50 CAPSULE, EXTENDED RELEASE ORAL at 07:29

## 2019-08-12 RX ADMIN — ONDANSETRON 4 MILLIGRAM(S): 8 TABLET, FILM COATED ORAL at 13:12

## 2019-08-12 RX ADMIN — SODIUM CHLORIDE 1000 MILLILITER(S): 9 INJECTION INTRAMUSCULAR; INTRAVENOUS; SUBCUTANEOUS at 07:29

## 2019-08-12 RX ADMIN — MORPHINE SULFATE 4 MILLIGRAM(S): 50 CAPSULE, EXTENDED RELEASE ORAL at 07:59

## 2019-08-12 RX ADMIN — Medication 1 GRAM(S): at 10:21

## 2019-08-12 RX ADMIN — Medication 100 GRAM(S): at 09:11

## 2019-08-12 RX ADMIN — SODIUM CHLORIDE 1000 MILLILITER(S): 9 INJECTION INTRAMUSCULAR; INTRAVENOUS; SUBCUTANEOUS at 08:43

## 2019-08-12 NOTE — ED ADULT NURSE NOTE - OBJECTIVE STATEMENT
Pt arrives to ER brought in by EMS stating that she fell at home on her left shoulder and now has pain 8/10. Pt presents with left shoulder in a sling placed by EMS, positive deformity noted, denies numbness/tingling to injured extremity, moving fingers purposefully. Pt is unable to provide information on how she fell, denies feeling dizzy or passing out, pt has hx of alcohol abuse, smells of alcohol on her breath, states her last drink was "not too long ago" Pt has old bruising to left forearm, right elbow and old abrasions to knees. Pt denies head pain, denies chest pain, denies sob, or fever/chills.

## 2019-08-12 NOTE — ED ADULT TRIAGE NOTE - CHIEF COMPLAINT QUOTE
"I tripped and fell at home and my left shoulder hurts" pt arrives to ER by ambulance with left shoulder in sling

## 2019-08-12 NOTE — ED PROVIDER NOTE - PROGRESS NOTE DETAILS
Pt states she did not have a seizure. However, she cannot discuss the details of her fall. She says she tripped (and hit her shoulder). That is all.

## 2019-08-12 NOTE — ED ADULT NURSE REASSESSMENT NOTE - NS ED NURSE REASSESS COMMENT FT1
Pt being evaluated by social work to determine disposition, was offered breakfast tray and refused. Pt reports improvement in left shoulder pain after reduction, denies any other pain or discomfort at this time. will continue to monitor.

## 2019-08-12 NOTE — ED PROVIDER NOTE - FAMILY DETAILS FREE TEXT FOR MDM ADDL HISTORY OBTAINED FROM QUESTION
d/w  he cannot pick her up. We will wait for clinical sobriety and safely discharge. Voucher for cab. Pt clinically sober and stable for dc.

## 2019-08-12 NOTE — CHART NOTE - NSCHARTNOTEFT_GEN_A_CORE
Met with patient to discuss her ED visit. Patient is a 74 y.o.  female who resides in [place] with her . Patient sustained a shoulder injury s/p fall early this morning and  Ramírez called EMS. Patient had a blood alcohol level of 241. Patient was soiled of urine and feces upon arrival. Patient does not recall how much wine she drank but thinks it was 2-3 glasses. Patient also indicated she drank more in the middle of the night to help her sleep. Patient states she has been drinking a few glasses of wine nightly for years. She denies symptoms of withdrawal, past and present. Patient denies depression and anxiety and suicidal thoughts. She cannot identify how many years she has been drinking. Patient states she hasn't gone to treatment. Patient is ok with this writer calling her spouse and notes he does not drive, nor does he have a car. Patient states she and  have to vacate their current residence to an apartment in [place].  Called spouse who answered phone. He verbalized concerns regarding spouse's drinking, particularly in relation to her medical problems. Spouse confirmed the couple is about to move.  This writer provided spouse  with a referral to the [facility] on [address] in [place]  [phone]. Mr. Rodríguez wants his wife to get the help and is upset about her chronic problem. He stated he will speak to his wife about the referral.    ICD10 Code:  F10.22    ALCOHOL DEPENDENCE WITH INTOXICATION    RECOMMENDATION:  Outpatient treatment referral provided to spouse as indicated in this note.

## 2019-08-12 NOTE — ED PROVIDER NOTE - NSFOLLOWUPINSTRUCTIONS_ED_ALL_ED_FT
Alcohol Abuse    Alcohol intoxication occurs when the amount of alcohol that a person has consumed impairs his or her ability to mentally and physically function. Chronic alcohol consumption can also lead to a variety of health issues including neurological disease, stomach disease, heart disease, liver disease, etc. Do not drive after drinking alcohol. Drinking enough alcohol to end up in an Emergency Room suggests you may have an alcohol abuse problem. Seek help at a drug addiction center.    SEEK IMMEDIATE MEDICAL CARE IF YOU HAVE ANY OF THE FOLLOWING SYMPTOMS: seizures, vomiting blood, blood in your stool, lightheadedness/dizziness, or becoming shaky to tremulous when you stop drinking.     Dislocation    A dislocation is a displacement of the bones that form a joint. This can result from trauma or due to a previous weakening of that joint. Symptoms include pain, swelling, and deformity at the site. Your health care provider has performed maneuvers to place the bones back in place. If a splint or brace was applied, make sure to wear it until you see an orthopedist as instructed.     SEEK IMMEDIATE MEDICAL CARE IF YOU HAVE ANY OF THE FOLLOWING SYMPTOMS: numbness, tingling, pain, weakness, or skin color/temperature change in any part of your body distal to the injury.

## 2019-08-12 NOTE — ED PROVIDER NOTE - CLINICAL SUMMARY MEDICAL DECISION MAKING FREE TEXT BOX
Patient presents to the ED s/p fall. Her  called EMS. She is complaining of shoulder injury and pain. She cannot explain how she fell. She has alcohol on her breath and is very unkempt. Poor historian.  Per records, ETOH use, CAD/CABGx4, AAA repair, HTN, HLD, T2DM.   Pt cannot ascertain if she hit her head or had LOC. She said she tripped. She was not pushed. She has no fear for her safety at home. Lives with .  Left shoulder feels out of socket. No sensory or vascular compromise. Plan to put back in place without sedation. Pt will do well. CT brain and xray elbow . Post reduction xray shoulder. Dr Akhtar ordered the xray shoulder prior to my evaluation.   Will d/w SW for safe discharge as I was unable to reach  via phone.

## 2019-08-12 NOTE — ED PROVIDER NOTE - OBJECTIVE STATEMENT
Patient presents to the ED s/p fall. Her  called EMS. She is complaining of shoulder injury and pain. She cannot explain how she fell. She has alcohol on her breath and is very unkempt. Poor historian.  Per records, ETOH use, CAD/CABGx4, AAA repair, HTN, HLD, T2DM.   Pt cannot ascertain if she hit her head or had LOC. She said she tripped. She was not pushed. She has no fear for her safety at home. Lives with .

## 2019-08-12 NOTE — ED PROVIDER NOTE - CARE PLAN
Principal Discharge DX:	Shoulder dislocation, left, initial encounter  Secondary Diagnosis:	Alcoholic intoxication with complication

## 2019-08-12 NOTE — ED PROVIDER NOTE - CARE PROVIDER_API CALL
Gregory Leigh)  Orthopaedic Sports Medicine; Orthopaedic Surgery  [address] [address]  Phone: [phone]  Fax: [phone]  Follow Up Time:

## 2019-08-22 NOTE — INPATIENT CERTIFICATION FOR MEDICARE PATIENTS - IN ORDER TO MEET MEDICARE REQUIREMENTS.
In order to meet Medicare requirements, the clinical documentation must support the information cited in the admission order.  Please be sure to provide detailed and clear documentation about the following in the admitting note/history and physical:
none

## 2019-11-19 NOTE — ED PROVIDER NOTE - QRS
Per last office visit notes 7/15/19  Next 5 appointments (look out 90 days)    Dec 09, 2019 10:30 AM CST  [facility] [address] [facility] [address]  [phone]   Jan 06, 2020  9:45 AM CST  Return Visit with Ervin Honeycutt MD  [facility] ([facility]) [address] [facility] [address]  [phone]          Prescription approved per [facility] Refill Protocol.  Need to keep upcoming appointment for further refills  Dianne Sawant RN      132

## 2020-12-23 NOTE — ED PROVIDER NOTE - CADM POA URETHRAL CATHETER
Medicare Wellness Visit patient outreach outcome:  Attempted outreach and message left     Elizabeth Rajendra  [facility] Health Asst  [phone]  
No

## 2022-10-26 NOTE — PATIENT PROFILE ADULT. - MEDICATION HERBAL REMEDIES, PROFILE
Detail Level: Detailed no Was A Bandage Applied: Yes Punch Size In Mm: 3 Biopsy Type: H and E Anesthesia Type: 1% lidocaine with epinephrine Anesthesia Volume In Cc (Will Not Render If 0): 0.5 Additional Anesthesia Volume In Cc (Will Not Render If 0): 0 Hemostasis: None Epidermal Sutures: none, closed by secondary intention Wound Care: No ointment Dressing: bandage Patient Will Remove Sutures At Home?: No Lab: -101 Consent: Verbal consent was obtained and risks were reviewed including but not limited to scarring, infection, bleeding, scabbing, incomplete removal, nerve damage and allergy to anesthesia. Post-Care Instructions: I reviewed with the patient in detail post-care instructions. Patient is to keep the biopsy site dry overnight, and then apply aquaphor twice daily until healed. Home Suture Removal Text: Patient was provided a home suture removal kit and will remove their sutures at home.  If they have any questions or difficulties they will call the office. Notification Instructions: Patient will be notified of biopsy results. However, patient instructed to call the office if not contacted within 2 weeks. Billing Type: Third-Party Bill Information: Selecting Yes will display possible errors in your note based on the variables you have selected. This validation is only offered as a suggestion for you. PLEASE NOTE THAT THE VALIDATION TEXT WILL BE REMOVED WHEN YOU FINALIZE YOUR NOTE. IF YOU WANT TO FAX A PRELIMINARY NOTE YOU WILL NEED TO TOGGLE THIS TO 'NO' IF YOU DO NOT WANT IT IN YOUR FAXED NOTE.

## 2022-12-23 NOTE — ED ADULT NURSE NOTE - CAS EDP DISCH DISPOSITION ADMI
[facility] Nsaids Pregnancy And Lactation Text: These medications are considered safe up to 30 weeks gestation. It is excreted in breast milk.

## 2023-01-04 NOTE — PHYSICAL THERAPY INITIAL EVALUATION ADULT - PRECAUTIONS/LIMITATIONS, REHAB EVAL
no known precautions/limitations
bilateral upper extremity ROM was WFL (within functional limits)/bilateral lower extremity ROM was WFL (within functional limits)

## 2023-10-26 NOTE — ED ADULT TRIAGE NOTE - BP NONINVASIVE DIASTOLIC (MM HG)
78
Independent in  transfer ability bed to chair and vice versa using appropriate assistive device  and prevent falls.

## 2024-02-16 NOTE — OCCUPATIONAL THERAPY INITIAL EVALUATION ADULT - ASSISTIVE DEVICE FOR TRANSFER: STAND/SIT, REHAB EVAL
Continue cefepime and vancomycin  MRSA screen negative  Vascular surgery following status post washout with VAC placement on 2/14/2024  Infectious disease following  Follow or cultures   rolling walker

## 2024-04-26 NOTE — PROGRESS NOTE ADULT - PROVIDER SPECIALTY LIST ADULT
Critical Care
Hospitalist
Pulmonology
No

## 2024-09-03 NOTE — ED PROVIDER NOTE - CPE EDP HEME LYMPH NORM
"Chief Complaint   Patient presents with    Physical       Initial /86   Pulse 86   Temp 98.9  F (37.2  C) (Tympanic)   Resp 20   Ht 1.645 m (5' 4.76\")   Wt 114.8 kg (253 lb)   LMP 02/12/2024 (Approximate)   SpO2 98%   BMI 42.41 kg/m   Estimated body mass index is 42.41 kg/m  as calculated from the following:    Height as of this encounter: 1.645 m (5' 4.76\").    Weight as of this encounter: 114.8 kg (253 lb).    Patient presents to the clinic using No DME    Is there anyone who you would like to be able to receive your results? No  If yes have patient fill out CARL[facility]      "
normal...